# Patient Record
Sex: MALE | Race: WHITE | Employment: OTHER | URBAN - METROPOLITAN AREA
[De-identification: names, ages, dates, MRNs, and addresses within clinical notes are randomized per-mention and may not be internally consistent; named-entity substitution may affect disease eponyms.]

---

## 2017-08-02 ENCOUNTER — HOSPITAL ENCOUNTER (OUTPATIENT)
Dept: SURGERY | Age: 62
Discharge: HOME | End: 2017-08-02
Payer: COMMERCIAL

## 2017-08-02 VITALS
DIASTOLIC BLOOD PRESSURE: 86 MMHG | BODY MASS INDEX: 30.48 KG/M2 | RESPIRATION RATE: 16 BRPM | HEART RATE: 75 BPM | SYSTOLIC BLOOD PRESSURE: 143 MMHG | WEIGHT: 230 LBS | OXYGEN SATURATION: 95 % | HEIGHT: 73 IN | TEMPERATURE: 98.3 F

## 2017-08-02 LAB
ANION GAP BLD CALC-SCNC: 6 MMOL/L (ref 7–16)
APPEARANCE UR: CLEAR
BACTERIA SPEC CULT: NORMAL
BASOPHILS # BLD AUTO: 0 K/UL (ref 0–0.2)
BASOPHILS # BLD: 0 % (ref 0–2)
BILIRUB UR QL: NEGATIVE
BUN SERPL-MCNC: 20 MG/DL (ref 6–23)
CALCIUM SERPL-MCNC: 8.8 MG/DL (ref 8.3–10.4)
CHLORIDE SERPL-SCNC: 104 MMOL/L (ref 98–107)
CO2 SERPL-SCNC: 30 MMOL/L (ref 21–32)
COLOR UR: YELLOW
CREAT SERPL-MCNC: 0.81 MG/DL (ref 0.8–1.5)
DIFFERENTIAL METHOD BLD: ABNORMAL
EOSINOPHIL # BLD: 0 K/UL (ref 0–0.8)
EOSINOPHIL NFR BLD: 0 % (ref 0.5–7.8)
ERYTHROCYTE [DISTWIDTH] IN BLOOD BY AUTOMATED COUNT: 15.2 % (ref 11.9–14.6)
GLUCOSE SERPL-MCNC: 118 MG/DL (ref 65–100)
GLUCOSE UR STRIP.AUTO-MCNC: NEGATIVE MG/DL
HCT VFR BLD AUTO: 46.6 % (ref 41.1–50.3)
HGB BLD-MCNC: 15.9 G/DL (ref 13.6–17.2)
HGB UR QL STRIP: NEGATIVE
IMM GRANULOCYTES # BLD: 0.1 K/UL (ref 0–0.5)
IMM GRANULOCYTES NFR BLD AUTO: 0.6 % (ref 0–5)
INR PPP: 3 (ref 0.9–1.2)
KETONES UR QL STRIP.AUTO: NEGATIVE MG/DL
LEUKOCYTE ESTERASE UR QL STRIP.AUTO: NEGATIVE
LYMPHOCYTES # BLD AUTO: 9 % (ref 13–44)
LYMPHOCYTES # BLD: 0.7 K/UL (ref 0.5–4.6)
MCH RBC QN AUTO: 34.6 PG (ref 26.1–32.9)
MCHC RBC AUTO-ENTMCNC: 34.1 G/DL (ref 31.4–35)
MCV RBC AUTO: 101.3 FL (ref 79.6–97.8)
MONOCYTES # BLD: 0.3 K/UL (ref 0.1–1.3)
MONOCYTES NFR BLD AUTO: 4 % (ref 4–12)
NEUTS SEG # BLD: 7.1 K/UL (ref 1.7–8.2)
NEUTS SEG NFR BLD AUTO: 86 % (ref 43–78)
NITRITE UR QL STRIP.AUTO: NEGATIVE
PH UR STRIP: 6.5 [PH] (ref 5–9)
PLATELET # BLD AUTO: 147 K/UL (ref 150–450)
PMV BLD AUTO: 9.8 FL (ref 10.8–14.1)
POTASSIUM SERPL-SCNC: 4.9 MMOL/L (ref 3.5–5.1)
PROT UR STRIP-MCNC: NEGATIVE MG/DL
PROTHROMBIN TIME: 33.2 SEC (ref 9.6–12)
RBC # BLD AUTO: 4.6 M/UL (ref 4.23–5.67)
SERVICE CMNT-IMP: NORMAL
SODIUM SERPL-SCNC: 140 MMOL/L (ref 136–145)
SP GR UR REFRACTOMETRY: 1.02 (ref 1–1.02)
UROBILINOGEN UR QL STRIP.AUTO: 1 EU/DL (ref 0.2–1)
WBC # BLD AUTO: 8.1 K/UL (ref 4.3–11.1)

## 2017-08-02 PROCEDURE — 85025 COMPLETE CBC W/AUTO DIFF WBC: CPT | Performed by: ORTHOPAEDIC SURGERY

## 2017-08-02 PROCEDURE — 77030027138 HC INCENT SPIROMETER -A

## 2017-08-02 PROCEDURE — 87641 MR-STAPH DNA AMP PROBE: CPT | Performed by: ORTHOPAEDIC SURGERY

## 2017-08-02 PROCEDURE — 81003 URINALYSIS AUTO W/O SCOPE: CPT | Performed by: ORTHOPAEDIC SURGERY

## 2017-08-02 PROCEDURE — 80048 BASIC METABOLIC PNL TOTAL CA: CPT | Performed by: ORTHOPAEDIC SURGERY

## 2017-08-02 PROCEDURE — 85610 PROTHROMBIN TIME: CPT | Performed by: ORTHOPAEDIC SURGERY

## 2017-08-02 RX ORDER — ENOXAPARIN SODIUM 100 MG/ML
40 INJECTION SUBCUTANEOUS DAILY
COMMUNITY
End: 2017-08-30

## 2017-08-02 RX ORDER — PREDNISONE 20 MG/1
1 TABLET ORAL DAILY
COMMUNITY
Start: 2017-07-27 | End: 2017-08-30

## 2017-08-02 RX ORDER — WARFARIN 10 MG/1
1 TABLET ORAL DAILY
COMMUNITY
Start: 2016-03-24 | End: 2020-02-26 | Stop reason: CLARIF

## 2017-08-02 RX ORDER — HYDROCODONE BITARTRATE AND ACETAMINOPHEN 7.5; 325 MG/1; MG/1
1 TABLET ORAL
Refills: 0 | COMMUNITY
Start: 2017-07-12 | End: 2017-08-12

## 2017-08-02 NOTE — PERIOP NOTES
Patient verified name, , and surgery as listed in [facility]. TYPE  CASE:3              Orders:  received  Labs per Spine protocol:  CBC with dif, BMP, Pt/inr, and MRSA/MSSA nasal swab   Labs per anesthesia protocol: type and screen and pt/inr DOS- signed and held for preop  EKG/cardiac records  :  Not required  Glucose: Not required    Instructed patient to continue previous medications as prescribed prior to surgery and  to take the following medications the day of surgery according to anesthesia guidelines with a small sip of water : Bystolic, prednisone and Norco       Continue all previous medications unless otherwise directed. Instructed patient to hold  the following medications prior to surgery: Coumadin 5 days prior to surgery per Dr Tereza Gaffney and Lovenox Bridge per Dr Tereza Gaffney instructions    Pt viewed Spine Pre-hab video. All further questions were addressed. Pt was provided with antibacterial soap, Hibiclens, long-handled sponge, Spine Recovery booklet and incentive spirometer. Pt correctly demonstrated use of incentive spirometer and instruction to bring it to the hospital on day of surgery. Handouts and all Surgery instructions provided to pt and pt verbalizes understanding. Patient instructed on the following and verbalized understanding:  Arrive at MAIN entrance, time of arrival to be called the day before by 1700. Responsible adult must drive patient to and from hospital, stay during surgery and 24 hours postoperatively. Npo after midnight including gum, mints and ice chips. Shower using non-moisturizing soap and hibiclens the night before and the morning of surgery. Leave all valuables at home. Instructed on no jewelry or body piercings on the dos. Bring insurance card and ID. No perfumes, oil, powder, colognes, makeup or  lotions on the skin. Patient verbalized understanding of all instructions and provided all medical/health information to the best of their ability.

## 2017-08-02 NOTE — PERIOP NOTES
Recent Results (from the past 12 hour(s))   MSSA/MRSA SC BY PCR, NASAL SWAB    Collection Time: 08/02/17  9:34 AM   Result Value Ref Range    Special Requests: NO SPECIAL REQUESTS      Culture result:        SA target not detected. A MRSA NEGATIVE, SA NEGATIVE test result does not preclude MRSA or SA nasal colonization. CBC WITH AUTOMATED DIFF    Collection Time: 08/02/17  9:44 AM   Result Value Ref Range    WBC 8.1 4.3 - 11.1 K/uL    RBC 4.60 4.23 - 5.67 M/uL    HGB 15.9 13.6 - 17.2 g/dL    HCT 46.6 41.1 - 50.3 %    .3 (H) 79.6 - 97.8 FL    MCH 34.6 (H) 26.1 - 32.9 PG    MCHC 34.1 31.4 - 35.0 g/dL    RDW 15.2 (H) 11.9 - 14.6 %    PLATELET 743 (L) 993 - 450 K/uL    MPV 9.8 (L) 10.8 - 14.1 FL    DF AUTOMATED      NEUTROPHILS 86 (H) 43 - 78 %    LYMPHOCYTES 9 (L) 13 - 44 %    MONOCYTES 4 4.0 - 12.0 %    EOSINOPHILS 0 (L) 0.5 - 7.8 %    BASOPHILS 0 0.0 - 2.0 %    IMMATURE GRANULOCYTES 0.6 0.0 - 5.0 %    ABS. NEUTROPHILS 7.1 1.7 - 8.2 K/UL    ABS. LYMPHOCYTES 0.7 0.5 - 4.6 K/UL    ABS. MONOCYTES 0.3 0.1 - 1.3 K/UL    ABS. EOSINOPHILS 0.0 0.0 - 0.8 K/UL    ABS. BASOPHILS 0.0 0.0 - 0.2 K/UL    ABS. IMM.  GRANS. 0.1 0.0 - 0.5 K/UL   METABOLIC PANEL, BASIC    Collection Time: 08/02/17  9:44 AM   Result Value Ref Range    Sodium 140 136 - 145 mmol/L    Potassium 4.9 3.5 - 5.1 mmol/L    Chloride 104 98 - 107 mmol/L    CO2 30 21 - 32 mmol/L    Anion gap 6 (L) 7 - 16 mmol/L    Glucose 118 (H) 65 - 100 mg/dL    BUN 20 6 - 23 MG/DL    Creatinine 0.81 0.8 - 1.5 MG/DL    GFR est AA >60 >60 ml/min/1.73m2    GFR est non-AA >60 >60 ml/min/1.73m2    Calcium 8.8 8.3 - 10.4 MG/DL   PROTHROMBIN TIME + INR    Collection Time: 08/02/17  9:44 AM   Result Value Ref Range    Prothrombin time 33.2 (H) 9.6 - 12.0 sec    INR 3.0 (H) 0.9 - 1.2     URINALYSIS W/ RFLX MICROSCOPIC    Collection Time: 08/02/17 10:20 AM   Result Value Ref Range    Color YELLOW      Appearance CLEAR      Specific gravity 1.023 1.001 - 1.023      pH (UA) 6.5 5.0 - 9.0      Protein NEGATIVE  NEG mg/dL    Glucose NEGATIVE  mg/dL    Ketone NEGATIVE  NEG mg/dL    Bilirubin NEGATIVE  NEG      Blood NEGATIVE  NEG      Urobilinogen 1.0 0.2 - 1.0 EU/dL    Nitrites NEGATIVE  NEG      Leukocyte Esterase NEGATIVE  NEG

## 2017-08-07 ENCOUNTER — ANESTHESIA EVENT (OUTPATIENT)
Dept: SURGERY | Age: 62
DRG: 460 | End: 2017-08-07
Payer: COMMERCIAL

## 2017-08-07 NOTE — H&P
Chief complaint: Back and bilateral leg pain, Bilateral acute foot drop. History of present illness: This is a very pleasant 63year old patient who presents with a 2 week history of acute onset of bilateral leg pain and foot drop after a fall last week. He was on vacation and suffered to back-to-back falls. He then noticed that he had difficulty walking after these falls. He had tingling that started in his leg and now he cannot lift either foot because of acute foot drop. He was referred for further evaluation of his lumbar spine by Dr. Karo Shafer. MRI of the lumbar spine reveals severe spinal stenosis at L3-4 with lateral recess stenosis as well. At L4-5 there is a grade 1 anterolisthesis severe facet arthropathy and mild central and some lateral recess stenosis primarily on the left. There is degenerative changes and moderate left L5 lateral recess and foraminal stenosis at L5-S1. Patient is in a wheelchair today. He has difficulty walking any distance. He has pain in the back and radiates down each leg. He rates his pain a 10/10. He denies any change in bowel or bladder function. Diclofenac, Norco.  Patient takes daily Coumadin due to a history of 3 DVTs and a pulmonary embolism. He underwent a lobectomy due to aspirate was infection and his previous pulmonary embolism. He has come off of his Coumadin the past for surgery but has to be on a Lovenox bridge. His Coumadin as prescribed by Dr. Zacarias Meredith, his primary care physician. He is currently completing a prednisone pack. PMHx/PSHx/Social History/Medications/Allergies/ROS are listed and have been reviewed. Pertinent positives: History of DVTs and pulmonary emboli with daily anticoagulation therapy, history of a right lower lobectomy, hypertension, right total hip arthroplasty, right rotator cuff repair. He is a single nonsmoker who endorses minimal alcohol use and works full-time in building maintenance. Review of systems was noted. Pertinent positives and negatives were discussed with the patient particularly those that related to musculoskeletal complaints. Nonorthopedic complaints were directed to the primary care physician. Medications:  Diastolic, Coumadin  Norco (7.5-325 MG, Take 1 tablet po q 4-6 prn pain)    Allergies:  No known drug allergies  ?????    Physical Exam:   GENERAL: Well developed, well-nourished adult in no acute distress. Patient is appropriately conversant  EYES:  Pink conjunctivae, Sclera clear. No ptosis. ENT:  Nose and ears appear normal.  Mucous membranes moist   NECK:  Non-tender. No masses. Full range of motion. RESPIRATORY:  Normal respiratory effort. Lungs are clear to auscultation except for decreased breath sounds in the right lower lobe  CARDIOVASCULAR:  Pulse regular. No peripheral edema. The feet are warm with good capillary refill and palpable pedal pulses. No murmur. S1-S2 audible. GI:  Abdomen soft, non-tender, non-distended. Normal bowel sounds   SKIN:  No visible rashes, ulcers or lesions. Normal turgor and temperature   EXTREMITIES:  Warm and well perfused. No cyanosis or clubbing. MSK:   Examination of the lumbar spine reveals a relative hypolordosis, and no evidence of significant saggital plane deformity. There is exacerbation of symptoms with lumbar extension. There is not significant tenderness to palpation along the spinous processes or paraspinal musculature. The patient ambulates with a slightly crouched posture. There is minimal hip irritability with internal or external rotation bilaterally. NEURO:  Straight leg testing is negative. Sensory testing reveals intact sensation to light touch and pin prick in the distribution of the L3-S1 dermatomes bilaterally, though there is subjective tingling over the bilateral L4. Taty Brooks   Reflexes   Right Left   Quadriceps (L4) 1 1   Achilles (S1) 2 2     Ankle jerk is negative for clonus    Strength testing in the lower extremity reveals the following based on the 5 point grading scale:     HF (L2) H Ab (L5) KE (L3/4) ADF (L4) EHL (L5) A Ev (S1) APF (S1)   Right 5 5 5 0 0 5 5   Left 5 5 5 0 0 5 5       PSYCH:  Alert and oriented X 3. Appropriate affect. Intact judgment and insight. Data Reviewed:    Radiographic Studies:    X-rays including AP and lateral views of the lumbar spine were reviewed: ? ???? There is noted to be a Grade 1 degenerative spondylolisthesis at L4 and L5. Bone quality appears normal.    Radiographic impression: Lumbar spondylolisthesis    MRI Lumbar spine, report and images independently reviewed from [facility]:  L3-4 there is moderate facet arthropathy severe ligamentum flavum hypertrophy and degenerative disc disease causing severe spinal stenosis. There is also severe lateral recess stenosis. Right greater than left L3 foraminal stenosis. L4-5 there is a grade 1 anterolisthesis. There is disc bulging and severe facet arthropathy ligamentum flavum hypertrophy causing mild-to-moderate stenosis. There is mild right lateral recess narrowing. At L5-S1 there is severe degenerative disc disease with left-sided disc bulge and lateral left osteophytes. There is moderate to severe left foraminal L5 stenosis and mild right. There is severe facet arthropathy. There is also mention of a cyst on the left kidney. We will have his this reviewed by his primary care provider. Assessment/Plan: This patients clinical history and physical exam is consistent with neurogenic claudication which is likely due to lumbar stenosis. Patient has acute onset of bilateral foot drop secondary to his severe spinal stenosis after a fall. He also has a spondylolisthesis of L4 and L5 with multilevel degenerative disease and is spondylosis. There is some stenosis at L4-5 and lateral recess stenosis.     I discussed the natural history of lumbar stenosis in that it is a degenerative condition that is usually slowly progressive resulting in gradual loss of mobility. I reassured the patient that this is not a condition that typically predisposes him to an acute paraplegia; however, the more neurologic deficits he acquires and the longer they go untreated, the less likely he is to have neurological improvements after an operation. He understands that conservative treatments typically include physical therapy, oral and/or epidural steroids, pain management, and simple observation. And, that these treatments do not address the anatomical pinching of the spinal nerves, but rather help patients cope with the resulting symptoms. I also discussed potential surgical if the symptoms fail to improve or there is a progressive neurologic deficit or conservative management has been exhausted. ????    I reviewed patients case with Dr. Bri Ortiz. Because of patients acute foot drop secondary to severe spinal stenosis, surgery is indicated. We discussed the details of surgery including a midline incision in over the low back followed by dissection to the area of stenosis. The nerves would be freed up by trimming any impinging structures including ligaments and bone. Then any segments that are deemed to be unstable will be fused together with either bone graft or bone aspirate/synthetic, and typically screws and rods will supplement the fusion. A drain would be inserted and the wound would be closed with suture and covered with sterile dressings. The patient would expect to stay in the hospital 2-4 days or until he can get about safely with minimal assistance. A short stay in a rehabilitation facility could also be considered depending on how quickly he recovers. Follow-up would be scheduled for 2-3 weeks and he would have restrictions including no driving, and no lifting greater than 15 lbs until follow up with me.  He was encouraged to walk as much as possible before and after the operation to facilitate an expeditious recovery. We also discussed the potential risks of the surgery including, but not limited to infection, spinal fluid leak and potential headaches requiring him to remain supine or have a lumbar drain inserted post-operatively; injury to the cauda equina or peripheral nerve root resulting in paralysis, bowel or bladder dysfunction, or loss of use of an extremity; persistent back or leg symptoms or pain at the bone aspirate/graft site; recurrence of stenosis or the development of instability, or failure of the hardware or fusion to heal possibly needing additional surgery;  blood loss requiring transfusion; and the risks of anesthesia including, but not limited heart attack, stroke, and blood clot. The patient voiced an understanding of these issues and would like to discuss them over with her family and will get back with me with his desired treatment course. The procedure that may prove to be beneficial here is a L3-L5 laminectomy and fusion with bone marrow aspirate/synthetic and instrumentation, possible TLIF. The patient was prescribed an oral pain medication. The patient understands that this is a temporary measure to bring acute or post-surgical pain under control and that it out of the scope of this practice to continue opiates on a long-term basis. We will go ahead and contact patients primary care provider Devante Jiang to obtain permission for patient to stop his Coumadin 7 days prior to surgery and we will bridge him on Lovenox. We will obtain the correct Lovenox dosage from his primary care physician. ????? ?????         Electronically Signed By Alfredo LIM on 7/28/2017

## 2017-08-08 ENCOUNTER — APPOINTMENT (OUTPATIENT)
Dept: GENERAL RADIOLOGY | Age: 62
DRG: 460 | End: 2017-08-08
Attending: ORTHOPAEDIC SURGERY
Payer: COMMERCIAL

## 2017-08-08 ENCOUNTER — ANESTHESIA (OUTPATIENT)
Dept: SURGERY | Age: 62
DRG: 460 | End: 2017-08-08
Payer: COMMERCIAL

## 2017-08-08 ENCOUNTER — HOSPITAL ENCOUNTER (INPATIENT)
Age: 62
LOS: 4 days | Discharge: NURSING, REHAB OR LONG-TERM CARE | DRG: 460 | End: 2017-08-12
Attending: ORTHOPAEDIC SURGERY | Admitting: ORTHOPAEDIC SURGERY
Payer: COMMERCIAL

## 2017-08-08 DIAGNOSIS — M48.061 SPINAL STENOSIS OF LUMBAR REGION: ICD-10-CM

## 2017-08-08 PROBLEM — M48.00 SPINAL STENOSIS: Status: ACTIVE | Noted: 2017-08-08

## 2017-08-08 PROBLEM — M43.16 SPONDYLOLISTHESIS OF LUMBAR REGION: Status: ACTIVE | Noted: 2017-08-08

## 2017-08-08 LAB
ABO + RH BLD: NORMAL
BLOOD GROUP ANTIBODIES SERPL: NORMAL
INR BLD: NORMAL
INR PPP: 1 (ref 0.9–1.2)
PROTHROMBIN TIME: 10.6 SEC (ref 9.6–12)
SPECIMEN EXP DATE BLD: NORMAL

## 2017-08-08 PROCEDURE — 77030025623 HC BUR RND PRECIS STRY -D: Performed by: ORTHOPAEDIC SURGERY

## 2017-08-08 PROCEDURE — 76010000174 HC OR TIME 3.5 TO 4 HR INTENSV-TIER 1: Performed by: ORTHOPAEDIC SURGERY

## 2017-08-08 PROCEDURE — 85610 PROTHROMBIN TIME: CPT | Performed by: ORTHOPAEDIC SURGERY

## 2017-08-08 PROCEDURE — 0SG10AJ FUSION OF 2 OR MORE LUMBAR VERTEBRAL JOINTS WITH INTERBODY FUSION DEVICE, POSTERIOR APPROACH, ANTERIOR COLUMN, OPEN APPROACH: ICD-10-PCS | Performed by: ORTHOPAEDIC SURGERY

## 2017-08-08 PROCEDURE — 77030008703 HC TU ET UNCUF COVD -A: Performed by: ANESTHESIOLOGY

## 2017-08-08 PROCEDURE — 77030014647 HC SEAL FBRN TISSL BAXT -D: Performed by: ORTHOPAEDIC SURGERY

## 2017-08-08 PROCEDURE — 77030014368: Performed by: ORTHOPAEDIC SURGERY

## 2017-08-08 PROCEDURE — 77030014007 HC SPNG HEMSTAT J&J -B: Performed by: ORTHOPAEDIC SURGERY

## 2017-08-08 PROCEDURE — 74011250636 HC RX REV CODE- 250/636

## 2017-08-08 PROCEDURE — 77030037155 HC BLCKR SPN CAP LOK AXI STRY -B: Performed by: ORTHOPAEDIC SURGERY

## 2017-08-08 PROCEDURE — 77030011640 HC PAD GRND REM COVD -A: Performed by: ORTHOPAEDIC SURGERY

## 2017-08-08 PROCEDURE — 74011250637 HC RX REV CODE- 250/637: Performed by: ANESTHESIOLOGY

## 2017-08-08 PROCEDURE — 74011250636 HC RX REV CODE- 250/636: Performed by: ANESTHESIOLOGY

## 2017-08-08 PROCEDURE — 77030037710: Performed by: ORTHOPAEDIC SURGERY

## 2017-08-08 PROCEDURE — 77030019908 HC STETH ESOPH SIMS -A: Performed by: ANESTHESIOLOGY

## 2017-08-08 PROCEDURE — 77030032490 HC SLV COMPR SCD KNE COVD -B: Performed by: ORTHOPAEDIC SURGERY

## 2017-08-08 PROCEDURE — 0SG1071 FUSION OF 2 OR MORE LUMBAR VERTEBRAL JOINTS WITH AUTOLOGOUS TISSUE SUBSTITUTE, POSTERIOR APPROACH, POSTERIOR COLUMN, OPEN APPROACH: ICD-10-PCS | Performed by: ORTHOPAEDIC SURGERY

## 2017-08-08 PROCEDURE — 77030008477 HC STYL SATN SLP COVD -A: Performed by: ANESTHESIOLOGY

## 2017-08-08 PROCEDURE — 77030030163 HC BN WAX J&J -A: Performed by: ORTHOPAEDIC SURGERY

## 2017-08-08 PROCEDURE — 74011250636 HC RX REV CODE- 250/636: Performed by: ORTHOPAEDIC SURGERY

## 2017-08-08 PROCEDURE — 77030018836 HC SOL IRR NACL ICUM -A: Performed by: ORTHOPAEDIC SURGERY

## 2017-08-08 PROCEDURE — 74011000250 HC RX REV CODE- 250

## 2017-08-08 PROCEDURE — 77030031139 HC SUT VCRL2 J&J -A: Performed by: ORTHOPAEDIC SURGERY

## 2017-08-08 PROCEDURE — 77030000001 HC SPCR SPN PEEK STRY -I2: Performed by: ORTHOPAEDIC SURGERY

## 2017-08-08 PROCEDURE — 77030034760 HC NDL BN MAR ASPIR JAMSH STRY -B: Performed by: ORTHOPAEDIC SURGERY

## 2017-08-08 PROCEDURE — 77030020782 HC GWN BAIR PAWS FLX 3M -B: Performed by: ANESTHESIOLOGY

## 2017-08-08 PROCEDURE — 76210000003 HC OR PH I REC 3.5 TO 4 HR: Performed by: ORTHOPAEDIC SURGERY

## 2017-08-08 PROCEDURE — 72100 X-RAY EXAM L-S SPINE 2/3 VWS: CPT

## 2017-08-08 PROCEDURE — 07DR3ZZ EXTRACTION OF ILIAC BONE MARROW, PERCUTANEOUS APPROACH: ICD-10-PCS | Performed by: ORTHOPAEDIC SURGERY

## 2017-08-08 PROCEDURE — C1713 ANCHOR/SCREW BN/BN,TIS/BN: HCPCS | Performed by: ORTHOPAEDIC SURGERY

## 2017-08-08 PROCEDURE — 76060000039 HC ANESTHESIA 4 TO 4.5 HR: Performed by: ORTHOPAEDIC SURGERY

## 2017-08-08 PROCEDURE — 77030037158 HC BIT DRL SPN XIA DISP STRY -C: Performed by: ORTHOPAEDIC SURGERY

## 2017-08-08 PROCEDURE — 36415 COLL VENOUS BLD VENIPUNCTURE: CPT | Performed by: ORTHOPAEDIC SURGERY

## 2017-08-08 PROCEDURE — 65270000029 HC RM PRIVATE

## 2017-08-08 PROCEDURE — 77030037143 HC ROD SPN HEX XIA TI STRY -D: Performed by: ORTHOPAEDIC SURGERY

## 2017-08-08 PROCEDURE — 74011250637 HC RX REV CODE- 250/637: Performed by: ORTHOPAEDIC SURGERY

## 2017-08-08 PROCEDURE — 99218 HC RM OBSERVATION: CPT

## 2017-08-08 PROCEDURE — 77030037145 HC XCONN SPN POLYX XIA STRY -G: Performed by: ORTHOPAEDIC SURGERY

## 2017-08-08 PROCEDURE — 0SB20ZZ EXCISION OF LUMBAR VERTEBRAL DISC, OPEN APPROACH: ICD-10-PCS | Performed by: ORTHOPAEDIC SURGERY

## 2017-08-08 PROCEDURE — 74011000272 HC RX REV CODE- 272: Performed by: ORTHOPAEDIC SURGERY

## 2017-08-08 PROCEDURE — 77030012894: Performed by: ORTHOPAEDIC SURGERY

## 2017-08-08 PROCEDURE — 86900 BLOOD TYPING SEROLOGIC ABO: CPT | Performed by: ANESTHESIOLOGY

## 2017-08-08 DEVICE — POLYAXIAL CORTICAL SCREW
Type: IMPLANTABLE DEVICE | Site: SPINE LUMBAR | Status: FUNCTIONAL
Brand: XIA 4.5 SYSTEM -  XIA CT

## 2017-08-08 DEVICE — BIO DBM PLUS PUTTY (WITH CANCELLOUS)
Type: IMPLANTABLE DEVICE | Site: SPINE LUMBAR | Status: FUNCTIONAL
Brand: BIO DBM

## 2017-08-08 DEVICE — BLOCKER
Type: IMPLANTABLE DEVICE | Site: SPINE LUMBAR | Status: FUNCTIONAL
Brand: XIA 4.5 SYSTEM -  XIA CT

## 2017-08-08 DEVICE — 30-36 MM POLYAXIAL CROSS CONNECTOR
Type: IMPLANTABLE DEVICE | Site: SPINE LUMBAR | Status: FUNCTIONAL
Brand: XIA 4 5

## 2017-08-08 DEVICE — GRAFT BNE SUB 20CC 2 4MM GRAN GROWTH FACT ALLGRFT OSTEOAMP: Type: IMPLANTABLE DEVICE | Site: SPINE LUMBAR | Status: FUNCTIONAL

## 2017-08-08 DEVICE — VITALLIUM PREBENT AND PRECUT ROD WITH HEX
Type: IMPLANTABLE DEVICE | Site: SPINE LUMBAR | Status: FUNCTIONAL
Brand: XIA 4 5

## 2017-08-08 DEVICE — VERTEBRAL SPACER
Type: IMPLANTABLE DEVICE | Site: SPINE LUMBAR | Status: FUNCTIONAL
Brand: AVS TL

## 2017-08-08 RX ORDER — HYDROMORPHONE HYDROCHLORIDE 1 MG/ML
1 INJECTION, SOLUTION INTRAMUSCULAR; INTRAVENOUS; SUBCUTANEOUS
Status: DISCONTINUED | OUTPATIENT
Start: 2017-08-08 | End: 2017-08-12 | Stop reason: HOSPADM

## 2017-08-08 RX ORDER — PREDNISONE 20 MG/1
20 TABLET ORAL
Status: DISCONTINUED | OUTPATIENT
Start: 2017-08-09 | End: 2017-08-12 | Stop reason: HOSPADM

## 2017-08-08 RX ORDER — SODIUM CHLORIDE, SODIUM LACTATE, POTASSIUM CHLORIDE, CALCIUM CHLORIDE 600; 310; 30; 20 MG/100ML; MG/100ML; MG/100ML; MG/100ML
75 INJECTION, SOLUTION INTRAVENOUS CONTINUOUS
Status: DISCONTINUED | OUTPATIENT
Start: 2017-08-08 | End: 2017-08-08 | Stop reason: HOSPADM

## 2017-08-08 RX ORDER — ONDANSETRON 2 MG/ML
4 INJECTION INTRAMUSCULAR; INTRAVENOUS
Status: DISCONTINUED | OUTPATIENT
Start: 2017-08-08 | End: 2017-08-12 | Stop reason: HOSPADM

## 2017-08-08 RX ORDER — DEXTROSE, SODIUM CHLORIDE, AND POTASSIUM CHLORIDE 5; .45; .15 G/100ML; G/100ML; G/100ML
100 INJECTION INTRAVENOUS CONTINUOUS
Status: DISCONTINUED | OUTPATIENT
Start: 2017-08-08 | End: 2017-08-10 | Stop reason: ALTCHOICE

## 2017-08-08 RX ORDER — SODIUM CHLORIDE 0.9 % (FLUSH) 0.9 %
5-10 SYRINGE (ML) INJECTION EVERY 8 HOURS
Status: DISCONTINUED | OUTPATIENT
Start: 2017-08-08 | End: 2017-08-12 | Stop reason: HOSPADM

## 2017-08-08 RX ORDER — ROCURONIUM BROMIDE 10 MG/ML
INJECTION, SOLUTION INTRAVENOUS AS NEEDED
Status: DISCONTINUED | OUTPATIENT
Start: 2017-08-08 | End: 2017-08-08 | Stop reason: HOSPADM

## 2017-08-08 RX ORDER — ENOXAPARIN SODIUM 100 MG/ML
40 INJECTION SUBCUTANEOUS EVERY 24 HOURS
Status: DISCONTINUED | OUTPATIENT
Start: 2017-08-09 | End: 2017-08-12 | Stop reason: HOSPADM

## 2017-08-08 RX ORDER — CEFAZOLIN SODIUM IN 0.9 % NACL 2 G/50 ML
2 INTRAVENOUS SOLUTION, PIGGYBACK (ML) INTRAVENOUS EVERY 8 HOURS
Status: COMPLETED | OUTPATIENT
Start: 2017-08-08 | End: 2017-08-09

## 2017-08-08 RX ORDER — ACETAMINOPHEN 10 MG/ML
1000 INJECTION, SOLUTION INTRAVENOUS ONCE
Status: COMPLETED | OUTPATIENT
Start: 2017-08-08 | End: 2017-08-08

## 2017-08-08 RX ORDER — WARFARIN SODIUM 5 MG/1
10 TABLET ORAL EVERY EVENING
Status: DISCONTINUED | OUTPATIENT
Start: 2017-08-09 | End: 2017-08-09

## 2017-08-08 RX ORDER — TESTOSTERONE CYPIONATE 200 MG/ML
200 INJECTION INTRAMUSCULAR
Status: DISCONTINUED | OUTPATIENT
Start: 2017-08-11 | End: 2017-08-12 | Stop reason: HOSPADM

## 2017-08-08 RX ORDER — LIDOCAINE HYDROCHLORIDE 20 MG/ML
INJECTION, SOLUTION EPIDURAL; INFILTRATION; INTRACAUDAL; PERINEURAL AS NEEDED
Status: DISCONTINUED | OUTPATIENT
Start: 2017-08-08 | End: 2017-08-08 | Stop reason: HOSPADM

## 2017-08-08 RX ORDER — PROPOFOL 10 MG/ML
INJECTION, EMULSION INTRAVENOUS AS NEEDED
Status: DISCONTINUED | OUTPATIENT
Start: 2017-08-08 | End: 2017-08-08 | Stop reason: HOSPADM

## 2017-08-08 RX ORDER — ONDANSETRON 2 MG/ML
INJECTION INTRAMUSCULAR; INTRAVENOUS AS NEEDED
Status: DISCONTINUED | OUTPATIENT
Start: 2017-08-08 | End: 2017-08-08 | Stop reason: HOSPADM

## 2017-08-08 RX ORDER — DIPHENHYDRAMINE HCL 25 MG
25 CAPSULE ORAL
Status: DISCONTINUED | OUTPATIENT
Start: 2017-08-08 | End: 2017-08-12 | Stop reason: HOSPADM

## 2017-08-08 RX ORDER — HYDROMORPHONE HYDROCHLORIDE 2 MG/ML
0.5 INJECTION, SOLUTION INTRAMUSCULAR; INTRAVENOUS; SUBCUTANEOUS
Status: DISCONTINUED | OUTPATIENT
Start: 2017-08-08 | End: 2017-08-08 | Stop reason: HOSPADM

## 2017-08-08 RX ORDER — FENTANYL CITRATE 50 UG/ML
INJECTION, SOLUTION INTRAMUSCULAR; INTRAVENOUS AS NEEDED
Status: DISCONTINUED | OUTPATIENT
Start: 2017-08-08 | End: 2017-08-08 | Stop reason: HOSPADM

## 2017-08-08 RX ORDER — SODIUM CHLORIDE 0.9 % (FLUSH) 0.9 %
5-10 SYRINGE (ML) INJECTION AS NEEDED
Status: DISCONTINUED | OUTPATIENT
Start: 2017-08-08 | End: 2017-08-08 | Stop reason: HOSPADM

## 2017-08-08 RX ORDER — CYCLOBENZAPRINE HCL 10 MG
10 TABLET ORAL
Status: DISCONTINUED | OUTPATIENT
Start: 2017-08-08 | End: 2017-08-12 | Stop reason: HOSPADM

## 2017-08-08 RX ORDER — NEOSTIGMINE METHYLSULFATE 1 MG/ML
INJECTION INTRAVENOUS AS NEEDED
Status: DISCONTINUED | OUTPATIENT
Start: 2017-08-08 | End: 2017-08-08 | Stop reason: HOSPADM

## 2017-08-08 RX ORDER — GLYCOPYRROLATE 0.2 MG/ML
INJECTION INTRAMUSCULAR; INTRAVENOUS AS NEEDED
Status: DISCONTINUED | OUTPATIENT
Start: 2017-08-08 | End: 2017-08-08 | Stop reason: HOSPADM

## 2017-08-08 RX ORDER — LIDOCAINE HYDROCHLORIDE 10 MG/ML
0.1 INJECTION INFILTRATION; PERINEURAL AS NEEDED
Status: DISCONTINUED | OUTPATIENT
Start: 2017-08-08 | End: 2017-08-08 | Stop reason: HOSPADM

## 2017-08-08 RX ORDER — FAMOTIDINE 20 MG/1
20 TABLET, FILM COATED ORAL ONCE
Status: COMPLETED | OUTPATIENT
Start: 2017-08-08 | End: 2017-08-08

## 2017-08-08 RX ORDER — SODIUM CHLORIDE, SODIUM LACTATE, POTASSIUM CHLORIDE, CALCIUM CHLORIDE 600; 310; 30; 20 MG/100ML; MG/100ML; MG/100ML; MG/100ML
INJECTION, SOLUTION INTRAVENOUS
Status: DISCONTINUED | OUTPATIENT
Start: 2017-08-08 | End: 2017-08-08 | Stop reason: HOSPADM

## 2017-08-08 RX ORDER — OXYCODONE HYDROCHLORIDE 5 MG/1
10 TABLET ORAL
Status: COMPLETED | OUTPATIENT
Start: 2017-08-08 | End: 2017-08-08

## 2017-08-08 RX ORDER — CEFAZOLIN SODIUM IN 0.9 % NACL 2 G/50 ML
2 INTRAVENOUS SOLUTION, PIGGYBACK (ML) INTRAVENOUS ONCE
Status: COMPLETED | OUTPATIENT
Start: 2017-08-08 | End: 2017-08-08

## 2017-08-08 RX ORDER — NALOXONE HYDROCHLORIDE 0.4 MG/ML
0.2 INJECTION, SOLUTION INTRAMUSCULAR; INTRAVENOUS; SUBCUTANEOUS AS NEEDED
Status: DISCONTINUED | OUTPATIENT
Start: 2017-08-08 | End: 2017-08-08 | Stop reason: HOSPADM

## 2017-08-08 RX ORDER — TRAZODONE HYDROCHLORIDE 50 MG/1
25-50 TABLET ORAL
Status: DISCONTINUED | OUTPATIENT
Start: 2017-08-08 | End: 2017-08-12 | Stop reason: HOSPADM

## 2017-08-08 RX ORDER — SODIUM CHLORIDE 0.9 % (FLUSH) 0.9 %
5-10 SYRINGE (ML) INJECTION AS NEEDED
Status: DISCONTINUED | OUTPATIENT
Start: 2017-08-08 | End: 2017-08-12 | Stop reason: HOSPADM

## 2017-08-08 RX ORDER — DEXAMETHASONE SODIUM PHOSPHATE 4 MG/ML
INJECTION, SOLUTION INTRA-ARTICULAR; INTRALESIONAL; INTRAMUSCULAR; INTRAVENOUS; SOFT TISSUE AS NEEDED
Status: DISCONTINUED | OUTPATIENT
Start: 2017-08-08 | End: 2017-08-08 | Stop reason: HOSPADM

## 2017-08-08 RX ORDER — HYDROMORPHONE HYDROCHLORIDE 1 MG/ML
0.5 INJECTION, SOLUTION INTRAMUSCULAR; INTRAVENOUS; SUBCUTANEOUS
Status: DISCONTINUED | OUTPATIENT
Start: 2017-08-08 | End: 2017-08-08 | Stop reason: HOSPADM

## 2017-08-08 RX ORDER — HYDROCODONE BITARTRATE AND ACETAMINOPHEN 10; 325 MG/1; MG/1
1 TABLET ORAL
Status: DISCONTINUED | OUTPATIENT
Start: 2017-08-08 | End: 2017-08-12 | Stop reason: HOSPADM

## 2017-08-08 RX ORDER — HYDROMORPHONE HYDROCHLORIDE 2 MG/ML
INJECTION, SOLUTION INTRAMUSCULAR; INTRAVENOUS; SUBCUTANEOUS AS NEEDED
Status: DISCONTINUED | OUTPATIENT
Start: 2017-08-08 | End: 2017-08-08 | Stop reason: HOSPADM

## 2017-08-08 RX ORDER — NEBIVOLOL 5 MG/1
5 TABLET ORAL DAILY
Status: DISCONTINUED | OUTPATIENT
Start: 2017-08-09 | End: 2017-08-12 | Stop reason: HOSPADM

## 2017-08-08 RX ORDER — VANCOMYCIN HYDROCHLORIDE 1 G/20ML
INJECTION, POWDER, LYOPHILIZED, FOR SOLUTION INTRAVENOUS AS NEEDED
Status: DISCONTINUED | OUTPATIENT
Start: 2017-08-08 | End: 2017-08-08 | Stop reason: HOSPADM

## 2017-08-08 RX ORDER — HYDROCORTISONE SODIUM SUCCINATE 100 MG/2ML
50 INJECTION, POWDER, FOR SOLUTION INTRAMUSCULAR; INTRAVENOUS ONCE
Status: COMPLETED | OUTPATIENT
Start: 2017-08-08 | End: 2017-08-08

## 2017-08-08 RX ORDER — SUCCINYLCHOLINE CHLORIDE 20 MG/ML
INJECTION INTRAMUSCULAR; INTRAVENOUS AS NEEDED
Status: DISCONTINUED | OUTPATIENT
Start: 2017-08-08 | End: 2017-08-08 | Stop reason: HOSPADM

## 2017-08-08 RX ORDER — MIDAZOLAM HYDROCHLORIDE 1 MG/ML
2 INJECTION, SOLUTION INTRAMUSCULAR; INTRAVENOUS
Status: DISCONTINUED | OUTPATIENT
Start: 2017-08-08 | End: 2017-08-08 | Stop reason: HOSPADM

## 2017-08-08 RX ORDER — FAMOTIDINE 20 MG/1
20 TABLET, FILM COATED ORAL EVERY 12 HOURS
Status: DISCONTINUED | OUTPATIENT
Start: 2017-08-08 | End: 2017-08-12 | Stop reason: HOSPADM

## 2017-08-08 RX ORDER — OXYCODONE AND ACETAMINOPHEN 5; 325 MG/1; MG/1
1 TABLET ORAL AS NEEDED
Status: DISCONTINUED | OUTPATIENT
Start: 2017-08-08 | End: 2017-08-08 | Stop reason: HOSPADM

## 2017-08-08 RX ORDER — ADHESIVE BANDAGE
30 BANDAGE TOPICAL DAILY
Status: DISCONTINUED | OUTPATIENT
Start: 2017-08-09 | End: 2017-08-09

## 2017-08-08 RX ADMIN — HYDROMORPHONE HYDROCHLORIDE 0.2 MG: 2 INJECTION, SOLUTION INTRAMUSCULAR; INTRAVENOUS; SUBCUTANEOUS at 18:25

## 2017-08-08 RX ADMIN — HYDROMORPHONE HYDROCHLORIDE 1 MG: 1 INJECTION, SOLUTION INTRAMUSCULAR; INTRAVENOUS; SUBCUTANEOUS at 23:47

## 2017-08-08 RX ADMIN — TRAZODONE HYDROCHLORIDE 50 MG: 50 TABLET ORAL at 22:40

## 2017-08-08 RX ADMIN — LIDOCAINE HYDROCHLORIDE 60 MG: 20 INJECTION, SOLUTION EPIDURAL; INFILTRATION; INTRACAUDAL; PERINEURAL at 14:49

## 2017-08-08 RX ADMIN — DIPHENHYDRAMINE HYDROCHLORIDE 25 MG: 25 CAPSULE ORAL at 22:40

## 2017-08-08 RX ADMIN — ROCURONIUM BROMIDE 10 MG: 10 INJECTION, SOLUTION INTRAVENOUS at 17:38

## 2017-08-08 RX ADMIN — HYDROMORPHONE HYDROCHLORIDE 0.2 MG: 2 INJECTION, SOLUTION INTRAMUSCULAR; INTRAVENOUS; SUBCUTANEOUS at 18:10

## 2017-08-08 RX ADMIN — FAMOTIDINE 20 MG: 20 TABLET ORAL at 22:40

## 2017-08-08 RX ADMIN — Medication 10 ML: at 22:45

## 2017-08-08 RX ADMIN — HYDROMORPHONE HYDROCHLORIDE 0.5 MG: 2 INJECTION, SOLUTION INTRAMUSCULAR; INTRAVENOUS; SUBCUTANEOUS at 19:47

## 2017-08-08 RX ADMIN — HYDROMORPHONE HYDROCHLORIDE 0.4 MG: 2 INJECTION, SOLUTION INTRAMUSCULAR; INTRAVENOUS; SUBCUTANEOUS at 18:00

## 2017-08-08 RX ADMIN — SUCCINYLCHOLINE CHLORIDE 180 MG: 20 INJECTION INTRAMUSCULAR; INTRAVENOUS at 14:49

## 2017-08-08 RX ADMIN — ONDANSETRON 4 MG: 2 INJECTION INTRAMUSCULAR; INTRAVENOUS at 22:46

## 2017-08-08 RX ADMIN — MIDAZOLAM HYDROCHLORIDE 2 MG: 1 INJECTION, SOLUTION INTRAMUSCULAR; INTRAVENOUS at 14:36

## 2017-08-08 RX ADMIN — HYDROMORPHONE HYDROCHLORIDE 0.5 MG: 2 INJECTION, SOLUTION INTRAMUSCULAR; INTRAVENOUS; SUBCUTANEOUS at 19:03

## 2017-08-08 RX ADMIN — FENTANYL CITRATE 100 MCG: 50 INJECTION, SOLUTION INTRAMUSCULAR; INTRAVENOUS at 14:46

## 2017-08-08 RX ADMIN — SODIUM CHLORIDE, SODIUM LACTATE, POTASSIUM CHLORIDE, CALCIUM CHLORIDE: 600; 310; 30; 20 INJECTION, SOLUTION INTRAVENOUS at 15:05

## 2017-08-08 RX ADMIN — FAMOTIDINE 20 MG: 20 TABLET ORAL at 13:23

## 2017-08-08 RX ADMIN — HYDROMORPHONE HYDROCHLORIDE 0.5 MG: 2 INJECTION, SOLUTION INTRAMUSCULAR; INTRAVENOUS; SUBCUTANEOUS at 18:53

## 2017-08-08 RX ADMIN — SODIUM CHLORIDE, SODIUM LACTATE, POTASSIUM CHLORIDE, AND CALCIUM CHLORIDE 75 ML/HR: 600; 310; 30; 20 INJECTION, SOLUTION INTRAVENOUS at 13:23

## 2017-08-08 RX ADMIN — HYDROMORPHONE HYDROCHLORIDE 0.2 MG: 2 INJECTION, SOLUTION INTRAMUSCULAR; INTRAVENOUS; SUBCUTANEOUS at 18:19

## 2017-08-08 RX ADMIN — DEXAMETHASONE SODIUM PHOSPHATE 4 MG: 4 INJECTION, SOLUTION INTRA-ARTICULAR; INTRALESIONAL; INTRAMUSCULAR; INTRAVENOUS; SOFT TISSUE at 18:10

## 2017-08-08 RX ADMIN — FENTANYL CITRATE 50 MCG: 50 INJECTION, SOLUTION INTRAMUSCULAR; INTRAVENOUS at 15:46

## 2017-08-08 RX ADMIN — ROCURONIUM BROMIDE 10 MG: 10 INJECTION, SOLUTION INTRAVENOUS at 16:22

## 2017-08-08 RX ADMIN — CEFAZOLIN 2 G: 1 INJECTION, POWDER, FOR SOLUTION INTRAMUSCULAR; INTRAVENOUS; PARENTERAL at 23:04

## 2017-08-08 RX ADMIN — HYDROMORPHONE HYDROCHLORIDE 0.4 MG: 2 INJECTION, SOLUTION INTRAMUSCULAR; INTRAVENOUS; SUBCUTANEOUS at 17:56

## 2017-08-08 RX ADMIN — ROCURONIUM BROMIDE 15 MG: 10 INJECTION, SOLUTION INTRAVENOUS at 15:46

## 2017-08-08 RX ADMIN — ROCURONIUM BROMIDE 50 MG: 10 INJECTION, SOLUTION INTRAVENOUS at 16:55

## 2017-08-08 RX ADMIN — ONDANSETRON 4 MG: 2 INJECTION INTRAMUSCULAR; INTRAVENOUS at 18:10

## 2017-08-08 RX ADMIN — SODIUM CHLORIDE, SODIUM LACTATE, POTASSIUM CHLORIDE, CALCIUM CHLORIDE: 600; 310; 30; 20 INJECTION, SOLUTION INTRAVENOUS at 17:37

## 2017-08-08 RX ADMIN — NEOSTIGMINE METHYLSULFATE 5 MG: 1 INJECTION INTRAVENOUS at 18:30

## 2017-08-08 RX ADMIN — ROCURONIUM BROMIDE 10 MG: 10 INJECTION, SOLUTION INTRAVENOUS at 16:09

## 2017-08-08 RX ADMIN — ROCURONIUM BROMIDE 10 MG: 10 INJECTION, SOLUTION INTRAVENOUS at 16:35

## 2017-08-08 RX ADMIN — FENTANYL CITRATE 50 MCG: 50 INJECTION, SOLUTION INTRAMUSCULAR; INTRAVENOUS at 16:35

## 2017-08-08 RX ADMIN — HYDROMORPHONE HYDROCHLORIDE 0.5 MG: 2 INJECTION, SOLUTION INTRAMUSCULAR; INTRAVENOUS; SUBCUTANEOUS at 19:15

## 2017-08-08 RX ADMIN — ROCURONIUM BROMIDE 15 MG: 10 INJECTION, SOLUTION INTRAVENOUS at 17:07

## 2017-08-08 RX ADMIN — CYCLOBENZAPRINE HYDROCHLORIDE 10 MG: 10 TABLET, FILM COATED ORAL at 22:38

## 2017-08-08 RX ADMIN — HYDROCODONE BITARTRATE AND ACETAMINOPHEN 1 TABLET: 10; 325 TABLET ORAL at 22:38

## 2017-08-08 RX ADMIN — HYDROMORPHONE HYDROCHLORIDE 0.5 MG: 2 INJECTION, SOLUTION INTRAMUSCULAR; INTRAVENOUS; SUBCUTANEOUS at 19:57

## 2017-08-08 RX ADMIN — PROPOFOL 200 MG: 10 INJECTION, EMULSION INTRAVENOUS at 14:49

## 2017-08-08 RX ADMIN — ACETAMINOPHEN 1000 MG: 10 INJECTION, SOLUTION INTRAVENOUS at 19:43

## 2017-08-08 RX ADMIN — OXYCODONE HYDROCHLORIDE 10 MG: 5 TABLET ORAL at 20:47

## 2017-08-08 RX ADMIN — HYDROMORPHONE HYDROCHLORIDE 0.5 MG: 2 INJECTION, SOLUTION INTRAMUSCULAR; INTRAVENOUS; SUBCUTANEOUS at 18:58

## 2017-08-08 RX ADMIN — ROCURONIUM BROMIDE 10 MG: 10 INJECTION, SOLUTION INTRAVENOUS at 15:26

## 2017-08-08 RX ADMIN — SODIUM CHLORIDE, SODIUM LACTATE, POTASSIUM CHLORIDE, AND CALCIUM CHLORIDE: 600; 310; 30; 20 INJECTION, SOLUTION INTRAVENOUS at 17:34

## 2017-08-08 RX ADMIN — FENTANYL CITRATE 50 MCG: 50 INJECTION, SOLUTION INTRAMUSCULAR; INTRAVENOUS at 17:07

## 2017-08-08 RX ADMIN — CEFAZOLIN 2 G: 1 INJECTION, POWDER, FOR SOLUTION INTRAMUSCULAR; INTRAVENOUS; PARENTERAL at 15:01

## 2017-08-08 RX ADMIN — ROCURONIUM BROMIDE 30 MG: 10 INJECTION, SOLUTION INTRAVENOUS at 15:16

## 2017-08-08 RX ADMIN — GLYCOPYRROLATE 0.8 MG: 0.2 INJECTION INTRAMUSCULAR; INTRAVENOUS at 18:30

## 2017-08-08 RX ADMIN — HYDROMORPHONE HYDROCHLORIDE 0.2 MG: 2 INJECTION, SOLUTION INTRAMUSCULAR; INTRAVENOUS; SUBCUTANEOUS at 18:21

## 2017-08-08 RX ADMIN — HYDROCORTISONE SODIUM SUCCINATE 50 MG: 100 INJECTION, POWDER, FOR SOLUTION INTRAMUSCULAR; INTRAVENOUS at 13:24

## 2017-08-08 RX ADMIN — HYDROMORPHONE HYDROCHLORIDE 0.4 MG: 2 INJECTION, SOLUTION INTRAMUSCULAR; INTRAVENOUS; SUBCUTANEOUS at 18:28

## 2017-08-08 RX ADMIN — DEXTROSE MONOHYDRATE, SODIUM CHLORIDE, AND POTASSIUM CHLORIDE 100 ML/HR: 50; 4.5; 1.49 INJECTION, SOLUTION INTRAVENOUS at 23:04

## 2017-08-08 RX ADMIN — ROCURONIUM BROMIDE 10 MG: 10 INJECTION, SOLUTION INTRAVENOUS at 14:49

## 2017-08-08 RX ADMIN — FENTANYL CITRATE 50 MCG: 50 INJECTION, SOLUTION INTRAMUSCULAR; INTRAVENOUS at 16:48

## 2017-08-08 NOTE — ANESTHESIA PREPROCEDURE EVALUATION
Anesthetic History   No history of anesthetic complications            Review of Systems / Medical History  Patient summary reviewed and nursing notes reviewed    Pulmonary                Comments: H/o RLL    Neuro/Psych   Within defined limits           Cardiovascular    Hypertension: well controlled              Exercise tolerance: >4 METS     GI/Hepatic/Renal  Within defined limits             Comments: S/p Nissen Endo/Other        Obesity and arthritis     Other Findings   Comments: Patient has h/o DVT/PE. Physical Exam    Airway  Mallampati: III  TM Distance: > 6 cm  Neck ROM: normal range of motion   Mouth opening: Normal     Cardiovascular    Rhythm: regular  Rate: normal         Dental      Comments: Chipped front teeth   Pulmonary  Breath sounds clear to auscultation               Abdominal  GI exam deferred       Other Findings            Anesthetic Plan    ASA: 2  Anesthesia type: general          Induction: Intravenous  Anesthetic plan and risks discussed with: Patient      Positioning and POVL discussed. Glidescope in room.

## 2017-08-08 NOTE — OP NOTES
[facility]   OPERATIVE REPORT       Name:  Britany York   MR#:  [id number]   :  1955   Account #:  [de-identified]   Date of Adm:  2017       DATE OF SURGERY: 2017    PREOPERATIVE DIAGNOSES: L3-4 and L4-5 spinal stenosis with   spondylolisthesis and acute bilateral foot drop. POSTOPERATIVE DIAGNOSES: L3-4 and L4-5 spinal stenosis with   spondylolisthesis and acute bilateral foot drop. PROCEDURES:   1. Bilateral L3, L4 and L5 laminectomy, partial facetectomy and   foraminotomy. 2. L3-4 and L4-5 posterior lumbar interbody fusion. 3. Placement of biomechanical interbody device at L3-4 and L4-5   using the VasSol biomechanical implant. 4. L3 to L5 posterolateral fusion. 5. Placement of segmental spinal instrumentation L3 to L5 using   the Owen cortical screw and erica system. 6. Left iliac crest bone marrow aspiration. 7. Use of local bone graft, OsteoAMP and demineralized bone   matrix for fusion. SURGEON: Vishal Laird. Radha Olvera MD    ASSISTANT: Marcelino Simms MD, orthopedic surgeon. ANESTHESIA: GETA. ESTIMATED BLOOD LOSS: 750 mL. FLUIDS: 2500 mL of crystalloid. DRAINS: One Hemovac. SPECIMENS: None. INDICATIONS: The patient is a 59-year-old white male with   preexisting asymptomatic lumbar spinal stenosis and   spondylolisthesis who was at the beach last [place] and fell,   hyperextended his spine and has developed acute numbness and   then bilateral foot drop. The patient did not improve, he was   very limited on his ability to mobilize and it was felt that his   best option to try to regain function in his ankle dorsiflexion   and EHL was to proceed on with surgery as opposed to have a lot   extended conservative course. Therefore, the patient is brought   for surgical treatment. PROCEDURE: The patient was brought to the operating room.  After   administration of anesthesia, IV antibiotics and placement of   monitoring lines, he was placed prone on the Daphnie Pillow frame. His   back area was prepped with Betadine and sterilely draped. At   this point, surgeon called a timeout, confirmed the procedure to   be performed, his allergy history and the fact that he had   received his preoperative IV antibiotics. In addition, the   patient is on Coumadin for history of multiple DVTs, PEs and he   had been placed on Lovenox bridge, his INR was 1 prior to   surgery. It should also be noted that an assistant was medically   necessary for this case because of the extensive soft tissue and   neural retraction involved. Scalpel was used to make an incision   over the lower lumbar spine. We dissected down through the   subcutaneous tissue with electrocautery to the deep fascia. Deep   fascia was incised on either sides of the spinous processes and   we dissected down along the lamina out the facet joint   bilaterally. We encountered a lot of bleeding during this. We   placed an angled curette in what was felt to be the L4-5   interlaminar space, took a lateral C-arm x-ray to confirm we   were at L4-5. We then stripped out the lateral gutter, exposing   the L3, 4 and 5 transverse processes and stripped down to the   membrane in between. Once again a lot of venous bleeding during   this part of the procedure. Once we had done this bilaterally,   we removed all the soft tissue off the dorsal spine. We removed   the spinous processes of L3, 4 and 5 with a rongeur and saved   this bone for local bone graft. We then burred down the lamina   of L3, 4 and 5 in the medial aspect of the facet joint   bilaterally with a high-speed bur. We then scraped the   ligamentum off the undersurface of the bone with an angled   curette and used Kerrison punches to perform a bilateral L3, 4,   and 5 total laminectomies, significant partial facetectomies and   foraminotomies. We identified all the nerve roots and made sure   they were free.  The stenosis was rather severe throughout, but most notably at L3-4. After we had completed the decompression,   the thecal sac expanded nicely. We also removed all the   ligamentum flavum. We then elected to place interbody devices to   recreate our lordosis to stretch open the foramen even further. We placed the interbody devices from the right side. We burred   out the medial aspect of the facet joint bilaterally. We   retracted neural structures medially, cauterized the epidural   veins, incised the disk annulus and removed disk with   pituitaries. We then used the distractors and the johny to   remove disk and prepare the endplates, angled curettes were used   as well. Once we had done this, we placed trials into the disk   space to determine the appropriate height of the interbody   device and we determined that at L4-5, a 12 mm device was   appropriate, whereas at L3-4, an 11 mm device was appropriate. So we obtained the interbody devices, packed them with local   bone graft, and inserted the 12 mm device at L4-5 and the 11 mm   interbody device at L3-L4, tamped them anteriorly and across the   midline. We then thoroughly irrigated the wound multiple times,   removed some more soft tissue and then we were ready to place   our instrumentation. We determined the appropriate starting   point for the L3, 4 and 5 cortical screws. We made a bur hole as   a start and then drilled the screw hole with the 4.5 mm drill,   then made sure we obtained a hole with no breaches and then we   probed this to confirm with a ball-tip probe and then we tapped   it with a 5.5 mm tap, reprobed and then we inserted 5.5 diameter   cortical screws of varying lengths at L3, 4 and 5 bilaterally   and took an AP and lateral C-arm x-rays that showed good   alignment of the spine, good lordosis, good placement of the   instrumentation. Interbody cages were well placed with good   distraction through the disk spaces.  We then placed rods of   appropriate length into the screws, placed the locking nuts and   torqued them tight and then we placed a cross connector securing   the right and left-sided erica between the L4 and L5 screws   bilaterally and tightened this into place. We then thoroughly   irrigated the wound again, suctioned it dry. We placed FloSeal   at the decompression site for hemostasis. We decorticated the   transverse processes of L3, 4 and 5 in the lateral bony margins   with a high-speed bur. We had previously harvested bone marrow   aspirate from the left iliac crest by inserting the needle in   the crest, suctioning off 10 mL of bone marrow aspirate which   was mixed with 20 mL of OsteoAMP and then the OsteoAMP and local   bone graft were placed in each lateral gutter and demineralized   bone matrix putty was pressed on top of this to keep bone graft   in place. We then placed a Hemovac drain deep in the wound and   brought it out through a separate proximal stab incision. At   this point, we were ready to close the wound. We approximated   the deep fascia with interrupted figure-of-eight stitches of #1   Vicryl. Subcutaneous tissue was closed over this with   interrupted stitches of 2-0 Vicryl and the skin was closed with   a running subcuticular stitch of 3-0 Vicryl. Steri-Strips and   dry sterile dressings were applied. The patient was then rolled   supine onto the recovery room bed, having tolerated the   procedure well.         MD JUAN C Maddox / DORITA   D:  08/08/2017   18:52   T:  08/08/2017   19:30   Job #:  [id number]

## 2017-08-08 NOTE — BRIEF OP NOTE
BRIEF OPERATIVE NOTE    Date of Procedure: 8/8/2017   Preoperative Diagnosis: Lumbar spinal stenosis [M48.06]  Spondylolisthesis, unspecified spinal region [M43.10]  Foot drop, bilateral [M21.371, M21.372]  Postoperative Diagnosis: Lumbar spinal stenosis [M48.06]  Spondylolisthesis, unspecified spinal region [M43.10]  Foot drop, bilateral [M21.371, M21.372]    Procedure(s):  L3-L5 LAMI FUSION TLIF  Surgeon(s) and Role:     * Ashleigh Daniels MD - Primary     * Humaira English MD - Assisting         Assistant Staff:       Surgical Staff:  Circ-1: Lety Marr RN  Radiology Technician: RT Hitesh  Scrub Tech-1: Triumfant  Event Time In   Incision Start 1515   Incision Close 1827     Anesthesia: General   Estimated Blood Loss: 750cc  Specimens: * No specimens in log *   Findings: stenosis   Complications: none  Implants:   Implant Name Type Inv.  Item Serial No.  Lot No. LRB No. Used Action   GRAFT BNE GRAN DBM 2-4MM 20ML -- OSTEOAMP - [id number]  GRAFT BNE GRAN DBM 2-4MM 20ML -- OSTEOAMP UDR--0005 [employer]  N/A 1 Implanted   GRAFT DBM PTTY+ W/CHIP 10ML -- BIO DBM - [id number]  GRAFT DBM PTTY+ W/CHIP 10ML -- BIO DBM  ZACH SPINE HOWM [id number] N/A 1 Implanted   GRAFT DBM PTTY+ W/CHIP 10ML -- BIO DBM - [id number]  GRAFT DBM PTTY+ W/CHIP 10ML -- BIO DBM  ZACH SPINE HOWM [id number] N/A 1 Implanted   BLOCKER SPNE CAP LCK -- NOEMY 4.5 CT - [id number]  BLOCKER SPNE CAP LCK -- NOEMY 4.5 CT  WAUKESHA [employer]Y [employer]ENT[employer] CTR SPINE HOWM [id number] N/A 6 Implanted   SCR BNE YUAN POLYAXL 5.5X40MM -- NOEMY 4.5 CT - [id number]  SCR BNE YUAN POLYAXL 5.5X40MM -- NOEMY 4.5 CT  WAUKESHA CTY MENTAL HLTH CTR SPINE HOWM [id number] N/A 5 Implanted   ZAHRA SPNE W/HEX 4.5X60MM VIT -- NOEMY 4.5 - [id number]  ZAHRA SPNE W/HEX 4.5X60MM VIT -- NOEMY 4.5  ZACH SPINE HOWM [id number] N/A 2 Implanted   SPACER SPNE VERT AVS 4D 12X30 --  - [id number]  SPACER SPNE VERT AVS 4D 12X30 --   ZACH SPINE HOWM [id number] N/A 2 Implanted   SCR BNE YUAN POLYAXL 5.5X35MM -- NOEMY 4.5 CT - [id number]  SCR BNE YUAN POLYAXL 5.5X35MM -- NOEMY 4.5 CT  WAUKESHA CTY MENTAL HLTH CTR SPINE HOWM [id number] N/A 1 Implanted   CONN CROSS SPNE POLYAXL MED -- NOEMY 4.5 - [id number]   CONN CROSS SPNE POLYAXL MED -- NOEMY 4.5   ZACH SPINE HOWM [id number] N/A 1 Implanted

## 2017-08-09 LAB
ANION GAP BLD CALC-SCNC: 12 MMOL/L (ref 7–16)
BUN SERPL-MCNC: 26 MG/DL (ref 6–23)
CALCIUM SERPL-MCNC: 8.3 MG/DL (ref 8.3–10.4)
CHLORIDE SERPL-SCNC: 99 MMOL/L (ref 98–107)
CO2 SERPL-SCNC: 28 MMOL/L (ref 21–32)
CREAT SERPL-MCNC: 1.45 MG/DL (ref 0.8–1.5)
ERYTHROCYTE [DISTWIDTH] IN BLOOD BY AUTOMATED COUNT: 15.4 % (ref 11.9–14.6)
GLUCOSE SERPL-MCNC: 126 MG/DL (ref 65–100)
HCT VFR BLD AUTO: 43 % (ref 41.1–50.3)
HGB BLD-MCNC: 14.3 G/DL (ref 13.6–17.2)
MCH RBC QN AUTO: 35 PG (ref 26.1–32.9)
MCHC RBC AUTO-ENTMCNC: 33.3 G/DL (ref 31.4–35)
MCV RBC AUTO: 105.1 FL (ref 79.6–97.8)
PLATELET # BLD AUTO: 125 K/UL (ref 150–450)
PMV BLD AUTO: 9.5 FL (ref 10.8–14.1)
POTASSIUM SERPL-SCNC: 5.3 MMOL/L (ref 3.5–5.1)
RBC # BLD AUTO: 4.09 M/UL (ref 4.23–5.67)
SODIUM SERPL-SCNC: 139 MMOL/L (ref 136–145)
WBC # BLD AUTO: 13.3 K/UL (ref 4.3–11.1)

## 2017-08-09 PROCEDURE — 74011250637 HC RX REV CODE- 250/637: Performed by: ORTHOPAEDIC SURGERY

## 2017-08-09 PROCEDURE — 36415 COLL VENOUS BLD VENIPUNCTURE: CPT | Performed by: ORTHOPAEDIC SURGERY

## 2017-08-09 PROCEDURE — 85027 COMPLETE CBC AUTOMATED: CPT | Performed by: ORTHOPAEDIC SURGERY

## 2017-08-09 PROCEDURE — 97530 THERAPEUTIC ACTIVITIES: CPT

## 2017-08-09 PROCEDURE — 74011250636 HC RX REV CODE- 250/636: Performed by: ORTHOPAEDIC SURGERY

## 2017-08-09 PROCEDURE — 74011636637 HC RX REV CODE- 636/637: Performed by: ORTHOPAEDIC SURGERY

## 2017-08-09 PROCEDURE — 80048 BASIC METABOLIC PNL TOTAL CA: CPT | Performed by: ORTHOPAEDIC SURGERY

## 2017-08-09 PROCEDURE — 97162 PT EVAL MOD COMPLEX 30 MIN: CPT

## 2017-08-09 PROCEDURE — 65270000029 HC RM PRIVATE

## 2017-08-09 PROCEDURE — 99253 IP/OBS CNSLTJ NEW/EST LOW 45: CPT | Performed by: PHYSICAL MEDICINE & REHABILITATION

## 2017-08-09 RX ORDER — ADHESIVE BANDAGE
30 BANDAGE TOPICAL DAILY PRN
Status: DISCONTINUED | OUTPATIENT
Start: 2017-08-09 | End: 2017-08-12 | Stop reason: HOSPADM

## 2017-08-09 RX ORDER — WARFARIN SODIUM 5 MG/1
10 TABLET ORAL
Status: DISCONTINUED | OUTPATIENT
Start: 2017-08-09 | End: 2017-08-12 | Stop reason: HOSPADM

## 2017-08-09 RX ADMIN — CEFAZOLIN 2 G: 1 INJECTION, POWDER, FOR SOLUTION INTRAMUSCULAR; INTRAVENOUS; PARENTERAL at 07:36

## 2017-08-09 RX ADMIN — Medication 10 ML: at 13:49

## 2017-08-09 RX ADMIN — FAMOTIDINE 20 MG: 20 TABLET ORAL at 20:28

## 2017-08-09 RX ADMIN — CEFAZOLIN 2 G: 1 INJECTION, POWDER, FOR SOLUTION INTRAMUSCULAR; INTRAVENOUS; PARENTERAL at 14:22

## 2017-08-09 RX ADMIN — CYCLOBENZAPRINE HYDROCHLORIDE 10 MG: 10 TABLET, FILM COATED ORAL at 22:20

## 2017-08-09 RX ADMIN — DIPHENHYDRAMINE HYDROCHLORIDE 25 MG: 25 CAPSULE ORAL at 02:28

## 2017-08-09 RX ADMIN — ENOXAPARIN SODIUM 40 MG: 40 INJECTION SUBCUTANEOUS at 08:44

## 2017-08-09 RX ADMIN — FAMOTIDINE 20 MG: 20 TABLET ORAL at 08:44

## 2017-08-09 RX ADMIN — CYCLOBENZAPRINE HYDROCHLORIDE 10 MG: 10 TABLET, FILM COATED ORAL at 02:28

## 2017-08-09 RX ADMIN — HYDROCODONE BITARTRATE AND ACETAMINOPHEN 1 TABLET: 10; 325 TABLET ORAL at 13:48

## 2017-08-09 RX ADMIN — TRAZODONE HYDROCHLORIDE 50 MG: 50 TABLET ORAL at 23:23

## 2017-08-09 RX ADMIN — HYDROMORPHONE HYDROCHLORIDE 1 MG: 1 INJECTION, SOLUTION INTRAMUSCULAR; INTRAVENOUS; SUBCUTANEOUS at 03:35

## 2017-08-09 RX ADMIN — HYDROMORPHONE HYDROCHLORIDE 1 MG: 1 INJECTION, SOLUTION INTRAMUSCULAR; INTRAVENOUS; SUBCUTANEOUS at 20:28

## 2017-08-09 RX ADMIN — HYDROMORPHONE HYDROCHLORIDE 1 MG: 1 INJECTION, SOLUTION INTRAMUSCULAR; INTRAVENOUS; SUBCUTANEOUS at 15:38

## 2017-08-09 RX ADMIN — CYCLOBENZAPRINE HYDROCHLORIDE 10 MG: 10 TABLET, FILM COATED ORAL at 08:44

## 2017-08-09 RX ADMIN — WARFARIN SODIUM 10 MG: 5 TABLET ORAL at 20:33

## 2017-08-09 RX ADMIN — Medication 10 ML: at 05:49

## 2017-08-09 RX ADMIN — Medication 10 ML: at 20:30

## 2017-08-09 RX ADMIN — NEBIVOLOL HYDROCHLORIDE 5 MG: 5 TABLET ORAL at 08:44

## 2017-08-09 RX ADMIN — Medication 10 ML: at 08:52

## 2017-08-09 RX ADMIN — PREDNISONE 20 MG: 20 TABLET ORAL at 08:44

## 2017-08-09 RX ADMIN — HYDROCODONE BITARTRATE AND ACETAMINOPHEN 1 TABLET: 10; 325 TABLET ORAL at 23:21

## 2017-08-09 RX ADMIN — DIPHENHYDRAMINE HYDROCHLORIDE 25 MG: 25 CAPSULE ORAL at 23:23

## 2017-08-09 RX ADMIN — HYDROCODONE BITARTRATE AND ACETAMINOPHEN 1 TABLET: 10; 325 TABLET ORAL at 05:50

## 2017-08-09 RX ADMIN — CYCLOBENZAPRINE HYDROCHLORIDE 10 MG: 10 TABLET, FILM COATED ORAL at 15:39

## 2017-08-09 RX ADMIN — HYDROCODONE BITARTRATE AND ACETAMINOPHEN 1 TABLET: 10; 325 TABLET ORAL at 02:28

## 2017-08-09 RX ADMIN — HYDROMORPHONE HYDROCHLORIDE 1 MG: 1 INJECTION, SOLUTION INTRAMUSCULAR; INTRAVENOUS; SUBCUTANEOUS at 08:48

## 2017-08-09 NOTE — ANESTHESIA POSTPROCEDURE EVALUATION
Post-Anesthesia Evaluation and Assessment    Patient: Carina Browne MRN: [id number]  SSN: [id number]    YOB: 1955  Age: 63 y.o. Sex: male       Cardiovascular Function/Vital Signs  Visit Vitals    /72    Pulse 82    Temp 36.6 °C (97.9 °F)    Resp 14    Wt 105.2 kg (232 lb)    SpO2 92%    BMI 30.61 kg/m2     Bedside sat 94%. Patient is status post general anesthesia for Procedure(s):  L3-L5 LAMI FUSION TLIF. Nausea/Vomiting: None    Postoperative hydration reviewed and adequate. Pain:  Pain Scale 1: Numeric (0 - 10) (08/1955)  Pain Intensity 1: 10 (08/1955)   Managed    Neurological Status:   Neuro (WDL): Exceptions to WDL (numbness right leg) (08/08/17 1308)   At baseline    Mental Status and Level of Consciousness: Arousable    Pulmonary Status:   O2 Device: Nasal cannula (08/08/17 1848)   Adequate oxygenation and airway patent    Complications related to anesthesia: None    Post-anesthesia assessment completed.  No concerns    Signed By: Rajesh Alexis MD     August 8, 2017

## 2017-08-09 NOTE — PROGRESS NOTES
Problem: Falls - Risk of  Goal: *Absence of Falls  Document Erick Fall Risk and appropriate interventions in the flowsheet. Outcome: Progressing Towards Goal  Fall Risk Interventions: Bed alarm on, call light & all items in reach.

## 2017-08-09 NOTE — PROGRESS NOTES
Verbal & Bedside shift change report given to Virgilio Hein (oncoming nurse) by Melany Barragan (offgoing nurse). Report given with SBAR, Kardex, Intake/Output, MAR and Recent Results.

## 2017-08-09 NOTE — PERIOP NOTES
TRANSFER - OUT REPORT:    Verbal report given to Araceli Mobley RN (name) on Elly Greco  being transferred to 2225(unit) for routine post - op       Report consisted of patients Situation, Background, Assessment and   Recommendations(SBAR). Information from the following report(s) SBAR, Kardex, OR Summary, Intake/Output and MAR was reviewed with the receiving nurse. Lines:   PICC Double Lumen 99/68/44 Left;Basilic (Active)       Peripheral IV 12/14/09 Left;Right; Lower Arm (Active)       Peripheral IV 08/08/17 Left Hand (Active)   Site Assessment Clean, dry, & intact 8/8/2017  6:48 PM   Phlebitis Assessment 0 8/8/2017  6:48 PM   Infiltration Assessment 0 8/8/2017  6:48 PM   Dressing Status Clean, dry, & intact 8/8/2017  6:48 PM   Dressing Type Transparent;Tape 8/8/2017  6:48 PM   Hub Color/Line Status Pink; Infusing 8/8/2017  6:48 PM       Peripheral IV 08/08/17 Right Hand (Active)   Site Assessment Clean, dry, & intact 8/8/2017  6:48 PM   Phlebitis Assessment 0 8/8/2017  6:48 PM   Infiltration Assessment 0 8/8/2017  6:48 PM   Dressing Status Clean, dry, & intact 8/8/2017  6:48 PM   Dressing Type Transparent;Tape 8/8/2017  6:48 PM   Hub Color/Line Status Emogene Richer; Infusing 8/8/2017  6:48 PM        Opportunity for questions and clarification was provided. Patient transported with:   O2 @ 2 liters    VTE prophylaxis orders have been written for Elly Greco.    Patient and family given floor number and nurses name. Family updated re: pt status after security code verified.

## 2017-08-09 NOTE — PROGRESS NOTES
Dual skin shift assessment completed with Arsen Johnson RN. No redness or breakdown noted to sacrum. Back incision with small dime size amount of serosanguinous drainage noted to dressing.

## 2017-08-09 NOTE — PROGRESS NOTES
TRANSFER - IN REPORT:    Verbal report received from Asaf(name) on [facility]  being received from PACU(unit) for routine post - op      Report consisted of patients Situation, Background, Assessment and   Recommendations(SBAR). Information from the following report(s) SBAR, Kardex, Intake/Output, MAR and Recent Results was reviewed with the receiving nurse. Opportunity for questions and clarification was provided. Assessment completed upon patients arrival to unit and care assumed.

## 2017-08-09 NOTE — PROGRESS NOTES
ORTHO PROGRESS NOTE    2017    Admit Date: 2017  Admit Diagnosis: Lumbar spinal stenosis [M48.06]  Spondylolisthesis, unspecified spinal region [M43.10]  Foot drop, bilateral [M21.371, M21.372]  Post Op day: 1 Day Post-Op      Subjective:     Refugio Perry is a patient who is now 1 Day Post-Op  and has complaints  of pain, sitting in chair. .       Objective:     PT/OT: progressing        Vital Signs:    Patient Vitals for the past 8 hrs:   BP Temp Pulse Resp SpO2   17 1105 109/80 95.9 °F (35.5 °C) 77 18 95 %   17 0709 152/70 97.8 °F (36.6 °C) 77 18 93 %     Temp (24hrs), Av.4 °F (36.3 °C), Min:95.9 °F (35.5 °C), Max:98 °F (36.7 °C)      LAB:    Recent Labs      17   0529   HGB  14.3   WBC  13.3*   PLT  125*       I/O:   07 -  1900  In: 1046.7 [I.V.:1046.7]  Out: 750 [Urine:750]   1901 -  0700  In: 3958.3 [P.O.:600; I.V.:3358.3]  Out: 0 [Urine:800; Drains:75]    Physical Exam:    Awake and in no acute distress. Mood and affect appropriate. Respirations unlabored and no evidence cyanosis. Calves nontender. Abdomen soft and nontender. Dressing clean/dry  No new neurologic deficit. Left ADF improved from preop.      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

## 2017-08-09 NOTE — PROGRESS NOTES
Pt sitting up in bedside chair s/p spinal stenoisis of lumbar region surgery with Dr. Faoroq Woodard. Lives alone, was independent prior to admission. Using walker to ambulate with. Pt has Milderd Ginger that assist him as needed and 2 children, daughter, Emerson Dimas in [place] and son in [facility]. Pt has LW/HCPOA and daughter is decision maker for pt. On file here at [facility] and verified. Has PCP and able to get rx with drug plan. Discussed d/c POC for IRC/9th floor. Awaiting insurance approval. Pt agrees with POC. CM will continue follow for d/c needs. Care Management Interventions  PCP Verified by CM: Yes  Mode of Transport at Discharge: Other (see comment)  Transition of Care Consult (CM Consult):  (IRC/9th floor)  Physical Therapy Consult: Yes  Current Support Network: Own Home, Lives Alone  Confirm Follow Up Transport: Family  Plan discussed with Pt/Family/Caregiver: Yes  Freedom of Choice Offered:  Yes

## 2017-08-09 NOTE — PROGRESS NOTES
Problem: Mobility Impaired (Adult and Pediatric)  Goal: *Acute Goals and Plan of Care (Insert Text)  LongTerm Goals:  1. Mr. Tim Alcantara will move from supine to sit and sit to supine in flat bed via log roll with CONTACT GUARD ASSIST within 7 day(s). 2. Mr. Tim Alcantara will transfer from sit to stand and stand to sit with CONTACT GUARD ASSIST within 7 days. 3. Mr. Tim Alcantara will ambulate with CONTACT GUARD ASSIST for 100+ feet with the least restrictive device within 7 day(s). 4. Mr. Tim Alcantara will verbalize 3/3 spinal precautions to ensure back safety during functional activities within 7 days. ________________________________________________________________________      PHYSICAL THERAPY: INITIAL ASSESSMENT, TREATMENT DAY: DAY OF ASSESSMENT, AM 8/9/2017  INPATIENT: Hospital Day: 2  Pay[place]UE [place] / [place] [address] / Product Type: PPO /    Brace on when OOB  NAME/AGE/GENDER: Nabil Jefferson is a 63 y.o. male       PRIMARY DIAGNOSIS: Lumbar spinal stenosis [M48.06]  Spondylolisthesis, unspecified spinal region [M43.10]  Foot drop, bilateral [M21.371, M21.372] Spinal stenosis of lumbar region Spinal stenosis of lumbar region  Procedure(s) (LRB):  L3-L5 LAMI FUSION TLIF (N/A)  1 Day Post-Op  ICD-10: Treatment Diagnosis:       · Difficulty in walking, Not elsewhere classified (R26.2)  · Other abnormalities of gait and mobility (R26.89)  · Low Back Pain (M54.5)   Precaution/Allergies:  Adhesive       ASSESSMENT:      Mr. Tim Alcantara presents supine in bed and agreeable for PT assessment. He endorses 9/10 pain, but was recently medicated per RN. Patient with weakness of B dorsiflexors R>L. Instructed patient in spine precautions. Patient rolled to sidelying with mod assist then to sit with min to mod assist.  Patient required assistance to don his brace. He stood with min assist and cueing for hand placement and technique. He ambulated 5' to chair very slowly with shuffling, short steps.   He would benefit from AFO KISHOR ALLEN and patient reports that MD ordered AFOs and they are currently at prosthetist's. Patient has declined in functional mobility and is at high risk for falling due to weakness and paresthesia of TRAMAINE ALLEN's. Mr. Vane Hernandez would benefit from skilled physical therapy (medically necessary) to address his deficits and maximize his function. He would be a great candidate for [facility] as he was very independent and active at baseline. This section established at most recent assessment   PROBLEM LIST (Impairments causing functional limitations):  1. Decreased Strength  2. Decreased ADL/Functional Activities  3. Decreased Transfer Abilities  4. Decreased Ambulation Ability/Technique  5. Decreased Balance  6. Increased Pain  7. Decreased Activity Tolerance  8. Decreased Knowledge of Precautions  9. Decreased [place] with Home Exercise Program    INTERVENTIONS PLANNED: (Benefits and precautions of physical therapy have been discussed with the patient.)  1. Balance Exercise  2. Bed Mobility  3. Gait Training  4. Therapeutic Activites  5. Therapeutic Exercise/Strengthening  6. Transfer Training  7. education  8. Group Therapy      TREATMENT PLAN: Frequency/Duration: twice daily for duration of hospital stay  Rehabilitation Potential For Stated Goals: EXCELLENT      RECOMMENDED REHABILITATION/EQUIPMENT: (at time of discharge pending progress): Continue Skilled Therapy and Rehab. HISTORY:   History of Present Injury/Illness (Reason for Referral):  Per MD note, \"This is a very pleasant 63year old patient who presents with a 2 week history of acute onset of bilateral leg pain and foot drop after a fall last week. He was on vacation and suffered to back-to-back falls. He then noticed that he had difficulty walking after these falls. He had tingling that started in his leg and now he cannot lift either foot because of acute foot drop. He was referred for further evaluation of his lumbar spine by Dr. Tash Sands.   MRI of the lumbar spine reveals severe spinal stenosis at L3-4 with lateral recess stenosis as well. At L4-5 there is a grade 1 anterolisthesis severe facet arthropathy and mild central and some lateral recess stenosis primarily on the left. There is degenerative changes and moderate left L5 lateral recess and foraminal stenosis at L5-S1. Patient is in a wheelchair today. He has difficulty walking any distance. He has pain in the back and radiates down each leg. He rates his pain a 10/10. He denies any change in bowel or bladder function. Diclofenac, Norco.  Patient takes daily Coumadin due to a history of 3 DVTs and a pulmonary embolism. He underwent a lobectomy due to aspirate was infection and his previous pulmonary embolism. He has come off of his Coumadin the past for surgery but has to be on a Lovenox bridge. His Coumadin as prescribed by Dr. Jazmine Hickey, his primary care physician. He is currently completing a prednisone pack. \"  Past Medical History/Comorbidities:   Mr. Dean Whitaker  has a past medical history of Arthritis; Cellulitis and abscess of trunk (December, 2009); Chronic pain; Depression; Ex-smoker for more than 1 year; Hypertension; Insomnia; PE (pulmonary thromboembolism) ([address] (2007); Renal failure (12/10/2009); S/P lobectomy of lung; and Thromboembolus ([address] (2009, 2011). He also has no past medical history of Coagulation defects; COPD; Other ill-defined conditions; or Unspecified adverse effect of anesthesia. Mr. Dean Whitaker  has a past surgical history that includes hip replacement (2007); thoracotomy (December, 14, 2009); lobectomy (12/01/2009); chest surgery procedure unlisted; vascular surgery procedure unlist (2009); and gi (April, 2008).   Social History/Living Environment:   Home Environment: Trailer/mobile home  # Steps to Enter: 2  One/Two Story Residence: One story  Living Alone: Yes  Support Systems: Friends \ neighbors, Family member(s), Spouse/Significant Other/Partner  Patient Expects to be Discharged to[de-identified] Trailer/mobile home  Current DME Used/Available at Home: Walker  Prior Level of Function/Work/Activity:  Lives alone, works, drives. Patient has been ambulating with RW recently due to hip pain and needing a EJ. Number of Personal Factors/Comorbidities that affect the Plan of Care: 3+: HIGH COMPLEXITY   EXAMINATION:   Most Recent Physical Functioning:   Gross Assessment:  AROM: Generally decreased, functional  Strength: Generally decreased, functional (except ankle dorsiflexors R 1/5, L 2+/5)  Sensation: Impaired (B LE's)               Posture:     Balance:  Sitting: Impaired  Sitting - Static: Good (unsupported)  Sitting - Dynamic: Prop sitting  Standing: Impaired  Standing - Static: Constant support  Standing - Dynamic : Poor Bed Mobility:  Rolling: Moderate assistance  Supine to Sit: Minimum assistance; Moderate assistance  Scooting: Contact guard assistance  Wheelchair Mobility:     Transfers:  Sit to Stand: Minimum assistance  Stand to Sit: Minimum assistance  Bed to Chair: Minimum assistance  Gait:     Speed/Lillie: Pace decreased (<100 feet/min); Shuffled; Slow  Step Length: Left shortened;Right shortened  Swing Pattern: Right asymmetrical  Gait Abnormalities: Decreased step clearance;Shuffling gait; Step to gait  Distance (ft): 5 Feet (ft)  Assistive Device: Gait belt;Orthotic device; Walker, rolling  Ambulation - Level of Assistance: Minimal assistance       Body Structures Involved:  1. Nerves  2. Bones  3. Joints  4. Muscles  5. Ligaments Body Functions Affected:  1. Sensory/Pain  2. Neuromusculoskeletal  3. Movement Related  4. Skin Related Activities and Participation Affected:  1. Mobility  2. Self Care  3.  Domestic Life   Number of elements that affect the Plan of Care: 4+: HIGH COMPLEXITY   CLINICAL PRESENTATION:   Presentation: Evolving clinical presentation with changing clinical characteristics: MODERATE COMPLEXITY   CLINICAL DECISION MAKING:   [employer] AM-PAC 2 Clicks   Basic Mobility Inpatient Short Form  How much difficulty does the patient currently have. .. Unable A Lot A Little None   1. Turning over in bed (including adjusting bedclothes, sheets and blankets)? [ ] 1   [X] 2   [ ] 3   [ ] 4   2. Sitting down on and standing up from a chair with arms ( e.g., wheelchair, bedside commode, etc.)   [ ] 1   [ ] 2   [X] 3   [ ] 4   3. Moving from lying on back to sitting on the side of the bed? [ ] 1   [X] 2   [ ] 3   [ ] 4   How much help from another person does the patient currently need. .. Total A Lot A Little None   4. Moving to and from a bed to a chair (including a wheelchair)? [ ] 1   [ ] 2   [X] 3   [ ] 4   5. Need to walk in hospital room? [ ] 1   [ ] 2   [X] 3   [ ] 4   6. Climbing 3-5 steps with a railing? [ ] 1   [X] 2   [ ] 3   [ ] 4   © 2007, Trustees of [address] [place] [address], under license to [employer]. All rights reserved    Score:  Initial: 15 Most Recent: X (Date: -- )     Interpretation of Tool:  Represents activities that are increasingly more difficult (i.e. Bed mobility, Transfers, Gait). Score 24 23 22-20 19-15 14-10 9-7 6       Modifier CH CI CJ CK CL CM CN         · Mobility - Walking and Moving Around:               - CURRENT STATUS:    CK - 40%-59% impaired, limited or restricted               - GOAL STATUS:           CJ - 20%-39% impaired, limited or restricted               - D/C STATUS:                       ---------------To be determined---------------  Payor[place] BLUE CR[place] [facility] / Product Type: PPO /       Medical Necessity:     · Patient is expected to demonstrate progress in strength, range of motion, balance, coordination and functional technique to increase independence with   and improve safety during all functional mobility.   Reason for Services/Other Comments:  · Patient continues to require skilled intervention due to medical complications and decline in functional mobiltiy. Use of outcome tool(s) and clinical judgement create a POC that gives a: Questionable prediction of patient's progress: MODERATE COMPLEXITY                 TREATMENT:   (In addition to Assessment/Re-Assessment sessions the following treatments were rendered)   Pre-treatment Symptoms/Complaints:    Pain: Initial:   Pain Intensity 1: 9  Pain Location 1: Back, Incisional  Pain Intervention(s) 1: Ambulation/Increased Activity, Repositioned  Post Session:  8/10      Assessment/Reassessment only, no treatment provided today     Braces/Orthotics/Lines/Etc:   · none  Treatment/Session Assessment:    · Response to Treatment:  See above. · Interdisciplinary Collaboration:  · Physical Therapist  · Registered Nurse  ·   · After treatment position/precautions:  · Up in chair  · Bed/Chair-wheels locked  · Call light within reach  · RN notified  · Compliance with Program/Exercises: compliant all of the time. · Recommendations/Intent for next treatment session: \"Next visit will focus on advancements to more challenging activities and reduction in assistance provided\".   Total Treatment Duration:  PT Patient Time In/Time Out  Time In: 0925  Time Out: [place], PT

## 2017-08-09 NOTE — PROGRESS NOTES
Problem: Mobility Impaired (Adult and Pediatric)  Goal: *Acute Goals and Plan of Care (Insert Text)  LongTerm Goals:  1. Mr. Aida Coleman will move from supine to sit and sit to supine in flat bed via log roll with CONTACT GUARD ASSIST within 7 day(s). 2. Mr. Aida Coleman will transfer from sit to stand and stand to sit with CONTACT GUARD ASSIST within 7 days. 3. Mr. Aida Coleman will ambulate with CONTACT GUARD ASSIST for 100+ feet with the least restrictive device within 7 day(s). 4. Mr. Aida Coleman will verbalize 3/3 spinal precautions to ensure back safety during functional activities within 7 days. ________________________________________________________________________      PHYSICAL THERAPY: Daily Note, Treatment Day: Day of Assessment and PM 8/9/2017  INPATIENT: Hospital Day: 2  Payor: BLUE CROSS / Plan: [place] BLUE CROSS [facility] / Product Type: PPO /    Brace on when OOB  NAME/AGE/GENDER: Sri Jones is a 63 y.o. male       PRIMARY DIAGNOSIS: Lumbar spinal stenosis [M48.06]  Spondylolisthesis, unspecified spinal region [M43.10]  Foot drop, bilateral [M21.371, M21.372] Spinal stenosis of lumbar region Spinal stenosis of lumbar region  Procedure(s) (LRB):  L3-L5 LAMI FUSION TLIF (N/A)  1 Day Post-Op  ICD-10: Treatment Diagnosis:       · Difficulty in walking, Not elsewhere classified (R26.2)  · Other abnormalities of gait and mobility (R26.89)  · Low Back Pain (M54.5)   Precaution/Allergies:  Adhesive       ASSESSMENT:      Mr. Aida Coleman presents sitting in chair and ready to go back to bed. He stood with mod assist and cueing for hand placement and technique. He ambulated 15' to  Bed very slowly with shuffling, short steps, leaning heavily on UE's. He would benefit from AFO R LE and patient reports that MD ordered AFOs and they are currently at prosthetist's. Sat with min assist and doffed brace with total assist. To sidelying with mod assist for LE's then log roll to supine.   Required total assist to scoot in bed and reposition. Patient very fatigued. Demonstrated progress with gait despite severe pain. Required more assist with sit to stand due to chair lower than bed. Mr. Mary Jackson would benefit from skilled physical therapy (medically necessary) to address his deficits and maximize his function. He would be a great candidate for [facility] as he was very independent and active at baseline. This section established at most recent assessment   PROBLEM LIST (Impairments causing functional limitations):  1. Decreased Strength  2. Decreased ADL/Functional Activities  3. Decreased Transfer Abilities  4. Decreased Ambulation Ability/Technique  5. Decreased Balance  6. Increased Pain  7. Decreased Activity Tolerance  8. Decreased Knowledge of Precautions  9. Decreased [place] with Home Exercise Program    INTERVENTIONS PLANNED: (Benefits and precautions of physical therapy have been discussed with the patient.)  1. Balance Exercise  2. Bed Mobility  3. Gait Training  4. Therapeutic Activites  5. Therapeutic Exercise/Strengthening  6. Transfer Training  7. education  8. Group Therapy      TREATMENT PLAN: Frequency/Duration: twice daily for duration of hospital stay  Rehabilitation Potential For Stated Goals: EXCELLENT      RECOMMENDED REHABILITATION/EQUIPMENT: (at time of discharge pending progress): Continue Skilled Therapy and Rehab. HISTORY:   History of Present Injury/Illness (Reason for Referral):  Per MD note, \"This is a very pleasant 63year old patient who presents with a 2 week history of acute onset of bilateral leg pain and foot drop after a fall last week. He was on vacation and suffered to back-to-back falls. He then noticed that he had difficulty walking after these falls. He had tingling that started in his leg and now he cannot lift either foot because of acute foot drop. He was referred for further evaluation of his lumbar spine by Dr. Eunice Pacheco.   MRI of the lumbar spine reveals severe spinal stenosis at L3-4 with lateral recess stenosis as well. At L4-5 there is a grade 1 anterolisthesis severe facet arthropathy and mild central and some lateral recess stenosis primarily on the left. There is degenerative changes and moderate left L5 lateral recess and foraminal stenosis at L5-S1. Patient is in a wheelchair today. He has difficulty walking any distance. He has pain in the back and radiates down each leg. He rates his pain a 10/10. He denies any change in bowel or bladder function. Diclofenac, Norco.  Patient takes daily Coumadin due to a history of 3 DVTs and a pulmonary embolism. He underwent a lobectomy due to aspirate was infection and his previous pulmonary embolism. He has come off of his Coumadin the past for surgery but has to be on a Lovenox bridge. His Coumadin as prescribed by Dr. Abdirizak Colin, his primary care physician. He is currently completing a prednisone pack. \"  Past Medical History/Comorbidities:   Mr. Itzel Long  has a past medical history of Arthritis; Cellulitis and abscess of trunk (December, 2009); Chronic pain; Depression; Ex-smoker for more than 1 year; Hypertension; Insomnia; PE (pulmonary thromboembolism) ([facility] [address] (2007); Renal failure (12/10/2009); S/P lobectomy of lung; and Thromboembolus ([facility] [address] (2009, 2011). He also has no past medical history of Coagulation defects; COPD; Other ill-defined conditions; or Unspecified adverse effect of anesthesia. Mr. Itzel Long  has a past surgical history that includes hip replacement (2007); thoracotomy (December, 14, 2009); lobectomy (12/01/2009); chest surgery procedure unlisted; vascular surgery procedure unlist (2009); and gi (April, 2008).   Social History/Living Environment:   Home Environment: Trailer/mobile home  # Steps to Enter: 2  One/Two Story Residence: One story  Living Alone: Yes  Support Systems: Friends \ neighbors, Family member(s), Spouse/Significant Other/Partner  Patient Expects to be Discharged to[de-identified] Trailer/mobile home  Current DME Used/Available at Home: Walker  Prior Level of Function/Work/Activity:  Lives alone, works, drives. Patient has been ambulating with RW recently due to hip pain and needing a EJ. Number of Personal Factors/Comorbidities that affect the Plan of Care: 3+: HIGH COMPLEXITY   EXAMINATION:   Most Recent Physical Functioning:   Gross Assessment:  AROM: Generally decreased, functional  Strength: Generally decreased, functional (except ankle dorsiflexors R 1/5, L 2+/5)  Sensation: Impaired (B LE's)               Posture:     Balance:  Sitting: Impaired  Sitting - Static: Good (unsupported)  Sitting - Dynamic: Prop sitting  Standing: Impaired  Standing - Static: Constant support  Standing - Dynamic : Poor Bed Mobility:  Rolling: Moderate assistance  Supine to Sit: Minimum assistance; Moderate assistance  Sit to Supine: Moderate assistance  Scooting: Total assistance  Wheelchair Mobility:     Transfers:  Sit to Stand: Moderate assistance (from low chair)  Stand to Sit: Minimum assistance  Bed to Chair: Minimum assistance  Gait:     Speed/Lillie: Slow  Step Length: Right shortened;Left shortened  Swing Pattern: Right symmetrical  Gait Abnormalities: Shuffling gait  Distance (ft): 15 Feet (ft)  Assistive Device: Gait belt;Orthotic device; Walker, rolling  Ambulation - Level of Assistance: Minimal assistance       Body Structures Involved:  1. Nerves  2. Bones  3. Joints  4. Muscles  5. Ligaments Body Functions Affected:  1. Sensory/Pain  2. Neuromusculoskeletal  3. Movement Related  4. Skin Related Activities and Participation Affected:  1. Mobility  2. Self Care  3.  Domestic Life   Number of elements that affect the Plan of Care: 4+: HIGH COMPLEXITY   CLINICAL PRESENTATION:   Presentation: Evolving clinical presentation with changing clinical characteristics: MODERATE COMPLEXITY   CLINICAL DECISION MAKIN [facility] Inpatient Short Form  How much difficulty does the patient currently have. .. Unable A Lot A Little None   1. Turning over in bed (including adjusting bedclothes, sheets and blankets)? [ ] 1   [X] 2   [ ] 3   [ ] 4   2. Sitting down on and standing up from a chair with arms ( e.g., wheelchair, bedside commode, etc.)   [ ] 1   [ ] 2   [X] 3   [ ] 4   3. Moving from lying on back to sitting on the side of the bed? [ ] 1   [X] 2   [ ] 3   [ ] 4   How much help from another person does the patient currently need. .. Total A Lot A Little None   4. Moving to and from a bed to a chair (including a wheelchair)? [ ] 1   [ ] 2   [X] 3   [ ] 4   5. Need to walk in hospital room? [ ] 1   [ ] 2   [X] 3   [ ] 4   6. Climbing 3-5 steps with a railing? [ ] 1   [X] 2   [ ] 3   [ ] 4   © 2007, Trustees of [address] [place] [address] [address] [id number], under license to [employer]. All rights reserved    Score:  Initial: 15 Most Recent: X (Date: -- )     Interpretation of Tool:  Represents activities that are increasingly more difficult (i.e. Bed mobility, Transfers, Gait). Score 24 23 22-20 19-15 14-10 9-7 6       Modifier CH CI CJ CK CL CM CN         · Mobility - Walking and Moving Around:               - CURRENT STATUS:    CK - 40%-59% impaired, limited or restricted               - GOAL STATUS:           CJ - 20%-39% impaired, limited or restricted               - D/C STATUS:                       ---------------To be determined---------------  Payor: BLUE CROSS / Plan: [facility] / Product Type: PPO /       Medical Necessity:     · Patient is expected to demonstrate progress in strength, range of motion, balance, coordination and functional technique to increase independence with   and improve safety during all functional mobility. Reason for Services/Other Comments:  · Patient continues to require skilled intervention due to medical complications and decline in functional mobiltiy.    Use of outcome tool(s) and clinical judgement create a POC that gives a: Questionable prediction of patient's progress: MODERATE COMPLEXITY                 TREATMENT:   (In addition to Assessment/Re-Assessment sessions the following treatments were rendered)   Pre-treatment Symptoms/Complaints:    Pain: Initial:   Pain Intensity 1: 8  Pain Location 1: Back  Pain Intervention(s) 1: Repositioned  Post Session:  8/10      Therapeutic Activity: (    23 minutes): Therapeutic activities including Bed transfers, Chair transfers, Ambulation on level ground and instruction in spine precautions  to improve mobility, strength and balance. Required minimal cueing   to promote correct body mechanics with all mobility. Braces/Orthotics/Lines/Etc:   · back brace on while up  Treatment/Session Assessment:    · Response to Treatment:  See above. · Interdisciplinary Collaboration:  · Physical Therapist and Registered Nurse  · After treatment position/precautions:  · Supine in bed, Bed/Chair-wheels locked, Call light within reach, RN notified and Nurse at bedside  · Compliance with Program/Exercises: compliant all of the time. · Recommendations/Intent for next treatment session: \"Next visit will focus on advancements to more challenging activities and reduction in assistance provided\".   Total Treatment Duration:PT Patient Time In/Time Out  Time In: 9645  Time Out: 0720 Nikolas Jones PT

## 2017-08-10 LAB
ERYTHROCYTE [DISTWIDTH] IN BLOOD BY AUTOMATED COUNT: 15.1 % (ref 11.9–14.6)
HCT VFR BLD AUTO: 38 % (ref 41.1–50.3)
HGB BLD-MCNC: 12.7 G/DL (ref 13.6–17.2)
INR PPP: 1 (ref 0.9–1.2)
MCH RBC QN AUTO: 34.7 PG (ref 26.1–32.9)
MCHC RBC AUTO-ENTMCNC: 33.4 G/DL (ref 31.4–35)
MCV RBC AUTO: 103.8 FL (ref 79.6–97.8)
PLATELET # BLD AUTO: 98 K/UL (ref 150–450)
PMV BLD AUTO: 9 FL (ref 10.8–14.1)
PROTHROMBIN TIME: 10.7 SEC (ref 9.6–12)
RBC # BLD AUTO: 3.66 M/UL (ref 4.23–5.67)
WBC # BLD AUTO: 8.8 K/UL (ref 4.3–11.1)

## 2017-08-10 PROCEDURE — 85027 COMPLETE CBC AUTOMATED: CPT | Performed by: ORTHOPAEDIC SURGERY

## 2017-08-10 PROCEDURE — 85610 PROTHROMBIN TIME: CPT | Performed by: PHYSICAL MEDICINE & REHABILITATION

## 2017-08-10 PROCEDURE — 65270000029 HC RM PRIVATE

## 2017-08-10 PROCEDURE — 74011250636 HC RX REV CODE- 250/636: Performed by: ORTHOPAEDIC SURGERY

## 2017-08-10 PROCEDURE — 74011250637 HC RX REV CODE- 250/637: Performed by: ORTHOPAEDIC SURGERY

## 2017-08-10 PROCEDURE — 36415 COLL VENOUS BLD VENIPUNCTURE: CPT | Performed by: ORTHOPAEDIC SURGERY

## 2017-08-10 PROCEDURE — 74011636637 HC RX REV CODE- 636/637: Performed by: ORTHOPAEDIC SURGERY

## 2017-08-10 PROCEDURE — 97530 THERAPEUTIC ACTIVITIES: CPT

## 2017-08-10 RX ORDER — BISACODYL 5 MG
10 TABLET, DELAYED RELEASE (ENTERIC COATED) ORAL DAILY PRN
Status: DISCONTINUED | OUTPATIENT
Start: 2017-08-10 | End: 2017-08-12 | Stop reason: HOSPADM

## 2017-08-10 RX ORDER — BISACODYL 5 MG
10 TABLET, DELAYED RELEASE (ENTERIC COATED) ORAL ONCE
Status: COMPLETED | OUTPATIENT
Start: 2017-08-10 | End: 2017-08-10

## 2017-08-10 RX ORDER — HYDROCODONE BITARTRATE AND ACETAMINOPHEN 10; 325 MG/1; MG/1
1 TABLET ORAL
Qty: 50 TAB | Refills: 0 | Status: SHIPPED | OUTPATIENT
Start: 2017-08-10 | End: 2017-08-30

## 2017-08-10 RX ORDER — AMOXICILLIN 250 MG
2 CAPSULE ORAL EVERY 12 HOURS
Status: DISCONTINUED | OUTPATIENT
Start: 2017-08-10 | End: 2017-08-12 | Stop reason: HOSPADM

## 2017-08-10 RX ADMIN — FAMOTIDINE 20 MG: 20 TABLET ORAL at 20:38

## 2017-08-10 RX ADMIN — Medication 10 ML: at 07:36

## 2017-08-10 RX ADMIN — HYDROCODONE BITARTRATE AND ACETAMINOPHEN 1 TABLET: 10; 325 TABLET ORAL at 03:44

## 2017-08-10 RX ADMIN — Medication 10 ML: at 13:07

## 2017-08-10 RX ADMIN — HYDROMORPHONE HYDROCHLORIDE 1 MG: 1 INJECTION, SOLUTION INTRAMUSCULAR; INTRAVENOUS; SUBCUTANEOUS at 01:35

## 2017-08-10 RX ADMIN — STANDARDIZED SENNA CONCENTRATE AND DOCUSATE SODIUM 2 TABLET: 8.6; 5 TABLET, FILM COATED ORAL at 20:38

## 2017-08-10 RX ADMIN — Medication 10 ML: at 03:44

## 2017-08-10 RX ADMIN — HYDROMORPHONE HYDROCHLORIDE 1 MG: 1 INJECTION, SOLUTION INTRAMUSCULAR; INTRAVENOUS; SUBCUTANEOUS at 07:35

## 2017-08-10 RX ADMIN — CYCLOBENZAPRINE HYDROCHLORIDE 10 MG: 10 TABLET, FILM COATED ORAL at 03:44

## 2017-08-10 RX ADMIN — HYDROCODONE BITARTRATE AND ACETAMINOPHEN 1 TABLET: 10; 325 TABLET ORAL at 13:07

## 2017-08-10 RX ADMIN — Medication 10 ML: at 20:41

## 2017-08-10 RX ADMIN — PREDNISONE 20 MG: 20 TABLET ORAL at 08:54

## 2017-08-10 RX ADMIN — ENOXAPARIN SODIUM 40 MG: 40 INJECTION SUBCUTANEOUS at 08:54

## 2017-08-10 RX ADMIN — WARFARIN SODIUM 10 MG: 5 TABLET ORAL at 20:38

## 2017-08-10 RX ADMIN — HYDROCODONE BITARTRATE AND ACETAMINOPHEN 1 TABLET: 10; 325 TABLET ORAL at 20:38

## 2017-08-10 RX ADMIN — FAMOTIDINE 20 MG: 20 TABLET ORAL at 08:54

## 2017-08-10 RX ADMIN — HYDROMORPHONE HYDROCHLORIDE 1 MG: 1 INJECTION, SOLUTION INTRAMUSCULAR; INTRAVENOUS; SUBCUTANEOUS at 14:46

## 2017-08-10 RX ADMIN — NEBIVOLOL HYDROCHLORIDE 5 MG: 5 TABLET ORAL at 08:54

## 2017-08-10 RX ADMIN — DIPHENHYDRAMINE HYDROCHLORIDE 25 MG: 25 CAPSULE ORAL at 03:44

## 2017-08-10 RX ADMIN — HYDROMORPHONE HYDROCHLORIDE 1 MG: 1 INJECTION, SOLUTION INTRAMUSCULAR; INTRAVENOUS; SUBCUTANEOUS at 21:26

## 2017-08-10 RX ADMIN — BISACODYL 10 MG: 5 TABLET, COATED ORAL at 20:38

## 2017-08-10 NOTE — PROGRESS NOTES
POD 2    Drain - 60cc  Hgb- 12.7    AF,VSS  Pt feels better, leg numbness resolved, still postop back pain, voiding  Pt now able to PF and DF both feet today  Dressing okay  Improving, await transfer to 9 floor

## 2017-08-10 NOTE — PROGRESS NOTES
PM&R Consult Progress Note      Patient: Luz Woodbine  Admit Date: 8/8/2017  Admit Diagnosis: Lumbar spinal stenosis [M48.06]; Spondylolisthesis, unspecif*  Recommendations: Continue Acute Rehab Program, Coordination of rehab/medical care, Counseling of PM & R care issues management, Hospital Inpatient Rehab  - awaiting [facility] precert. All info sent in yesterday. Still had 250ml from drain. Ok to transfer with drain in place.  -on 10mg of coumadin since POD#1; no PT and INR ordered; will check INR now and daily and dc Lovenox when INR therapeutic  History/Subjective/Complaint:     Patient seen and examined. Records reviewed. Reports having more pain today. Thinks its from just laying in bed. Feels a bit stronger today. No BM    Pain 1  Pain Scale 1: Visual (08/10/17 0830)  Pain Intensity 1: 0 (08/10/17 0830)  Patient Stated Pain Goal: 4 (08/10/17 0830)  Pain Reassessment 1: Patient sleeping (08/10/17 0830)  Pain Onset 1: post op (08/10/17 0744)  Pain Location 1: Back; Incisional (08/10/17 0744)  Pain Orientation 1: Lower; Posterior (08/10/17 0744)  Pain Description 1: Aching; Throbbing (08/10/17 0744)  Pain Intervention(s) 1: Medication (see MAR); Repositioned (08/10/17 0744)     Objective:     Vitals:  Patient Vitals for the past 8 hrs:   BP Temp Pulse Resp SpO2   08/10/17 1043 133/77 98.5 °F (36.9 °C) 79 18 95 %   08/10/17 0655 124/68 98.2 °F (36.8 °C) 75 18 96 %      Intake and Output:  08/08 1901 - 08/10 0700  In: 4881.7 [P.O.:2320;  I.V.:2561.7]  Out: 2525 [Urine:2250; Drains:325]    Allergies   Allergen Reactions    Adhesive Contact Dermatitis     Current Facility-Administered Medications   Medication Dose Route Frequency    magnesium hydroxide (MILK OF MAGNESIA) 400 mg/5 mL oral suspension 30 mL  30 mL Oral DAILY PRN    warfarin (COUMADIN) tablet 10 mg  10 mg Oral QHS    enoxaparin (LOVENOX) injection 40 mg  40 mg SubCUTAneous Q24H    nebivolol (BYSTOLIC) tablet 5 mg  5 mg Oral DAILY    predniSONE (DELTASONE) tablet 20 mg  20 mg Oral DAILY WITH BREAKFAST    [START ON 8/11/2017] testosterone cypionate (DEPOTESTOTERONE CYPIONATE) injection 200 mg (patient supplied)  200 mg IntraMUSCular Q 14 DAYS    traZODone (DESYREL) tablet 25-50 mg  25-50 mg Oral QHS PRN    sodium chloride (NS) flush 5-10 mL  5-10 mL IntraVENous Q8H    sodium chloride (NS) flush 5-10 mL  5-10 mL IntraVENous PRN    famotidine (PEPCID) tablet 20 mg  20 mg Oral Q12H    cyclobenzaprine (FLEXERIL) tablet 10 mg  10 mg Oral TID PRN    HYDROcodone-acetaminophen (NORCO)  mg tablet 1 Tab  1 Tab Oral Q4H PRN    HYDROmorphone (PF) (DILAUDID) injection 1 mg  1 mg IntraVENous Q4H PRN    ondansetron (ZOFRAN) injection 4 mg  4 mg IntraVENous Q4H PRN    diphenhydrAMINE (BENADRYL) capsule 25 mg  25 mg Oral Q4H PRN       Physical Exam:  Definitely has more DF on the left with strong PF. RLE with DF 2-, EHL trace  prox 3- ?due more to pain  Sensation intact    Incision(s)/Wound(s):    Wound Spine Mid;Lower (Active)   DRESSING STATUS Clean, dry, and intact 8/9/2017 11:51 PM   DRESSING TYPE 4 x 4 8/9/2017  8:09 PM   Drainage Amount  None 8/9/2017 11:51 PM   Drainage Color Serosanguinous 8/9/2017  8:09 PM   Wound Odor None 8/9/2017 11:51 PM   Periwound Skin Condition Intact 8/9/2017 11:51 PM   Number of days:2       [REMOVED] Wound Shoulder Right (Removed)   Removed 08/09/17 0043   Number of [id number]          Functional Assessment:  Gross Assessment  AROM: Generally decreased, functional (08/09/17 0925)  Strength: Generally decreased, functional (except ankle dorsiflexors R 1/5, L 2+/5) (08/09/17 0925)  Sensation: Impaired (B LE's) (08/09/17 0925)     Gait  Speed/Lillie: Slow (08/09/17 1455)  Step Length: Right shortened;Left shortened (08/09/17 1455)  Swing Pattern: Right symmetrical (08/09/17 1455)  Gait Abnormalities: Shuffling gait (08/09/17 1455)  Ambulation - Level of Assistance: Minimal assistance (08/09/17 1455)  Distance (ft): 15 Feet (ft) (08/09/17 1455)  Assistive Device: Gait belt;Orthotic device; Walker, rolling (08/09/17 1455)     Bed Mobility  Rolling: Moderate assistance (08/09/17 0925)  Supine to Sit: Minimum assistance; Moderate assistance (08/09/17 0925)  Sit to Supine: Moderate assistance (08/09/17 1455)  Scooting: Total assistance (08/09/17 1455)     Balance  Sitting: Impaired (08/09/17 0925)  Sitting - Static: Good (unsupported) (08/09/17 0925)  Sitting - Dynamic: Prop sitting (08/09/17 0925)  Standing: Impaired (08/09/17 0925)  Standing - Static: Constant support (08/09/17 0925)  Standing - Dynamic : Poor (08/09/17 0925)                       Bed/Mat Mobility  Rolling: Moderate assistance (08/09/17 0925)  Supine to Sit: Minimum assistance; Moderate assistance (08/09/17 0925)  Sit to Supine: Moderate assistance (08/09/17 1455)  Sit to Stand: Moderate assistance (from low chair) (08/09/17 1455)  Bed to Chair: Minimum assistance (08/09/17 0925)  Scooting:  Total assistance (08/09/17 1455)     Labs/Studies:  Recent Results (from the past 72 hour(s))   TYPE & SCREEN    Collection Time: 08/08/17 12:35 PM   Result Value Ref Range    Crossmatch Expiration 08/11/2017     ABO/Rh(D) A POSITIVE     Antibody screen NEG    POC INR    Collection Time: 08/08/17 12:35 PM   Result Value Ref Range    PT/INR (POC) comment RESULTS SCANNED IN [facility] CARE     PROTHROMBIN TIME + INR    Collection Time: 08/08/17 10:50 PM   Result Value Ref Range    Prothrombin time 10.6 9.6 - 12.0 sec    INR 1.0 0.9 - 1.2     METABOLIC PANEL, BASIC    Collection Time: 08/09/17  5:29 AM   Result Value Ref Range    Sodium 139 136 - 145 mmol/L    Potassium 5.3 (H) 3.5 - 5.1 mmol/L    Chloride 99 98 - 107 mmol/L    CO2 28 21 - 32 mmol/L    Anion gap 12 7 - 16 mmol/L    Glucose 126 (H) 65 - 100 mg/dL    BUN 26 (H) 6 - 23 MG/DL    Creatinine 1.45 0.8 - 1.5 MG/DL    GFR est AA >60 >60 ml/min/1.73m2    GFR est non-AA 53 (L) >60 ml/min/1.73m2    Calcium 8.3 8.3 - 10.4 MG/DL   CBC W/O DIFF Collection Time: 08/09/17  5:29 AM   Result Value Ref Range    WBC 13.3 (H) 4.3 - 11.1 K/uL    RBC 4.09 (L) 4.23 - 5.67 M/uL    HGB 14.3 13.6 - 17.2 g/dL    HCT 43.0 41.1 - 50.3 %    .1 (H) 79.6 - 97.8 FL    MCH 35.0 (H) 26.1 - 32.9 PG    MCHC 33.3 31.4 - 35.0 g/dL    RDW 15.4 (H) 11.9 - 14.6 %    PLATELET 296 (L) 802 - 450 K/uL    MPV 9.5 (L) 10.8 - 14.1 FL   CBC W/O DIFF    Collection Time: 08/10/17  3:31 AM   Result Value Ref Range    WBC 8.8 4.3 - 11.1 K/uL    RBC 3.66 (L) 4.23 - 5.67 M/uL    HGB 12.7 (L) 13.6 - 17.2 g/dL    HCT 38.0 (L) 41.1 - 50.3 %    .8 (H) 79.6 - 97.8 FL    MCH 34.7 (H) 26.1 - 32.9 PG    MCHC 33.4 31.4 - 35.0 g/dL    RDW 15.1 (H) 11.9 - 14.6 %    PLATELET 98 (L) 176 - 450 K/uL    MPV 9.0 (L) 10.8 - 14.1 FL        Assessment:     Principal Problem:    Spinal stenosis of lumbar region (8/8/2017)    Active Problems:    Spondylolisthesis of lumbar region (8/8/2017)      Spinal stenosis (8/8/2017)        Plan:     Recommendations: Continue Acute Rehab Program  Coordination of rehab/medical care  Counseling of PM & R care issues management  Monitoring and management of medical conditions per plan of care/orders  Discussion with Family/Caregiver/Staff  Reviewed Therapies/Labs/Medications/Records    Signed By:  Idania De Jesus MD     August 10, 2017

## 2017-08-10 NOTE — PROGRESS NOTES
Problem: Mobility Impaired (Adult and Pediatric)  Goal: *Acute Goals and Plan of Care (Insert Text)  LongTerm Goals:  1. Mr. Umberto Wasserman will move from supine to sit and sit to supine in flat bed via log roll with CONTACT GUARD ASSIST within 7 day(s). 2. Mr. Umberto Wasserman will transfer from sit to stand and stand to sit with CONTACT GUARD ASSIST within 7 days. 3. Mr. Umberto Wasserman will ambulate with CONTACT GUARD ASSIST for 100+ feet with the least restrictive device within 7 day(s). 4. Mr. Umberto Wasserman will verbalize 3/3 spinal precautions to ensure back safety during functional activities within 7 days. ________________________________________________________________________      PHYSICAL THERAPY: Daily Note, Treatment Day: 1st and AM 8/10/2017  INPATIENT: Hospital Day: 3  Payor: Rebecca Bennett / Plan: [facility] [employer] [facility] / Product Type: PPO /    Brace on when OOB  NAME/AGE/GENDER: Maritza Cabello is a 63 y.o. male       PRIMARY DIAGNOSIS: Lumbar spinal stenosis [M48.06]  Spondylolisthesis, unspecified spinal region [M43.10]  Foot drop, bilateral [M21.371, M21.372] Spinal stenosis of lumbar region Spinal stenosis of lumbar region  Procedure(s) (LRB):  L3-L5 LAMI FUSION TLIF (N/A)  2 Days Post-Op  ICD-10: Treatment Diagnosis:       · Difficulty in walking, Not elsewhere classified (R26.2)  · Other abnormalities of gait and mobility (R26.89)  · Low Back Pain (M54.5)   Precaution/Allergies:  Adhesive       ASSESSMENT:      Mr. Umberto Wasserman presents lying supine in bed agreeable to treatment. He needed min assist to get to edge of bed. He needs additional time with all mobility. He stood from bed with min assist to walker. He increased his ambulation distance with slow, shuffled steps. He became fatigued and pain increased with ambulation. He would benefit from an AFO. He stated he does have some that has been made for him. He just needs to pick them up from the orthotist. He did well with mobility this treatment. Will continue PT efforts. This section established at most recent assessment   PROBLEM LIST (Impairments causing functional limitations):  1. Decreased Strength  2. Decreased ADL/Functional Activities  3. Decreased Transfer Abilities  4. Decreased Ambulation Ability/Technique  5. Decreased Balance  6. Increased Pain  7. Decreased Activity Tolerance  8. Decreased Knowledge of Precautions  9. Decreased [place] with Home Exercise Program    INTERVENTIONS PLANNED: (Benefits and precautions of physical therapy have been discussed with the patient.)  1. Balance Exercise  2. Bed Mobility  3. Gait Training  4. Therapeutic Activites  5. Therapeutic Exercise/Strengthening  6. Transfer Training  7. education  8. Group Therapy      TREATMENT PLAN: Frequency/Duration: twice daily for duration of hospital stay  Rehabilitation Potential For Stated Goals: EXCELLENT      RECOMMENDED REHABILITATION/EQUIPMENT: (at time of discharge pending progress): Continue Skilled Therapy and Rehab. HISTORY:   History of Present Injury/Illness (Reason for Referral):  Per MD note, \"This is a very pleasant 63year old patient who presents with a 2 week history of acute onset of bilateral leg pain and foot drop after a fall last week. He was on vacation and suffered to back-to-back falls. He then noticed that he had difficulty walking after these falls. He had tingling that started in his leg and now he cannot lift either foot because of acute foot drop. He was referred for further evaluation of his lumbar spine by Dr. Joy Lala. MRI of the lumbar spine reveals severe spinal stenosis at L3-4 with lateral recess stenosis as well. At L4-5 there is a grade 1 anterolisthesis severe facet arthropathy and mild central and some lateral recess stenosis primarily on the left. There is degenerative changes and moderate left L5 lateral recess and foraminal stenosis at L5-S1. Patient is in a wheelchair today. He has difficulty walking any distance.   He has pain in the back and radiates down each leg. He rates his pain a 10/10. He denies any change in bowel or bladder function. Diclofenac, Norco.  Patient takes daily Coumadin due to a history of 3 DVTs and a pulmonary embolism. He underwent a lobectomy due to aspirate was infection and his previous pulmonary embolism. He has come off of his Coumadin the past for surgery but has to be on a Lovenox bridge. His Coumadin as prescribed by Dr. Jaylon Alejandre, his primary care physician. He is currently completing a prednisone pack. \"  Past Medical History/Comorbidities:   Mr. Stephon Tripp  has a past medical history of Arthritis; Cellulitis and abscess of trunk (December, 2009); Chronic pain; Depression; Ex-smoker for more than 1 year; Hypertension; Insomnia; PE (pulmonary thromboembolism) ([address] (2007); Renal failure (12/10/2009); S/P lobectomy of lung; and Thromboembolus ([facility] [address] (2009, 2011). He also has no past medical history of Coagulation defects; COPD; Other ill-defined conditions; or Unspecified adverse effect of anesthesia. Mr. Stephon Tripp  has a past surgical history that includes hip replacement (2007); thoracotomy (December, 14, 2009); lobectomy (12/01/2009); chest surgery procedure unlisted; vascular surgery procedure unlist (2009); and gi (April, 2008). Social History/Living Environment:   Home Environment: Trailer/mobile home  # Steps to Enter: 2  One/Two Story Residence: One story  Living Alone: Yes  Support Systems: Friends \ neighbors, Family member(s), Spouse/Significant Other/Partner  Patient Expects to be Discharged to[de-identified] Trailer/mobile home  Current DME Used/Available at Home: Walker  Prior Level of Function/Work/Activity:  Lives alone, works, drives. Patient has been ambulating with RW recently due to hip pain and needing a EJ.       Number of Personal Factors/Comorbidities that affect the Plan of Care: 3+: HIGH COMPLEXITY   EXAMINATION:   Most Recent Physical Functioning:   Gross Assessment:                  Posture:  Posture (WDL): Exceptions to WDL  Posture Assessment: Forward head  Balance:  Sitting: Impaired  Sitting - Static: Good (unsupported)  Sitting - Dynamic: Fair (occasional)  Standing: Impaired  Standing - Static: Fair  Standing - Dynamic : Fair Bed Mobility:  Rolling: Moderate assistance  Supine to Sit: Moderate assistance;Minimum assistance  Scooting: Moderate assistance  Wheelchair Mobility:     Transfers:  Sit to Stand: Minimum assistance  Stand to Sit: Minimum assistance  Bed to Chair: Minimum assistance  Gait:     Speed/Lillie: Slow;Shuffled  Step Length: Left shortened;Right shortened  Gait Abnormalities: Shuffling gait  Distance (ft): 20 Feet (ft)  Assistive Device: Walker, rolling (back brace)  Ambulation - Level of Assistance: Minimal assistance       Body Structures Involved:  1. Nerves  2. Bones  3. Joints  4. Muscles  5. Ligaments Body Functions Affected:  1. Sensory/Pain  2. Neuromusculoskeletal  3. Movement Related  4. Skin Related Activities and Participation Affected:  1. Mobility  2. Self Care  3. Domestic Life   Number of elements that affect the Plan of Care: 4+: HIGH COMPLEXITY   CLINICAL PRESENTATION:   Presentation: Evolving clinical presentation with changing clinical characteristics: MODERATE COMPLEXITY   CLINICAL DECISION MAKIN [facility] Short Form  How much difficulty does the patient currently have. .. Unable A Lot A Little None   1. Turning over in bed (including adjusting bedclothes, sheets and blankets)? [ ] 1   [X] 2   [ ] 3   [ ] 4   2. Sitting down on and standing up from a chair with arms ( e.g., wheelchair, bedside commode, etc.)   [ ] 1   [ ] 2   [X] 3   [ ] 4   3. Moving from lying on back to sitting on the side of the bed? [ ] 1   [X] 2   [ ] 3   [ ] 4   How much help from another person does the patient currently need. .. Total A Lot A Little None   4. Moving to and from a bed to a chair (including a wheelchair)?    [ ] 1 [ ] 2   [X] 3   [ ] 4   5. Need to walk in hospital room? [ ] 1   [ ] 2   [X] 3   [ ] 4   6. Climbing 3-5 steps with a railing? [ ] 1   [X] 2   [ ] 3   [ ] 4   © 2007, Trustees of [address] [place] [address] [address] [address], under license to [employer]. All rights reserved    Score:  Initial: 15 Most Recent: X (Date: -- )     Interpretation of Tool:  Represents activities that are increasingly more difficult (i.e. Bed mobility, Transfers, Gait). Score 24 23 22-20 19-15 14-10 9-7 6       Modifier CH CI CJ CK CL CM CN         · Mobility - Walking and Moving Around:               - CURRENT STATUS:    CK - 40%-59% impaired, limited or restricted               - GOAL STATUS:           CJ - 20%-39% impaired, limited or restricted               - D/C STATUS:                       ---------------To be determined---------------  Payor: BLUE CROSS / Plan: [place] BLUE CROSS [facility] / Product Type: PPO /       Medical Necessity:     · Patient is expected to demonstrate progress in strength, range of motion, balance, coordination and functional technique to increase independence with   and improve safety during all functional mobility. Reason for Services/Other Comments:  · Patient continues to require skilled intervention due to medical complications and decline in functional mobiltiy. Use of outcome tool(s) and clinical judgement create a POC that gives a: Questionable prediction of patient's progress: MODERATE COMPLEXITY                 TREATMENT:   (In addition to Assessment/Re-Assessment sessions the following treatments were rendered)   Pre-treatment Symptoms/Complaints:    Pain: Initial:   Pain Intensity 1:  (pain with all movements not rated)  Post Session:  Pain noted at end of treatment - not rated though. Therapeutic Activity: (    23 minutes):   Therapeutic activities including Bed transfers, Chair transfers, Ambulation on level ground and instruction in spine precautions  to improve mobility, strength and balance. Required minimal cueing   to promote correct body mechanics with all mobility. Braces/Orthotics/Lines/Etc:   · back brace on while up  Treatment/Session Assessment:    · Response to Treatment:  See above. · Interdisciplinary Collaboration:  · Physical Therapy Assistant and Registered Nurse  · After treatment position/precautions:  · Up in chair, Bed/Chair-wheels locked, Call light within reach and RN notified  · Compliance with Program/Exercises: compliant all of the time. · Recommendations/Intent for next treatment session: \"Next visit will focus on advancements to more challenging activities and reduction in assistance provided\".   Total Treatment Duration:PT Patient Time In/Time Out  Time In: 1030  Time Out: [address], [place]

## 2017-08-10 NOTE — PROGRESS NOTES
Verbal & Bedside shift change report given to Augustine Tsai (oncoming nurse) by Jerri Castro (offgoing nurse). Report given with SBAR, Kardex, Intake/Output, MAR and Recent Results.

## 2017-08-10 NOTE — PROGRESS NOTES
Problem: Mobility Impaired (Adult and Pediatric)  Goal: *Acute Goals and Plan of Care (Insert Text)  LongTerm Goals:  1. Mr. Lena Cruz will move from supine to sit and sit to supine in flat bed via log roll with CONTACT GUARD ASSIST within 7 day(s). 2. Mr. Lena Cruz will transfer from sit to stand and stand to sit with CONTACT GUARD ASSIST within 7 days. 3. Mr. Lena Cruz will ambulate with CONTACT GUARD ASSIST for 100+ feet with the least restrictive device within 7 day(s). 4. Mr. Lena Cruz will verbalize 3/3 spinal precautions to ensure back safety during functional activities within 7 days. ________________________________________________________________________      PHYSICAL THERAPY: Daily Note, Treatment Day: 1st and PM 8/10/2017  INPATIENT: Hospital Day: 3  Payor: BLUE CROSS / Plan: [place]OSS [address] / Product Type: PPO /    Brace on when OOB  NAME/AGE/GENDER: Refugio Perry is a 63 y.o. male       PRIMARY DIAGNOSIS: Lumbar spinal stenosis [M48.06]  Spondylolisthesis, unspecified spinal region [M43.10]  Foot drop, bilateral [M21.371, M21.372] Spinal stenosis of lumbar region Spinal stenosis of lumbar region  Procedure(s) (LRB):  L3-L5 LAMI FUSION TLIF (N/A)  2 Days Post-Op  ICD-10: Treatment Diagnosis:       · Difficulty in walking, Not elsewhere classified (R26.2)  · Other abnormalities of gait and mobility (R26.89)  · Low Back Pain (M54.5)   Precaution/Allergies:  Adhesive       ASSESSMENT:      Mr. Lena Cruz presents lying supine in bed agreeable to treatment. He needed min assist to get to edge of bed. He needs additional time with all mobility. He stood from bed with min assist to walker. He increased his ambulation distance with slow, shuffled steps. He became fatigued and pain increased with ambulation. He would benefit from an AFO. He stated he does have some that has been made for him. He just needs to pick them up from the orthotist. He did well with mobility this treatment. Will continue PT efforts. PM NOTE: Patient presents sitting up in chair agreeable to get back to bed. He stood from low chair with min assist x 2 and then was able to take a few steps over to bed with min assist and verbal cues for safety. He then stood with CGA for PCT to help clean patient up. He became fatigued with minimal activity and needed to sit down. He then returned to supine with minimal assistance. Fair progress noted with activity tolerance. Will continue PT efforts. This section established at most recent assessment   PROBLEM LIST (Impairments causing functional limitations):  1. Decreased Strength  2. Decreased ADL/Functional Activities  3. Decreased Transfer Abilities  4. Decreased Ambulation Ability/Technique  5. Decreased Balance  6. Increased Pain  7. Decreased Activity Tolerance  8. Decreased Knowledge of Precautions  9. Decreased [place] with Home Exercise Program    INTERVENTIONS PLANNED: (Benefits and precautions of physical therapy have been discussed with the patient.)  1. Balance Exercise  2. Bed Mobility  3. Gait Training  4. Therapeutic Activites  5. Therapeutic Exercise/Strengthening  6. Transfer Training  7. education  8. Group Therapy      TREATMENT PLAN: Frequency/Duration: twice daily for duration of hospital stay  Rehabilitation Potential For Stated Goals: EXCELLENT      RECOMMENDED REHABILITATION/EQUIPMENT: (at time of discharge pending progress): Continue Skilled Therapy and Rehab. HISTORY:   History of Present Injury/Illness (Reason for Referral):  Per MD note, \"This is a very pleasant 63year old patient who presents with a 2 week history of acute onset of bilateral leg pain and foot drop after a fall last week. He was on vacation and suffered to back-to-back falls. He then noticed that he had difficulty walking after these falls. He had tingling that started in his leg and now he cannot lift either foot because of acute foot drop.   He was referred for further evaluation of his lumbar spine by Dr. Jessy Wills. MRI of the lumbar spine reveals severe spinal stenosis at L3-4 with lateral recess stenosis as well. At L4-5 there is a grade 1 anterolisthesis severe facet arthropathy and mild central and some lateral recess stenosis primarily on the left. There is degenerative changes and moderate left L5 lateral recess and foraminal stenosis at L5-S1. Patient is in a wheelchair today. He has difficulty walking any distance. He has pain in the back and radiates down each leg. He rates his pain a 10/10. He denies any change in bowel or bladder function. Diclofenac, Norco.  Patient takes daily Coumadin due to a history of 3 DVTs and a pulmonary embolism. He underwent a lobectomy due to aspirate was infection and his previous pulmonary embolism. He has come off of his Coumadin the past for surgery but has to be on a Lovenox bridge. His Coumadin as prescribed by Dr. Faisal Colón, his primary care physician. He is currently completing a prednisone pack. \"  Past Medical History/Comorbidities:   Mr. Tello eLmus  has a past medical history of Arthritis; Cellulitis and abscess of trunk (December, 2009); Chronic pain; Depression; Ex-smoker for more than 1 year; Hypertension; Insomnia; PE (pulmonary thromboembolism) ([facility] [address] (2007); Renal failure (12/10/2009); S/P lobectomy of lung; and Thromboembolus ([facility] [address] (2009, 2011). He also has no past medical history of Coagulation defects; COPD; Other ill-defined conditions; or Unspecified adverse effect of anesthesia. Mr. Tello Lemus  has a past surgical history that includes hip replacement (2007); thoracotomy (December, 14, 2009); lobectomy (12/01/2009); chest surgery procedure unlisted; vascular surgery procedure unlist (2009); and gi (April, 2008).   Social History/Living Environment:   Home Environment: Trailer/mobile home  # Steps to Enter: 2  One/Two Story Residence: One story  Living Alone: Yes  Support Systems: Friends \ neighbors, Family member(s), Spouse/Significant Other/Partner  Patient Expects to be Discharged to[de-identified] Trailer/mobile home  Current DME Used/Available at Home: Walker  Prior Level of Function/Work/Activity:  Lives alone, works, drives. Patient has been ambulating with RW recently due to hip pain and needing a EJ. Number of Personal Factors/Comorbidities that affect the Plan of Care: 3+: HIGH COMPLEXITY   EXAMINATION:   Most Recent Physical Functioning:   Gross Assessment:                  Posture:  Posture (WDL): Exceptions to WDL  Posture Assessment: Forward head  Balance:  Sitting: Impaired  Sitting - Static: Good (unsupported)  Sitting - Dynamic: Fair (occasional)  Standing: Impaired  Standing - Static: Fair  Standing - Dynamic : Fair Bed Mobility:  Rolling: Minimum assistance  Supine to Sit: Moderate assistance;Minimum assistance  Sit to Supine: Minimum assistance  Scooting: Minimum assistance  Wheelchair Mobility:     Transfers:  Sit to Stand: Minimum assistance;Assist x2 (from low chair)  Stand to Sit: Minimum assistance  Bed to Chair: Minimum assistance  Gait:     Speed/Lillie: Slow;Shuffled  Step Length: Left shortened;Right shortened  Gait Abnormalities: Shuffling gait  Distance (ft): 10 Feet (ft)  Assistive Device: Walker, rolling (back brace)  Ambulation - Level of Assistance: Minimal assistance       Body Structures Involved:  1. Nerves  2. Bones  3. Joints  4. Muscles  5. Ligaments Body Functions Affected:  1. Sensory/Pain  2. Neuromusculoskeletal  3. Movement Related  4. Skin Related Activities and Participation Affected:  1. Mobility  2. Self Care  3. Domestic Life   Number of elements that affect the Plan of Care: 4+: HIGH COMPLEXITY   CLINICAL PRESENTATION:   Presentation: Evolving clinical presentation with changing clinical characteristics: MODERATE COMPLEXITY   CLINICAL DECISION MAKIN [facility] Inpatient Short Form  How much difficulty does the patient currently have. .. Unable A Lot A Little None   1. Turning over in bed (including adjusting bedclothes, sheets and blankets)? [ ] 1   [X] 2   [ ] 3   [ ] 4   2. Sitting down on and standing up from a chair with arms ( e.g., wheelchair, bedside commode, etc.)   [ ] 1   [ ] 2   [X] 3   [ ] 4   3. Moving from lying on back to sitting on the side of the bed? [ ] 1   [X] 2   [ ] 3   [ ] 4   How much help from another person does the patient currently need. .. Total A Lot A Little None   4. Moving to and from a bed to a chair (including a wheelchair)? [ ] 1   [ ] 2   [X] 3   [ ] 4   5. Need to walk in hospital room? [ ] 1   [ ] 2   [X] 3   [ ] 4   6. Climbing 3-5 steps with a railing? [ ] 1   [X] 2   [ ] 3   [ ] 4   © 2007, Trustees of [address] [place], [place] [id number], under license to [employer]. All rights reserved    Score:  Initial: 15 Most Recent: X (Date: -- )     Interpretation of Tool:  Represents activities that are increasingly more difficult (i.e. Bed mobility, Transfers, Gait). Score 24 23 22-20 19-15 14-10 9-7 6       Modifier CH CI CJ CK CL CM CN         · Mobility - Walking and Moving Around:               - CURRENT STATUS:    CK - 40%-59% impaired, limited or restricted               - GOAL STATUS:           CJ - 20%-39% impaired, limited or restricted               - D/C STATUS:                       ---------------To be determined---------------  Payor: BLUE CROSS / Plan: [facility] / Product Type: PPO /       Medical Necessity:     · Patient is expected to demonstrate progress in strength, range of motion, balance, coordination and functional technique to increase independence with   and improve safety during all functional mobility. Reason for Services/Other Comments:  · Patient continues to require skilled intervention due to medical complications and decline in functional mobiltiy.    Use of outcome tool(s) and clinical judgement create a POC that gives a: Questionable prediction of patient's progress: MODERATE COMPLEXITY                 TREATMENT:   (In addition to Assessment/Re-Assessment sessions the following treatments were rendered)   Pre-treatment Symptoms/Complaints:  \"I am tired.'  Pain: Initial:   Pain Intensity 1:  (pain with movement but not rated)  Post Session:  Pain noted at end of treatment - not rated though. Therapeutic Activity: (    15 minutes): Therapeutic activities including Bed transfers, Chair transfers, Ambulation on level ground and instruction in spine precautions  to improve mobility, strength and balance. Required minimal cueing   to promote correct body mechanics with all mobility. Braces/Orthotics/Lines/Etc:   · back brace on while up  Treatment/Session Assessment:    · Response to Treatment:  See above. · Interdisciplinary Collaboration:  · Physical Therapy Assistant and Registered Nurse  · After treatment position/precautions:  · Up in chair, Bed/Chair-wheels locked, Call light within reach and RN notified  · Compliance with Program/Exercises: compliant all of the time. · Recommendations/Intent for next treatment session: \"Next visit will focus on advancements to more challenging activities and reduction in assistance provided\".   Total Treatment Duration:PT Patient Time In/Time Out  Time In: 5154  Time Out: [address] [facility], [address], [facility]

## 2017-08-10 NOTE — PROGRESS NOTES
Verbal & Bedside shift change report given to Lani Buerger (oncoming nurse) by Carlos Loja (offgoing nurse). Report given with SBAR, Kardex, Intake/Output, MAR and Recent Results.

## 2017-08-11 LAB
ERYTHROCYTE [DISTWIDTH] IN BLOOD BY AUTOMATED COUNT: 15 % (ref 11.9–14.6)
HCT VFR BLD AUTO: 35 % (ref 41.1–50.3)
HGB BLD-MCNC: 11.8 G/DL (ref 13.6–17.2)
INR PPP: 1.4 (ref 0.9–1.2)
MCH RBC QN AUTO: 34.7 PG (ref 26.1–32.9)
MCHC RBC AUTO-ENTMCNC: 33.7 G/DL (ref 31.4–35)
MCV RBC AUTO: 102.9 FL (ref 79.6–97.8)
PLATELET # BLD AUTO: 89 K/UL (ref 150–450)
PMV BLD AUTO: 9.3 FL (ref 10.8–14.1)
PROTHROMBIN TIME: 14.9 SEC (ref 9.6–12)
RBC # BLD AUTO: 3.4 M/UL (ref 4.23–5.67)
WBC # BLD AUTO: 7.6 K/UL (ref 4.3–11.1)

## 2017-08-11 PROCEDURE — 74011250636 HC RX REV CODE- 250/636: Performed by: ORTHOPAEDIC SURGERY

## 2017-08-11 PROCEDURE — 85027 COMPLETE CBC AUTOMATED: CPT | Performed by: ORTHOPAEDIC SURGERY

## 2017-08-11 PROCEDURE — 74011636637 HC RX REV CODE- 636/637: Performed by: ORTHOPAEDIC SURGERY

## 2017-08-11 PROCEDURE — 97166 OT EVAL MOD COMPLEX 45 MIN: CPT

## 2017-08-11 PROCEDURE — 97110 THERAPEUTIC EXERCISES: CPT

## 2017-08-11 PROCEDURE — 65270000029 HC RM PRIVATE

## 2017-08-11 PROCEDURE — 74011250637 HC RX REV CODE- 250/637: Performed by: ORTHOPAEDIC SURGERY

## 2017-08-11 PROCEDURE — 36415 COLL VENOUS BLD VENIPUNCTURE: CPT | Performed by: ORTHOPAEDIC SURGERY

## 2017-08-11 PROCEDURE — 85610 PROTHROMBIN TIME: CPT | Performed by: NURSE PRACTITIONER

## 2017-08-11 PROCEDURE — 97530 THERAPEUTIC ACTIVITIES: CPT

## 2017-08-11 RX ADMIN — TRAZODONE HYDROCHLORIDE 50 MG: 50 TABLET ORAL at 22:04

## 2017-08-11 RX ADMIN — HYDROMORPHONE HYDROCHLORIDE 1 MG: 1 INJECTION, SOLUTION INTRAMUSCULAR; INTRAVENOUS; SUBCUTANEOUS at 16:20

## 2017-08-11 RX ADMIN — BISACODYL 10 MG: 5 TABLET, COATED ORAL at 22:04

## 2017-08-11 RX ADMIN — Medication 10 ML: at 05:30

## 2017-08-11 RX ADMIN — FAMOTIDINE 20 MG: 20 TABLET ORAL at 08:45

## 2017-08-11 RX ADMIN — NEBIVOLOL HYDROCHLORIDE 5 MG: 5 TABLET ORAL at 08:47

## 2017-08-11 RX ADMIN — Medication 10 ML: at 16:22

## 2017-08-11 RX ADMIN — Medication 10 ML: at 22:06

## 2017-08-11 RX ADMIN — HYDROCODONE BITARTRATE AND ACETAMINOPHEN 1 TABLET: 10; 325 TABLET ORAL at 08:47

## 2017-08-11 RX ADMIN — PREDNISONE 20 MG: 20 TABLET ORAL at 08:47

## 2017-08-11 RX ADMIN — HYDROMORPHONE HYDROCHLORIDE 1 MG: 1 INJECTION, SOLUTION INTRAMUSCULAR; INTRAVENOUS; SUBCUTANEOUS at 10:27

## 2017-08-11 RX ADMIN — STANDARDIZED SENNA CONCENTRATE AND DOCUSATE SODIUM 2 TABLET: 8.6; 5 TABLET, FILM COATED ORAL at 22:04

## 2017-08-11 RX ADMIN — HYDROMORPHONE HYDROCHLORIDE 1 MG: 1 INJECTION, SOLUTION INTRAMUSCULAR; INTRAVENOUS; SUBCUTANEOUS at 02:31

## 2017-08-11 RX ADMIN — HYDROCODONE BITARTRATE AND ACETAMINOPHEN 1 TABLET: 10; 325 TABLET ORAL at 18:29

## 2017-08-11 RX ADMIN — FAMOTIDINE 20 MG: 20 TABLET ORAL at 22:04

## 2017-08-11 RX ADMIN — ENOXAPARIN SODIUM 40 MG: 40 INJECTION SUBCUTANEOUS at 08:43

## 2017-08-11 RX ADMIN — WARFARIN SODIUM 10 MG: 5 TABLET ORAL at 22:05

## 2017-08-11 RX ADMIN — STANDARDIZED SENNA CONCENTRATE AND DOCUSATE SODIUM 2 TABLET: 8.6; 5 TABLET, FILM COATED ORAL at 08:47

## 2017-08-11 RX ADMIN — HYDROCODONE BITARTRATE AND ACETAMINOPHEN 1 TABLET: 10; 325 TABLET ORAL at 22:05

## 2017-08-11 RX ADMIN — HYDROCODONE BITARTRATE AND ACETAMINOPHEN 1 TABLET: 10; 325 TABLET ORAL at 14:21

## 2017-08-11 RX ADMIN — Medication 10 ML: at 10:43

## 2017-08-11 NOTE — PROGRESS NOTES
Bedside and Verbal shift change report given to Trevin Roach (oncoming nurse) by Pat High RN (offgoing nurse). Report included the following information Kardex, Procedure Summary, Intake/Output, MAR and Recent Results.

## 2017-08-11 NOTE — PROGRESS NOTES
Bedside and Verbal shift change report given to April RN (oncoming nurse) by Tamir Rothman (offgoing nurse). Report included the following information Kardex, Intake/Output, MAR and Recent Results.

## 2017-08-11 NOTE — PROGRESS NOTES
Offered CHG bath to pt. The pt refused mul;tiple times stating his family would help him this evening. Primary RN aware.

## 2017-08-11 NOTE — PROGRESS NOTES
Bedside report received from Flora Whalen, [facility]. PM assessment complete. Denies any needs at this time.

## 2017-08-11 NOTE — PROGRESS NOTES
Problem: Mobility Impaired (Adult and Pediatric)  Goal: *Acute Goals and Plan of Care (Insert Text)  LongTerm Goals:  1. Mr. Deedee Hernandez will move from supine to sit and sit to supine in flat bed via log roll with CONTACT GUARD ASSIST within 7 day(s). 2. Mr. Deedee Hernandez will transfer from sit to stand and stand to sit with CONTACT GUARD ASSIST within 7 days. 3. Mr. Deedee Hernandez will ambulate with CONTACT GUARD ASSIST for 100+ feet with the least restrictive device within 7 day(s). 4. Mr. Deedee Hernandez will verbalize 3/3 spinal precautions to ensure back safety during functional activities within 7 days. ________________________________________________________________________      PHYSICAL THERAPY: Daily Note, Treatment Day: 2nd and PM 8/11/2017  INPATIENT: Hospital Day: 4  Payor: Kenneth Vee / Plan: [place] [employer] / Product Type: PPO /    Brace on when OOB  NAME/AGE/GENDER: Buzz Mcallister is a 63 y.o. male       PRIMARY DIAGNOSIS: Lumbar spinal stenosis [M48.06]  Spondylolisthesis, unspecified spinal region [M43.10]  Foot drop, bilateral [M21.371, M21.372] Spinal stenosis of lumbar region Spinal stenosis of lumbar region  Procedure(s) (LRB):  L3-L5 LAMI FUSION TLIF (N/A)  3 Days Post-Op  ICD-10: Treatment Diagnosis:       · Difficulty in walking, Not elsewhere classified (R26.2)  · Other abnormalities of gait and mobility (R26.89)  · Low Back Pain (M54.5)   Precaution/Allergies:  Adhesive       ASSESSMENT:      Mr. Deedee Hernandez presents sitting up in chair agreeable to treatment. He stood with min assist and ambulated for and increased distance today. He was taking better steps this treatment. He returned to chair and was instructed in seated exercises. He demonstrated good technique with exercises. Good progress noted with mobility this treatment. Will continue PT efforts. PM NOTE: Patient presents sitting up in chair. He is agreeable to treatment.  He stood with min assist and ambulated out into hallway with verbal cues for safety and holding head up. He performed seated exercises with verbal cues and good technique. Good progress noted with activity tolerance. Will continue PT efforts. This section established at most recent assessment   PROBLEM LIST (Impairments causing functional limitations):  1. Decreased Strength  2. Decreased ADL/Functional Activities  3. Decreased Transfer Abilities  4. Decreased Ambulation Ability/Technique  5. Decreased Balance  6. Increased Pain  7. Decreased Activity Tolerance  8. Decreased Knowledge of Precautions  9. Decreased [place] with Home Exercise Program    INTERVENTIONS PLANNED: (Benefits and precautions of physical therapy have been discussed with the patient.)  1. Balance Exercise  2. Bed Mobility  3. Gait Training  4. Therapeutic Activites  5. Therapeutic Exercise/Strengthening  6. Transfer Training  7. education  8. Group Therapy      TREATMENT PLAN: Frequency/Duration: twice daily for duration of hospital stay  Rehabilitation Potential For Stated Goals: EXCELLENT      RECOMMENDED REHABILITATION/EQUIPMENT: (at time of discharge pending progress): Continue Skilled Therapy and Rehab. HISTORY:   History of Present Injury/Illness (Reason for Referral):  Per MD note, \"This is a very pleasant 63year old patient who presents with a 2 week history of acute onset of bilateral leg pain and foot drop after a fall last week. He was on vacation and suffered to back-to-back falls. He then noticed that he had difficulty walking after these falls. He had tingling that started in his leg and now he cannot lift either foot because of acute foot drop. He was referred for further evaluation of his lumbar spine by Dr. Oz Damian. MRI of the lumbar spine reveals severe spinal stenosis at L3-4 with lateral recess stenosis as well. At L4-5 there is a grade 1 anterolisthesis severe facet arthropathy and mild central and some lateral recess stenosis primarily on the left.   There is degenerative changes and moderate left L5 lateral recess and foraminal stenosis at L5-S1. Patient is in a wheelchair today. He has difficulty walking any distance. He has pain in the back and radiates down each leg. He rates his pain a 10/10. He denies any change in bowel or bladder function. Diclofenac, Norco.  Patient takes daily Coumadin due to a history of 3 DVTs and a pulmonary embolism. He underwent a lobectomy due to aspirate was infection and his previous pulmonary embolism. He has come off of his Coumadin the past for surgery but has to be on a Lovenox bridge. His Coumadin as prescribed by Dr. Angela Pradhan, his primary care physician. He is currently completing a prednisone pack. \"  Past Medical History/Comorbidities:   Mr. Rama Winter  has a past medical history of Arthritis; Cellulitis and abscess of trunk (December, 2009); Chronic pain; Depression; Ex-smoker for more than 1 year; Hypertension; Insomnia; PE (pulmonary thromboembolism) ([address] (2007); Renal failure (12/10/2009); S/P lobectomy of lung; and Thromboembolus ([address] (2009, 2011). He also has no past medical history of Coagulation defects; COPD; Other ill-defined conditions; or Unspecified adverse effect of anesthesia. Mr. Rama Winter  has a past surgical history that includes hip replacement (2007); thoracotomy (December, 14, 2009); lobectomy (12/01/2009); chest surgery procedure unlisted; vascular surgery procedure unlist (2009); and gi (April, 2008). Social History/Living Environment:   Home Environment: Trailer/mobile home  # Steps to Enter: 2  Rails to [email or web address]: No  One/Two Story Residence: One story  Living Alone: Yes  Support Systems: Spouse/Significant Other/Partner, Friends \ neighbors, Family member(s), Child(bee)  Patient Expects to be Discharged to[de-identified] Rehabilitation facility  Current DME Used/Available at Home: Walker  Tub or Shower Type: Shower  Prior Level of Function/Work/Activity:  Lives alone, works, drives.   Patient has been ambulating with RW recently due to hip pain and needing a EJ. Number of Personal Factors/Comorbidities that affect the Plan of Care: 3+: HIGH COMPLEXITY   EXAMINATION:   Most Recent Physical Functioning:   Gross Assessment:                  Posture:  Posture (WDL): Exceptions to WDL  Posture Assessment: Forward head  Balance:  Sitting: Impaired  Sitting - Static: Good (unsupported)  Sitting - Dynamic: Fair (occasional)  Standing: Impaired  Standing - Static: Fair  Standing - Dynamic : Fair Bed Mobility:     Wheelchair Mobility:     Transfers:  Sit to Stand: Minimum assistance  Stand to Sit: Contact guard assistance  Gait:     Speed/Lillie: Slow;Shuffled  Step Length: Left shortened;Right shortened  Gait Abnormalities: Shuffling gait; Steppage gait  Distance (ft): 25 Feet (ft)  Assistive Device: Walker, rolling (Back pain)  Ambulation - Level of Assistance: Minimal assistance       Body Structures Involved:  1. Nerves  2. Bones  3. Joints  4. Muscles  5. Ligaments Body Functions Affected:  1. Sensory/Pain  2. Neuromusculoskeletal  3. Movement Related  4. Skin Related Activities and Participation Affected:  1. Mobility  2. Self Care  3. Domestic Life   Number of elements that affect the Plan of Care: 4+: HIGH COMPLEXITY   CLINICAL PRESENTATION:   Presentation: Evolving clinical presentation with changing clinical characteristics: MODERATE COMPLEXITY   CLINICAL DECISION MAKIN [facility] Inpatient Short Form  How much difficulty does the patient currently have. .. Unable A Lot A Little None   1. Turning over in bed (including adjusting bedclothes, sheets and blankets)? [ ] 1   [X] 2   [ ] 3   [ ] 4   2. Sitting down on and standing up from a chair with arms ( e.g., wheelchair, bedside commode, etc.)   [ ] 1   [ ] 2   [X] 3   [ ] 4   3. Moving from lying on back to sitting on the side of the bed?    [ ] 1   [X] 2   [ ] 3   [ ] 4   How much help from another person does the patient currently need. .. Total A Lot A Little None   4. Moving to and from a bed to a chair (including a wheelchair)? [ ] 1   [ ] 2   [X] 3   [ ] 4   5. Need to walk in hospital room? [ ] 1   [ ] 2   [X] 3   [ ] 4   6. Climbing 3-5 steps with a railing? [ ] 1   [X] 2   [ ] 3   [ ] 4   © 2007, Trustees of [address] [place] [address] [address] [address], under license to [employer]. All rights reserved    Score:  Initial: 15 Most Recent: X (Date: -- )     Interpretation of Tool:  Represents activities that are increasingly more difficult (i.e. Bed mobility, Transfers, Gait). Score 24 23 22-20 19-15 14-10 9-7 6       Modifier CH CI CJ CK CL CM CN         · Mobility - Walking and Moving Around:               - CURRENT STATUS:    CK - 40%-59% impaired, limited or restricted               - GOAL STATUS:           CJ - 20%-39% impaired, limited or restricted               - D/C STATUS:                       ---------------To be determined---------------  Payor: BLUE CROSS / Plan: [place] BLUE [employer] [facility] / Product Type: PPO /       Medical Necessity:     · Patient is expected to demonstrate progress in strength, range of motion, balance, coordination and functional technique to increase independence with   and improve safety during all functional mobility. Reason for Services/Other Comments:  · Patient continues to require skilled intervention due to medical complications and decline in functional mobiltiy. Use of outcome tool(s) and clinical judgement create a POC that gives a: Questionable prediction of patient's progress: MODERATE COMPLEXITY                 TREATMENT:   (In addition to Assessment/Re-Assessment sessions the following treatments were rendered)   Pre-treatment Symptoms/Complaints: \"Do I have to get up? \"  Pain: Initial:   Pain Intensity 1:  (some pain withmovement but not rated)  Post Session:  Pain noted at end of treatment - not rated though. Therapeutic Activity: (    15 minutes):   Therapeutic activities including Bed transfers, Chair transfers, Ambulation on level ground and instruction in spine precautions  to improve mobility, strength and balance. Required minimal cueing   to promote correct body mechanics with all mobility. Therapeutic Exercise: (10 Minutes):  Exercises per grid below to improve mobility, strength, balance, coordination and activity tolerance. Required minimal verbal cues to promote proper body alignment and promote proper body breathing techniques. Progressed repetitions and complexity of movement as indicated. Date:  8-11-17 AM Date:  8-11-17 PM Date:     Activity/Exercise Parameters Parameters Parameters   Seated heel raises/toe raises x15 x15    Seated LAQ x15 x15    Seated marching x15 x15    Seated hip abduction x15                         Braces/Orthotics/Lines/Etc:   · back brace on while up  Treatment/Session Assessment:    · Response to Treatment:  See above. · Interdisciplinary Collaboration:  · Physical Therapy Assistant and Registered Nurse  · After treatment position/precautions:  · Up in chair, Bed/Chair-wheels locked, Call light within reach and RN notified  · Compliance with Program/Exercises: compliant all of the time. · Recommendations/Intent for next treatment session: \"Next visit will focus on advancements to more challenging activities and reduction in assistance provided\".   Total Treatment Duration:PT Patient Time In/Time Out  Time In: 1350  Time Out: [address], PTA

## 2017-08-11 NOTE — PROGRESS NOTES
Problem: Mobility Impaired (Adult and Pediatric)  Goal: *Acute Goals and Plan of Care (Insert Text)  LongTerm Goals:  1. Mr. Ariella Ng will move from supine to sit and sit to supine in flat bed via log roll with CONTACT GUARD ASSIST within 7 day(s). 2. Mr. Ariella Ng will transfer from sit to stand and stand to sit with CONTACT GUARD ASSIST within 7 days. 3. Mr. Ariella Ng will ambulate with CONTACT GUARD ASSIST for 100+ feet with the least restrictive device within 7 day(s). 4. Mr. Ariella Ng will verbalize 3/3 spinal precautions to ensure back safety during functional activities within 7 days. ________________________________________________________________________      PHYSICAL THERAPY: Daily Note, Treatment Day: 2nd and AM 8/11/2017  INPATIENT: Hospital Day: 4  Payor: Adore Carreon / Plan: [place] [employer] [facility] / Product Type: PPO /    Brace on when OOB  NAME/AGE/GENDER: Krzysztof Zaman is a 63 y.o. male       PRIMARY DIAGNOSIS: Lumbar spinal stenosis [M48.06]  Spondylolisthesis, unspecified spinal region [M43.10]  Foot drop, bilateral [M21.371, M21.372] Spinal stenosis of lumbar region Spinal stenosis of lumbar region  Procedure(s) (LRB):  L3-L5 LAMI FUSION TLIF (N/A)  3 Days Post-Op  ICD-10: Treatment Diagnosis:       · Difficulty in walking, Not elsewhere classified (R26.2)  · Other abnormalities of gait and mobility (R26.89)  · Low Back Pain (M54.5)   Precaution/Allergies:  Adhesive       ASSESSMENT:      Mr. Ariella Ng presents sitting up in chair agreeable to treatment. He stood with min assist and ambulated for and increased distance today. He was taking better steps this treatment. He returned to chair and was instructed in seated exercises. He demonstrated good technique with exercises. Good progress noted with mobility this treatment. Will continue PT efforts. This section established at most recent assessment   PROBLEM LIST (Impairments causing functional limitations):  1. Decreased Strength  2.  Decreased ADL/Functional Activities  3. Decreased Transfer Abilities  4. Decreased Ambulation Ability/Technique  5. Decreased Balance  6. Increased Pain  7. Decreased Activity Tolerance  8. Decreased Knowledge of Precautions  9. Decreased [place] with Home Exercise Program    INTERVENTIONS PLANNED: (Benefits and precautions of physical therapy have been discussed with the patient.)  1. Balance Exercise  2. Bed Mobility  3. Gait Training  4. Therapeutic Activites  5. Therapeutic Exercise/Strengthening  6. Transfer Training  7. education  8. Group Therapy      TREATMENT PLAN: Frequency/Duration: twice daily for duration of hospital stay  Rehabilitation Potential For Stated Goals: EXCELLENT      RECOMMENDED REHABILITATION/EQUIPMENT: (at time of discharge pending progress): Continue Skilled Therapy and Rehab. HISTORY:   History of Present Injury/Illness (Reason for Referral):  Per MD note, \"This is a very pleasant 63year old patient who presents with a 2 week history of acute onset of bilateral leg pain and foot drop after a fall last week. He was on vacation and suffered to back-to-back falls. He then noticed that he had difficulty walking after these falls. He had tingling that started in his leg and now he cannot lift either foot because of acute foot drop. He was referred for further evaluation of his lumbar spine by Dr. Tash Sands. MRI of the lumbar spine reveals severe spinal stenosis at L3-4 with lateral recess stenosis as well. At L4-5 there is a grade 1 anterolisthesis severe facet arthropathy and mild central and some lateral recess stenosis primarily on the left. There is degenerative changes and moderate left L5 lateral recess and foraminal stenosis at L5-S1. Patient is in a wheelchair today. He has difficulty walking any distance. He has pain in the back and radiates down each leg. He rates his pain a 10/10. He denies any change in bowel or bladder function.   Diclofenac, Norco.  Patient takes daily Coumadin due to a history of 3 DVTs and a pulmonary embolism. He underwent a lobectomy due to aspirate was infection and his previous pulmonary embolism. He has come off of his Coumadin the past for surgery but has to be on a Lovenox bridge. His Coumadin as prescribed by Dr. Maya Johnson, his primary care physician. He is currently completing a prednisone pack. \"  Past Medical History/Comorbidities:   Mr. Amparo Bridges  has a past medical history of Arthritis; Cellulitis and abscess of trunk (December, 2009); Chronic pain; Depression; Ex-smoker for more than 1 year; Hypertension; Insomnia; PE (pulmonary thromboembolism) ([facility] (2007); Renal failure (12/10/2009); S/P lobectomy of lung; and Thromboembolus ([facility] (2009, 2011). He also has no past medical history of Coagulation defects; COPD; Other ill-defined conditions; or Unspecified adverse effect of anesthesia. Mr. Amparo Bridges  has a past surgical history that includes hip replacement (2007); thoracotomy (December, 14, 2009); lobectomy (12/01/2009); chest surgery procedure unlisted; vascular surgery procedure unlist (2009); and gi (April, 2008). Social History/Living Environment:   Home Environment: Trailer/mobile home  # Steps to Enter: 2  Rails to [employer]: No  One/Two Story Residence: One story  Living Alone: Yes  Support Systems: Spouse/Significant Other/Partner, Friends \ neighbors, Family member(s), Child(bee)  Patient Expects to be Discharged to[de-identified] Rehabilitation facility  Current DME Used/Available at Home: Walker  Tub or Shower Type: Shower  Prior Level of Function/Work/Activity:  Lives alone, works, drives. Patient has been ambulating with RW recently due to hip pain and needing a EJ. Number of Personal Factors/Comorbidities that affect the Plan of Care: 3+: HIGH COMPLEXITY   EXAMINATION:   Most Recent Physical Functioning:   Gross Assessment:                  Posture:  Posture (WDL): Exceptions to WDL  Posture Assessment:  Forward head  Balance:  Sitting: Impaired  Sitting - Static: Good (unsupported)  Sitting - Dynamic: Fair (occasional)  Standing: Impaired  Standing - Static: Fair  Standing - Dynamic : Fair Bed Mobility:     Wheelchair Mobility:     Transfers:  Sit to Stand: Minimum assistance  Stand to Sit: Minimum assistance  Gait:     Speed/Lillie: Slow;Shuffled  Step Length: Left shortened;Right shortened  Gait Abnormalities: Shuffling gait  Distance (ft): 25 Feet (ft)  Assistive Device: Walker, rolling (back brace)  Ambulation - Level of Assistance: Minimal assistance       Body Structures Involved:  1. Nerves  2. Bones  3. Joints  4. Muscles  5. Ligaments Body Functions Affected:  1. Sensory/Pain  2. Neuromusculoskeletal  3. Movement Related  4. Skin Related Activities and Participation Affected:  1. Mobility  2. Self Care  3. Domestic Life   Number of elements that affect the Plan of Care: 4+: HIGH COMPLEXITY   CLINICAL PRESENTATION:   Presentation: Evolving clinical presentation with changing clinical characteristics: MODERATE COMPLEXITY   CLINICAL DECISION MAKIN [facility] Inpatient Short Form  How much difficulty does the patient currently have. .. Unable A Lot A Little None   1. Turning over in bed (including adjusting bedclothes, sheets and blankets)? [ ] 1   [X] 2   [ ] 3   [ ] 4   2. Sitting down on and standing up from a chair with arms ( e.g., wheelchair, bedside commode, etc.)   [ ] 1   [ ] 2   [X] 3   [ ] 4   3. Moving from lying on back to sitting on the side of the bed? [ ] 1   [X] 2   [ ] 3   [ ] 4   How much help from another person does the patient currently need. .. Total A Lot A Little None   4. Moving to and from a bed to a chair (including a wheelchair)? [ ] 1   [ ] 2   [X] 3   [ ] 4   5. Need to walk in hospital room? [ ] 1   [ ] 2   [X] 3   [ ] 4   6. Climbing 3-5 steps with a railing?    [ ] 1   [X] 2   [ ] 3   [ ] 4   © , Trustees of [address] [place] [address] [address] [address], under license to [employer]. All rights reserved    Score:  Initial: 15 Most Recent: X (Date: -- )     Interpretation of Tool:  Represents activities that are increasingly more difficult (i.e. Bed mobility, Transfers, Gait). Score 24 23 22-20 19-15 14-10 9-7 6       Modifier CH CI CJ CK CL CM CN         · Mobility - Walking and Moving Around:               - CURRENT STATUS:    CK - 40%-59% impaired, limited or restricted               - GOAL STATUS:           CJ - 20%-39% impaired, limited or restricted               - D/C STATUS:                       ---------------To be determined---------------  Payor: BLUE CROSS / Plan: [place] BLUE CROSS [facility] / Product Type: PPO /       Medical Necessity:     · Patient is expected to demonstrate progress in strength, range of motion, balance, coordination and functional technique to increase independence with   and improve safety during all functional mobility. Reason for Services/Other Comments:  · Patient continues to require skilled intervention due to medical complications and decline in functional mobiltiy. Use of outcome tool(s) and clinical judgement create a POC that gives a: Questionable prediction of patient's progress: MODERATE COMPLEXITY                 TREATMENT:   (In addition to Assessment/Re-Assessment sessions the following treatments were rendered)   Pre-treatment Symptoms/Complaints:  \"I like this chair better. \"  Pain: Initial:   Pain Intensity 1:  (some pain with movement but not rated)  Post Session:  Pain noted at end of treatment - not rated though. Therapeutic Activity: (    15 minutes): Therapeutic activities including Bed transfers, Chair transfers, Ambulation on level ground and instruction in spine precautions  to improve mobility, strength and balance. Required minimal cueing   to promote correct body mechanics with all mobility.      Therapeutic Exercise: (10 Minutes):  Exercises per grid below to improve mobility, strength, balance, coordination and activity tolerance. Required minimal verbal cues to promote proper body alignment and promote proper body breathing techniques. Progressed repetitions and complexity of movement as indicated. Date:  8-11-17 Date:   Date:     Activity/Exercise Parameters Parameters Parameters   Seated heel raises/toe raises x15     Seated LAQ x15     Seated marching x15     Seated hip abduction x15                         Braces/Orthotics/Lines/Etc:   · back brace on while up  Treatment/Session Assessment:    · Response to Treatment:  See above. · Interdisciplinary Collaboration:  · Physical Therapy Assistant and Registered Nurse  · After treatment position/precautions:  · Up in chair, Bed/Chair-wheels locked, Call light within reach and RN notified  · Compliance with Program/Exercises: compliant all of the time. · Recommendations/Intent for next treatment session: \"Next visit will focus on advancements to more challenging activities and reduction in assistance provided\".   Total Treatment Duration:PT Patient Time In/Time Out  Time In: 1010  Time Out: [address], PTA

## 2017-08-11 NOTE — PROGRESS NOTES
ORTHO PROGRESS NOTE    2017    Admit Date: 2017  Admit Diagnosis: Lumbar spinal stenosis [M48.06]  Spondylolisthesis, unspecified spinal region [M43.10]  Foot drop, bilateral [M21.371, M21.372]  Post Op day: 3 Days Post-Op      Subjective:     Buzz Mcallister is a patient who is now 3 Days Post-Op  and has no complaints. Objective:     PT/OT: progressing        Vital Signs:    Patient Vitals for the past 8 hrs:   BP Temp Pulse Resp SpO2 Weight   17 0235 - - - - - 108.5 kg (239 lb 1.6 oz)   17 023 120/69 98.1 °F (36.7 °C) 74 18 95 % -     Temp (24hrs), Av.3 °F (36.8 °C), Min:98.1 °F (36.7 °C), Max:98.5 °F (36.9 °C)      LAB:    Recent Labs      17   0425   HGB  11.8*   WBC  7.6   PLT  89*       I/O:      1901 -  0700  In:  [P.O.:1800; I.V.:250]  Out: 3863 [Urine:3625; Drains:305]    Physical Exam:    Awake and in no acute distress. Mood and affect appropriate. Respirations unlabored and no evidence cyanosis. Calves nontender. Abdomen soft and nontender. Dressing clean/dry  No new neurologic deficit. 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  Medically stable to go      Signed By: Marie Hare NP

## 2017-08-11 NOTE — PROGRESS NOTES
Call placed to NP to verify drain removal. She decided to leave the drain in and remove it in the AM. Order entered.

## 2017-08-11 NOTE — PROGRESS NOTES
Spoke with Cathy regarding insurance approval with [employer] for [facility]. Continue to wait for approval at this time.

## 2017-08-11 NOTE — PROGRESS NOTES
Problem: Self Care Deficits Care Plan (Adult)  Goal: *Acute Goals and Plan of Care (Insert Text)  1. Patient will verbalize and demonstrate understanding of spinal precautions with 100% accuracy during ADLs. 2. Patient will complete lower body bathing and dressing with minimal assistance3 and adaptive equipment as needed. 3. Patient will complete functional transfers with SBA and adaptive equipment as needed. 4. Patient will complete toileting and toilet transfer with minimal assistance. 5. Patient will complete functional mobility of household distances with CGA and adaptive equipment as needed. 6. Patient will demonstrate ability to log roll in bed with modified independence and no verbal cues from therapist.      Timeframe: 7 visits     Comments:       OCCUPATIONAL THERAPY: Initial Assessment and AM 8/11/2017  INPATIENT: Hospital Day: 4  Payor: Carmen Severance / Plan: [address] / Product Type: PPO /      NAME/AGE/GENDER: Arielle Fisher is a 63 y.o. male       PRIMARY DIAGNOSIS:  Lumbar spinal stenosis [M48.06]  Spondylolisthesis, unspecified spinal region [M43.10]  Foot drop, bilateral [M21.371, M21.372] Spinal stenosis of lumbar region Spinal stenosis of lumbar region  Procedure(s) (LRB):  L3-L5 LAMI FUSION TLIF (N/A)  3 Days Post-Op  ICD-10: Treatment Diagnosis:        · Generalized Muscle Weakness (M62.81)  · Other lack of cordination (R27.8)  · Low Back Pain (M54.5)   Precautions/Allergies:        spinal precautions Adhesive       ASSESSMENT:      Mr. Jeannette Kenny presents to hospital for above procedure. Pt lives alone in a mobile home, there are 2 CINTHIA. Pt is typically independent with ADLs/IADLs, including driving and working. Today, pt presents supine in bed upon arrival. He is very pleasant and agreeable to OT evaluation. He reports 6/10 pain in his lower back at the site of incision. Pt is able to complete log rolling with SBA and moves to sitting with SBA.  He demonstrates good static and fair dynamic sitting balance at EOB. Pt requires moderate assistance to don LSO. He completes STS with min A and RW and transfers to chair with CGA/min A and RW; pt demonstrates fair balance in standing with 1 LOB noted. Pt has drop foot present in bilateral feet which pose a fall risk, L foot appears to have more dorsiflexion than does his R foot. Mr. Adam Conley was left seated in chair with all needs met and possessions within reach. He is functioning well below his baseline with concern for decreased activity tolerance, balance, strength, ADL performance, and functional mobility; he requires continued skilled OT services aimed at maximizing safety and independence with ADLs. Will follow during acute stay. This section established at most recent assessment   PROBLEM LIST (Impairments causing functional limitations):  1. Decreased Strength  2. Decreased ADL/Functional Activities  3. Decreased Transfer Abilities  4. Decreased Ambulation Ability/Technique  5. Decreased Balance  6. Increased Pain  7. Decreased Activity Tolerance  8. Decreased Work Simplification/Energy Conservation Techniques  9. Increased Fatigue  10. Decreased Flexibility/Joint Mobility  11. Decreased Knowledge of Precautions    INTERVENTIONS PLANNED: (Benefits and precautions of occupational therapy have been discussed with the patient.)  1. Activities of daily living training  2. Adaptive equipment training  3. Balance training  4. Clothing management  5. Donning&doffing training  6. Group therapy  7. Therapeutic activity  8. Therapeutic exercise      TREATMENT PLAN: Frequency/Duration: Follow patient 4x/week to address above goals. Rehabilitation Potential For Stated Goals: GOOD      RECOMMENDED REHABILITATION/EQUIPMENT: (at time of discharge pending progress): Continue Skilled Therapy.  [facility]                       OCCUPATIONAL PROFILE AND HISTORY:   History of Present Injury/Illness (Reason for Referral):  See H&P  Past Medical History/Comorbidities:   Mr. Tello Lemus  has a past medical history of Arthritis; Cellulitis and abscess of trunk (December, 2009); Chronic pain; Depression; Ex-smoker for more than 1 year; Hypertension; Insomnia; PE (pulmonary thromboembolism) ([facility] [address] (2007); Renal failure (12/10/2009); S/P lobectomy of lung; and Thromboembolus ([facility] [place] [address] (2009, 2011). He also has no past medical history of Coagulation defects; COPD; Other ill-defined conditions; or Unspecified adverse effect of anesthesia. Mr. Tello Lemus  has a past surgical history that includes hip replacement (2007); thoracotomy (December, 14, 2009); lobectomy (12/01/2009); chest surgery procedure unlisted; vascular surgery procedure unlist (2009); and gi (April, 2008). Social History/Living Environment:   Home Environment: Trailer/mobile home  # Steps to Enter: 2  Rails to [employer]: No  One/Two Story Residence: One story  Living Alone: Yes  Support Systems: Spouse/Significant Other/Partner, Friends \ neighbors, Family member(s), Child(bee)  Patient Expects to be Discharged to[de-identified] Rehabilitation facility  Current DME Used/Available at Home: Walker  Tub or Shower Type: Shower  Prior Level of Function/Work/Activity:  Independent at baseline with ADLs/IADLs. Drives. Works. Personal Factors:          Sex:  male        Age:  63 y.o. Overall Behavior:  Motivated    Number of Personal Factors/Comorbidities that affect the Plan of Care: Expanded review of therapy/medical records (1-2):  MODERATE COMPLEXITY   ASSESSMENT OF OCCUPATIONAL PERFORMANCE[de-identified]   Activities of Daily Living:           Basic ADLs (From Assessment) Complex ADLs (From Assessment)   Basic ADL  Feeding: Independent  Oral Facial Hygiene/Grooming: Modified Independent  Bathing: Moderate assistance  Upper Body Dressing:  Moderate assistance  Lower Body Dressing: Maximum assistance  Toileting: Maximum assistance Instrumental ADL  Meal Preparation: Moderate assistance  Homemaking: Maximum assistance  Medication Management: Independent  Financial Management: Independent   Grooming/Bathing/Dressing Activities of Daily Living     Cognitive Retraining  Safety/Judgement: Awareness of environment; Fall prevention;Home safety;Driving appropriateness                       Bed/Mat Mobility  Rolling: Stand-by asssistance  Supine to Sit: Stand-by asssistance  Sit to Stand: Minimum assistance          Most Recent Physical Functioning:   Gross Assessment:  AROM: Generally decreased, functional  Strength: Generally decreased, functional               Posture:  Posture (WDL): Exceptions to WDL  Posture Assessment: Forward head  Balance:  Sitting: Impaired  Sitting - Static: Good (unsupported)  Sitting - Dynamic: Fair (occasional)  Standing: Impaired  Standing - Static: Fair  Standing - Dynamic : Fair Bed Mobility:  Rolling: Stand-by asssistance  Supine to Sit: Stand-by asssistance  Wheelchair Mobility:     Transfers:  Sit to Stand: Minimum assistance  Stand to Sit: Minimum assistance                    Patient Vitals for the past 6 hrs:       BP BP Patient Position SpO2 Pulse   08/11/17 0739 118/73 At rest;Head of bed elevated (Comment degrees) 95 % 72   08/11/17 0842 114/65 - - 82        Mental Status  Neurologic State: Alert  Orientation Level: Oriented X4  Cognition: Appropriate decision making, Appropriate for age attention/concentration, Appropriate safety awareness, Follows commands  Perception: Appears intact  Perseveration: No perseveration noted  Safety/Judgement: Awareness of environment, Fall prevention, Home safety, Driving appropriateness                               Physical Skills Involved:  1. Range of Motion  2. Balance  3. Strength  4. Activity Tolerance  5. Gross Motor Control  6. Pain (acute) Cognitive Skills Affected (resulting in the inability to perform in a timely and safe manner): 1. none Psychosocial Skills Affected:  1. Habits/Routines  2. Environmental Adaptation  3.  Social Roles   Number of elements that affect the Plan of Care: 5+:  HIGH COMPLEXITY   CLINICAL DECISION MAKIN[address] [place] [address] AM-PAC 6 Clicks   Daily Activity Inpatient Short Form  How much help from another person does the patient currently need. .. Total A Lot A Little None   1. Putting on and taking off regular lower body clothing?   [ ] 1   [X] 2   [ ] 3   [ ] 4   2. Bathing (including washing, rinsing, drying)? [ ] 1   [X] 2   [ ] 3   [ ] 4   3. Toileting, which includes using toilet, bedpan or urinal?   [ ] 1   [X] 2   [ ] 3   [ ] 4   4. Putting on and taking off regular upper body clothing?   [ ] 1   [X] 2   [ ] 3   [ ] 4   5. Taking care of personal grooming such as brushing teeth? [ ] 1   [ ] 2   [ ] 3   [X] 4   6. Eating meals? [ ] 1   [ ] 2   [ ] 3   [X] 4   © , Trustees of [address] [place] [address], under license to [employer]. All rights reserved    Score:  Initial: 16 Most Recent: X (Date: -- )     Interpretation of Tool:  Represents activities that are increasingly more difficult (i.e. Bed mobility, Transfers, Gait). Score 24 23 22-20 19-15 14-10 9-7 6       Modifier CH CI CJ CK CL CM CN         · Self Care:               - CURRENT STATUS:    CK - 40%-59% impaired, limited or restricted               - GOAL STATUS:           CJ - 20%-39% impaired, limited or restricted               - D/C STATUS:                       ---------------To be determined---------------  Payor: BLUE CROSS / Plan: [place] BLUE CROSS [facility] / Product Type: PPO /       Medical Necessity:     · Patient demonstrates good rehab potential due to higher previous functional level. Reason for Services/Other Comments:  · Patient continues to demonstrate capacity to improve strength, activity tolerance, balance, and functional technique which will increase independence.    Use of outcome tool(s) and clinical judgement create a POC that gives a: MODERATE COMPLEXITY             TREATMENT:   (In addition to Assessment/Re-Assessment sessions the following treatments were rendered)      Pre-treatment Symptoms/Complaints:    Pain: Initial:   Pain Intensity 1: 6  Pain Location 1: Back  Pain Orientation 1: Lower  Pain Intervention(s) 1: Ambulation/Increased Activity, Emotional support, Repositioned  Post Session:  5/10 at rest      Assessment/Reassessment only, no treatment provided today     Braces/Orthotics/Lines/Etc:   · drain lumbar  · O2 Device: Room air  Treatment/Session Assessment:    · Response to Treatment:  eval only  · Interdisciplinary Collaboration:  · Physical Therapy Assistant  · Occupational Therapist  · Registered Nurse  · After treatment position/precautions:  · Up in chair  · Bed/Chair-wheels locked  · Call light within reach  · RN notified  · Compliance with Program/Exercises: compliant all of the time. · Recommendations/Intent for next treatment session: \"Next visit will focus on advancements to more challenging activities and reduction in assistance provided\".   Total Treatment Duration:  OT Patient Time In/Time Out  Time In: 0930  Time Out: [address], [address]

## 2017-08-12 ENCOUNTER — HOSPITAL ENCOUNTER (INPATIENT)
Age: 62
LOS: 18 days | Discharge: HOME HEALTH | DRG: 949 | End: 2017-08-30
Attending: PHYSICAL MEDICINE & REHABILITATION | Admitting: PEDIATRICS
Payer: COMMERCIAL

## 2017-08-12 VITALS
HEIGHT: 73 IN | SYSTOLIC BLOOD PRESSURE: 114 MMHG | DIASTOLIC BLOOD PRESSURE: 72 MMHG | HEART RATE: 69 BPM | OXYGEN SATURATION: 95 % | BODY MASS INDEX: 31.83 KG/M2 | WEIGHT: 240.2 LBS | RESPIRATION RATE: 18 BRPM | TEMPERATURE: 98.2 F

## 2017-08-12 DIAGNOSIS — J47.9 BRONCHIECTASIS WITHOUT COMPLICATION (HCC): ICD-10-CM

## 2017-08-12 DIAGNOSIS — Z90.2 STATUS POST PARTIAL LOBECTOMY OF LUNG: ICD-10-CM

## 2017-08-12 DIAGNOSIS — Z86.718 PERSONAL HISTORY OF DVT (DEEP VEIN THROMBOSIS): ICD-10-CM

## 2017-08-12 DIAGNOSIS — M43.16 SPONDYLOLISTHESIS OF LUMBAR REGION: ICD-10-CM

## 2017-08-12 DIAGNOSIS — D64.9 ANEMIA, UNSPECIFIED TYPE: ICD-10-CM

## 2017-08-12 DIAGNOSIS — M48.061 SPINAL STENOSIS OF LUMBAR REGION: ICD-10-CM

## 2017-08-12 LAB
INR PPP: 1.4 (ref 0.9–1.2)
PROTHROMBIN TIME: 15.2 SEC (ref 9.6–12)

## 2017-08-12 PROCEDURE — 97530 THERAPEUTIC ACTIVITIES: CPT

## 2017-08-12 PROCEDURE — 85610 PROTHROMBIN TIME: CPT | Performed by: NURSE PRACTITIONER

## 2017-08-12 PROCEDURE — 74011250636 HC RX REV CODE- 250/636: Performed by: ORTHOPAEDIC SURGERY

## 2017-08-12 PROCEDURE — 74011250637 HC RX REV CODE- 250/637: Performed by: ORTHOPAEDIC SURGERY

## 2017-08-12 PROCEDURE — 65310000000 HC RM PRIVATE REHAB

## 2017-08-12 PROCEDURE — 97110 THERAPEUTIC EXERCISES: CPT

## 2017-08-12 PROCEDURE — 74011250637 HC RX REV CODE- 250/637: Performed by: PHYSICAL MEDICINE & REHABILITATION

## 2017-08-12 PROCEDURE — 74011636637 HC RX REV CODE- 636/637: Performed by: ORTHOPAEDIC SURGERY

## 2017-08-12 PROCEDURE — 36415 COLL VENOUS BLD VENIPUNCTURE: CPT | Performed by: NURSE PRACTITIONER

## 2017-08-12 RX ORDER — AMOXICILLIN 250 MG
1 CAPSULE ORAL DAILY
Status: DISCONTINUED | OUTPATIENT
Start: 2017-08-13 | End: 2017-08-13

## 2017-08-12 RX ORDER — ENOXAPARIN SODIUM 100 MG/ML
40 INJECTION SUBCUTANEOUS EVERY 24 HOURS
Status: DISCONTINUED | OUTPATIENT
Start: 2017-08-13 | End: 2017-08-14

## 2017-08-12 RX ORDER — HYDROCODONE BITARTRATE AND ACETAMINOPHEN 5; 325 MG/1; MG/1
2 TABLET ORAL
Status: DISCONTINUED | OUTPATIENT
Start: 2017-08-12 | End: 2017-08-13

## 2017-08-12 RX ORDER — WARFARIN 10 MG/1
10 TABLET ORAL
Status: DISCONTINUED | OUTPATIENT
Start: 2017-08-12 | End: 2017-08-16

## 2017-08-12 RX ORDER — TRAZODONE HYDROCHLORIDE 50 MG/1
50 TABLET ORAL
Status: DISCONTINUED | OUTPATIENT
Start: 2017-08-12 | End: 2017-08-13

## 2017-08-12 RX ORDER — ONDANSETRON 4 MG/1
4 TABLET, ORALLY DISINTEGRATING ORAL
Status: DISCONTINUED | OUTPATIENT
Start: 2017-08-12 | End: 2017-08-30 | Stop reason: HOSPADM

## 2017-08-12 RX ORDER — NEBIVOLOL 5 MG/1
5 TABLET ORAL DAILY
Status: DISCONTINUED | OUTPATIENT
Start: 2017-08-13 | End: 2017-08-30 | Stop reason: HOSPADM

## 2017-08-12 RX ADMIN — STANDARDIZED SENNA CONCENTRATE AND DOCUSATE SODIUM 2 TABLET: 8.6; 5 TABLET, FILM COATED ORAL at 08:56

## 2017-08-12 RX ADMIN — FAMOTIDINE 20 MG: 20 TABLET ORAL at 08:56

## 2017-08-12 RX ADMIN — HYDROCODONE BITARTRATE AND ACETAMINOPHEN 1 TABLET: 10; 325 TABLET ORAL at 05:45

## 2017-08-12 RX ADMIN — Medication 10 ML: at 05:42

## 2017-08-12 RX ADMIN — NEBIVOLOL HYDROCHLORIDE 5 MG: 5 TABLET ORAL at 08:56

## 2017-08-12 RX ADMIN — PREDNISONE 20 MG: 20 TABLET ORAL at 08:56

## 2017-08-12 RX ADMIN — HYDROCODONE BITARTRATE AND ACETAMINOPHEN 1 TABLET: 10; 325 TABLET ORAL at 02:20

## 2017-08-12 RX ADMIN — HYDROCODONE BITARTRATE AND ACETAMINOPHEN 2 TABLET: 5; 325 TABLET ORAL at 18:25

## 2017-08-12 RX ADMIN — HYDROCODONE BITARTRATE AND ACETAMINOPHEN 2 TABLET: 5; 325 TABLET ORAL at 23:55

## 2017-08-12 RX ADMIN — HYDROCODONE BITARTRATE AND ACETAMINOPHEN 1 TABLET: 10; 325 TABLET ORAL at 14:14

## 2017-08-12 RX ADMIN — TRAZODONE HYDROCHLORIDE 50 MG: 50 TABLET ORAL at 22:43

## 2017-08-12 RX ADMIN — HYDROCODONE BITARTRATE AND ACETAMINOPHEN 1 TABLET: 10; 325 TABLET ORAL at 10:12

## 2017-08-12 RX ADMIN — WARFARIN SODIUM 10 MG: 10 TABLET ORAL at 22:43

## 2017-08-12 RX ADMIN — ENOXAPARIN SODIUM 40 MG: 40 INJECTION SUBCUTANEOUS at 08:56

## 2017-08-12 NOTE — IP AVS SNAPSHOT
Rashida Galindo 
 
 
 [address] [facility] 
[phone] Patient: Gopal Hunter 
MRN: [id number] :[id number] You are allergic to the following Allergen Reactions Adhesive Contact Dermatitis Recent Documentation Smoking Status Former Smoker Emergency Contacts Name Discharge Info Relation Home Work Mobile Becky Macdonald  Child [2] [phone] Edwin Tovar  Girlfriend [18] [phone] About your hospitalization You were admitted on:  2017 You last received care in the:  [facility] INPATIENT REHAB UNIT You were discharged on:  2017 Unit phone number:  [phone] Why you were hospitalized Your primary diagnosis was:  Spinal Stenosis Of Lumbar Region Your diagnoses also included:  Bronchiectasis (Hcc), Status Post Partial Lobectomy Of Lung, Personal History Of Dvt (Deep Vein Thrombosis) Providers Seen During Your Hospitalizations Provider Role Specialty Primary office phone Anais Winter MD Attending Provider Physical Medicine and Rehab [phone] Your Primary Care Physician (PCP) Primary Care Physician Office Phone Office Fax Willethan RamirezMcGrann [phone] Follow-up Information Follow up With Details Comments Contact Info Bobby Jordan MD   [address] 
[phone] Bernarda Vo will be contacted prior to first visit. [place] [address] [address] [facility] [address] 
[phone] Bobby Jordan MD Call for follow up appointment after being in hospital and blood draw for INR. [address] 
[phone] Bobby Jordan MD   [address] 
[phone]  Anais Winter MD On 10/4/2017 follow up appointment at 09:00. [address] [place] [address] 
 [address] [facility] [address] 
[phone] Baudilio Boothe MD Call call to make a follow up appointment Delroy Bui [facility] [address] 
[phone] Your Appointments Wednesday October 04, 2017  9:00 AM EDT Follow Up with Jaclyn Brandt MD  
[facility] ([address]) [address] [facility] [address] [facility] [address]  
[phone] Current Discharge Medication List  
  
START taking these medications Dose & Instructions Dispensing Information Comments Morning Noon Evening Bedtime  
 cyclobenzaprine 10 mg tablet Commonly known as:  FLEXERIL Your last dose was: Your next dose is:    
   
   
 Dose:  5 mg Take 0.5 Tabs by mouth three (3) times daily as needed for Muscle Spasm(s). Quantity:  60 Tab Refills:  0  
     
   
   
   
  
 oxyCODONE IR 5 mg immediate release tablet Commonly known as:  Kristen Mageladior Your last dose was: Your next dose is:    
   
   
 Dose:  5-15 mg Take 1-3 Tabs by mouth every four (4) hours as needed. Max Daily Amount: 90 mg. Quantity:  80 Tab Refills:  0 CONTINUE these medications which have CHANGED Dose & Instructions Dispensing Information Comments Morning Noon Evening Bedtime * BYSTOLIC 5 mg tablet Generic drug:  nebivolol What changed:  Another medication with the same name was added. Make sure you understand how and when to take each. Your last dose was: Your next dose is:    
   
   
 Dose:  5 mg Take 5 mg by mouth every morning. Indications: HYPERTENSION Refills:  0  
     
   
   
   
  
 * nebivolol 5 mg tablet Commonly known as:  BYSTOLIC What changed: You were already taking a medication with the same name, and this prescription was added. Make sure you understand how and when to take each. Your last dose was: Your next dose is:    
   
   
 Dose:  5 mg Take 1 Tab by mouth daily. Quantity:  30 Tab Refills:  6  
     
   
   
   
  
 * traZODone 50 mg tablet Commonly known as:  Piter Villeda What changed:  Another medication with the same name was added. Make sure you understand how and when to take each. Your last dose was: Your next dose is:    
   
   
 Dose:  25-50 mg Take 25-50 mg by mouth nightly as needed for Sleep. Refills:  0  
     
   
   
   
  
 * traZODone 50 mg tablet Commonly known as:  Piter Villeda What changed: You were already taking a medication with the same name, and this prescription was added. Make sure you understand how and when to take each. Your last dose was: Your next dose is:    
   
   
 Dose:  50 mg Take 1 Tab by mouth nightly as needed for Sleep. Quantity:  30 Tab Refills:  6  
     
   
   
   
  
 * warfarin 10 mg tablet Commonly known as:  COUMADIN What changed:  Another medication with the same name was added. Make sure you understand how and when to take each. Your last dose was: Your next dose is:    
   
   
 Dose:  1 Tab Take 1 Tab by mouth daily. Rx Dr Shanae Chacko PCP--- hx of multiple blood clots and PE following hip replacement (2007) -- Hold for 5 days prior to surgery with permission from Dr Shanae Chacko-- last dose 7/31/17 Refills:  0  
     
   
   
   
  
 * warfarin 10 mg tablet Commonly known as:  COUMADIN What changed: You were already taking a medication with the same name, and this prescription was added. Make sure you understand how and when to take each. Your last dose was: Your next dose is:    
   
   
 Dose:  10 mg Take 1 Tab by mouth nightly. Quantity:  30 Tab Refills:  3  
     
   
   
   
  
 * Notice: This list has 6 medication(s) that are the same as other medications prescribed for you.  Read the directions carefully, and ask your doctor or other care provider to review them with you. CONTINUE these medications which have NOT CHANGED Dose & Instructions Dispensing Information Comments Morning Noon Evening Bedtime DEPO-TESTOSTERONE 200 mg/mL injection Generic drug:  testosterone cypionate Your last dose was: Your next dose is:    
   
   
 by IntraMUSCular route every fourteen (14) days. Refills:  0 STOP taking these medications HYDROcodone-acetaminophen  mg tablet Commonly known as:  NORCO  
   
  
 LOVENOX 40 mg/0.4 mL Generic drug:  enoxaparin  
   
  
 predniSONE 20 mg tablet Commonly known as:  Edgardo Ferraris Where to Get Your Medications Information on where to get these meds will be given to you by the nurse or doctor. ! Ask your nurse or doctor about these medications  
  cyclobenzaprine 10 mg tablet  
 nebivolol 5 mg tablet  
 oxyCODONE IR 5 mg immediate release tablet  
 traZODone 50 mg tablet  
 warfarin 10 mg tablet Discharge Instructions None Discharge Orders None [employer] Announcement We are excited to announce that we are making your provider's discharge notes available to you in [employer]. You will see these notes when they are completed and signed by the physician that discharged you from your recent hospital stay. If you have any questions or concerns about any information you see in [employer], please call the Health Information Department where you were seen or reach out to your Primary Care Provider for more information about your plan of care. Introducing [facility] SERVICES! Buster Camara introduces [employer] patient portal. Now you can access parts of your medical record, email your doctor's office, and request medication refills online. 1. In your internet browser, go to [email or web address] 2. Click on the First Time User? Click Here link in the Sign In box.  You will see the New Member Sign Up page. 3. Enter your [employer] Access Code exactly as it appears below. You will not need to use this code after youve completed the sign-up process. If you do not sign up before the expiration date, you must request a new code. · [employer] Access Code: [email or web address] Expires: 10/29/2017  5:52 PM 
 
4. Enter the last four digits of your Social Security Number (xxxx) and Date of Birth (mm/dd/yyyy) as indicated and click Submit. You will be taken to the next sign-up page. 5. Create a [employer] ID. This will be your [employer] login ID and cannot be changed, so think of one that is secure and easy to remember. 6. Create a [employer] password. You can change your password at any time. 7. Enter your Password Reset Question and Answer. This can be used at a later time if you forget your password. 8. Enter your e-mail address. You will receive e-mail notification when new information is available in [address] 9. Click Sign Up. You can now view and download portions of your medical record. 10. Click the Download Summary menu link to download a portable copy of your medical information. If you have questions, please visit the Frequently Asked Questions section of the [employer] website. Remember, [employer] is NOT to be used for urgent needs. For medical emergencies, dial 911. Now available from your iPhone and Android! General Information Please provide this summary of care documentation to your next provider. Patient Signature:  ____________________________________________________________ Date:  ____________________________________________________________  
  
Eric Endo Provider Signature:  ____________________________________________________________ Date:  ____________________________________________________________

## 2017-08-12 NOTE — PROGRESS NOTES
Serosanguinous fluid noted from old drain site when patient stood to work with PT. Dressing saturated. Patient bathed and area cleaned, no drainage noted at this time. Dry dressing reapplied. Patient tolerated well. Will continue to monitor.

## 2017-08-12 NOTE — DISCHARGE INSTRUCTIONS
Deep Vein Thrombosis: Care Instructions  Your Care Instructions    A deep vein thrombosis (DVT) is a blood clot in certain veins of the legs, pelvis, or arms. Blood clots in these veins need to be treated because they can get bigger, break loose, and travel through the bloodstream to the lungs. A blood clot in a lung can be life-threatening. The doctor may have given you a blood thinner (anticoagulant). A blood thinner can stop the blood clot from growing larger and prevent new clots from forming. You will need to take a blood thinner for 3 to 6 months or longer. The doctor has checked you carefully, but problems can develop later. If you notice any problems or new symptoms, get medical treatment right away. Follow-up care is a key part of your treatment and safety. Be sure to make and go to all appointments, and call your doctor if you are having problems. It's also a good idea to know your test results and keep a list of the medicines you take. How can you care for yourself at home? · Take your medicines exactly as prescribed. Call your doctor if you think you are having a problem with your medicine. · If you are taking a blood thinner, be sure you get instructions about how to take your medicine safely. Blood thinners can cause serious bleeding problems. · Wear compression stockings if your doctor recommends them. These stockings are tighter at the feet than on the legs. They may reduce pain and swelling in your legs. But there are different types of stockings, and they need to fit right. So your doctor will recommend what you need. · When you sit, use a pillow to raise the arm or leg that has the blood clot. Try to keep it above the level of your heart. When should you call for help? Call 911 anytime you think you may need emergency care. For example, call if:  · You passed out (lost consciousness). · You have symptoms of a blood clot in your lung (called a pulmonary embolism).  These include:  ¨ Sudden chest pain. ¨ Trouble breathing. ¨ Coughing up blood. Call your doctor now or seek immediate medical care if:  · You have new or worse trouble breathing. · You are dizzy or lightheaded, or you feel like you may faint. · You have symptoms of a blood clot in your arm or leg. These may include:  ¨ Pain in the arm, calf, back of the knee, thigh, or groin. ¨ Redness and swelling in the arm, leg, or groin. Watch closely for changes in your health, and be sure to contact your doctor if:  · You do not get better as expected. Where can you learn more? Go to http://magdi-jerilyn.info/. Enter J752 in the search box to learn more about \"Deep Vein Thrombosis: Care Instructions. \"  Current as of: March 20, 2017  Content Version: 11.3  © 2389-1026 [employer]. Care instructions adapted under license by [employer] (which disclaims liability or warranty for this information). If you have questions about a medical condition or this instruction, always ask your healthcare professional. [address] any warranty or liability for your use of this information. Ã¯Â»Â¿  Anticoagulants: After Your Visit  Your Care Instructions  Your doctor prescribed an anticoagulant medicine. Anticoagulants, often called blood thinners, prevent new blood clots from forming and keep existing clots from getting larger. They do not actually thin the blood, but they make the blood take longer to clot. This lowers the risk of a blood clot moving to the lungs (pulmonary embolism) or moving to the brain and causing a stroke. Blood thinners come in two forms. Heparin is given by shot, either under your skin or through a needle in your vein, and starts working right away. Warfarin (Coumadin) comes in pill form and takes longer to work. Your doctor may have you begin taking both forms at the same time.  As soon as the pills start to work, you will stop the shots but continue to take the pills. If you have a blood clot in your leg, you may need to take warfarin for several months. People who have heart conditions such as atrial fibrillation often need to take it for the rest of their lives. The right dose of this medicine is different for each person. You will need regular blood tests to see if your dose is correct. Follow-up care is a key part of your treatment and safety. Be sure to make and go to all appointments, and call your doctor if you are having problems. Itâs also a good idea to know your test results and keep a list of the medicines you take. How do you take blood thinners? · Take your medicines exactly as prescribed. Call your doctor if you think you are having a problem with your medicine. · Call your doctor if you are not sure what to do if you missed a dose of blood thinner. ¨ Your doctor can tell you exactly what to do so you do not take too much or too little blood thinner. Then you will be as safe as possible. · Some general rules for what to do if you miss a dose:  ¨ If you remember it in the same day, take the missed dose. Then go back to your regular schedule. ¨ If it is the next day, or almost time to take the next dose, do not take the missed dose. Do not double the dose to make up for the missed one. At your next regularly scheduled time, take your normal dose. ¨ If you miss your dose for 2 or more days, call your doctor. · To help you stay on schedule, use a calendar to remind you when to take your blood thinner. When you take the medicine, note it on the calendar. · If you are going to give yourself shots, your doctor will give you instructions for how to safely inject the medicine. Follow the directions carefully. · Do not take any vitamins, over-the-counter medicines, or herbal products without talking to your doctor first.  · Avoid contact sports and other activities that could lead to injury.  Make your home safe and take other measures to reduce your risk of falling. Always wear a seat belt while in a car. · Do not suddenly change your intake of vitamin Kârich foods, such as broccoli, cabbage, asparagus, lettuce, and spinach. This will help blood thinners work evenly from day to day. · Limit alcohol to 2 drinks a day for men and 1 drink a day for women. Alcohol may interfere with blood thinners. It also increases your risk of falls, which can cause bruising and bleeding. · Tell your dentist, pharmacist, and other health professionals that you take blood thinners. Wear medical alert jewelry that says you take blood thinners. You can buy this at most [employer]. When should you call for help? Call 911 anytime you think you may need emergency care. For example, call if:  · You cough up blood. · You vomit blood or what looks like coffee grounds. · You pass maroon or very bloody stools. Call your doctor now or seek immediate medical care if:  · You have new bruises or blood spots under your skin. · You have a nosebleed. · Your gums bleed when you brush your teeth. · You have blood in your urine. · Your stools are black and tarlike or have streaks of blood. · You have vaginal bleeding when you are not having your period, or heavy period bleeding. Watch closely for changes in your health, and be sure to contact your doctor if:  · You have questions about your medicine. Where can you learn more? Go to Bookmytrainings.com.be  Enter A606 in the search box to learn more about \"Anticoagulants: After Your Visit. \"   Â© 6373-1108 [employer]. Care instructions adapted under license by [employer] (which disclaims liability or warranty for this information).  This care instruction is for use with your licensed healthcare professional. If you have questions about a medical condition or this instruction, always ask your healthcare professional. [place] [address] any warranty or liability for your use of this information. Content Version: 1.1.99014; Last Revised: July 1, 2009     High Blood Pressure: Care Instructions  Your Care Instructions  If your blood pressure is usually above 140/90, you have high blood pressure, or hypertension. That means the top number is 140 or higher or the bottom number is 90 or higher, or both. Despite what a lot of people think, high blood pressure usually doesn't cause headaches or make you feel dizzy or lightheaded. It usually has no symptoms. But it does increase your risk for heart attack, stroke, and kidney or eye damage. The higher your blood pressure, the more your risk increases. Your doctor will give you a goal for your blood pressure. Your goal will be based on your health and your age. An example of a goal is to keep your blood pressure below 140/90. Lifestyle changes, such as eating healthy and being active, are always important to help lower blood pressure. You might also take medicine to reach your blood pressure goal.  Follow-up care is a key part of your treatment and safety. Be sure to make and go to all appointments, and call your doctor if you are having problems. It's also a good idea to know your test results and keep a list of the medicines you take. How can you care for yourself at home? Medical treatment  · If you stop taking your medicine, your blood pressure will go back up. You may take one or more types of medicine to lower your blood pressure. Be safe with medicines. Take your medicine exactly as prescribed. Call your doctor if you think you are having a problem with your medicine. · Talk to your doctor before you start taking aspirin every day. Aspirin can help certain people lower their risk of a heart attack or stroke. But taking aspirin isn't right for everyone, because it can cause serious bleeding. · See your doctor regularly. You may need to see the doctor more often at first or until your blood pressure comes down.   · If you are taking blood pressure medicine, talk to your doctor before you take decongestants or anti-inflammatory medicine, such as ibuprofen. Some of these medicines can raise blood pressure. · Learn how to check your blood pressure at home. Lifestyle changes  · Stay at a healthy weight. This is especially important if you put on weight around the waist. Losing even 10 pounds can help you lower your blood pressure. · If your doctor recommends it, get more exercise. Walking is a good choice. Bit by bit, increase the amount you walk every day. Try for at least 30 minutes on most days of the week. You also may want to swim, bike, or do other activities. · Avoid or limit alcohol. Talk to your doctor about whether you can drink any alcohol. · Try to limit how much sodium you eat to less than 2,300 milligrams (mg) a day. Your doctor may ask you to try to eat less than 1,500 mg a day. · Eat plenty of fruits (such as bananas and oranges), vegetables, legumes, whole grains, and low-fat dairy products. · Lower the amount of saturated fat in your diet. Saturated fat is found in animal products such as milk, cheese, and meat. Limiting these foods may help you lose weight and also lower your risk for heart disease. · Do not smoke. Smoking increases your risk for heart attack and stroke. If you need help quitting, talk to your doctor about stop-smoking programs and medicines. These can increase your chances of quitting for good. When should you call for help? Call 911 anytime you think you may need emergency care. This may mean having symptoms that suggest that your blood pressure is causing a serious heart or blood vessel problem. Your blood pressure may be over 180/110. For example, call 911 if:  · You have symptoms of a heart attack. These may include:  ¨ Chest pain or pressure, or a strange feeling in the chest.  ¨ Sweating. ¨ Shortness of breath. ¨ Nausea or vomiting.   ¨ Pain, pressure, or a strange feeling in the back, neck, jaw, or upper belly or in one or both shoulders or arms. ¨ Lightheadedness or sudden weakness. ¨ A fast or irregular heartbeat. · You have symptoms of a stroke. These may include:  ¨ Sudden numbness, tingling, weakness, or loss of movement in your face, arm, or leg, especially on only one side of your body. ¨ Sudden vision changes. ¨ Sudden trouble speaking. ¨ Sudden confusion or trouble understanding simple statements. ¨ Sudden problems with walking or balance. ¨ A sudden, severe headache that is different from past headaches. · You have severe back or belly pain. Do not wait until your blood pressure comes down on its own. Get help right away. Call your doctor now or seek immediate care if:  · Your blood pressure is much higher than normal (such as 180/110 or higher), but you don't have symptoms. · You think high blood pressure is causing symptoms, such as:  ¨ Severe headache. ¨ Blurry vision. Watch closely for changes in your health, and be sure to contact your doctor if:  · Your blood pressure measures 140/90 or higher at least 2 times. That means the top number is 140 or higher or the bottom number is 90 or higher, or both. · You think you may be having side effects from your blood pressure medicine. · Your blood pressure is usually normal, but it goes above normal at least 2 times. Where can you learn more? Go to http://magdi-jerilyn.info/. Enter G905 in the search box to learn more about \"High Blood Pressure: Care Instructions. \"  Current as of: August 8, 2016  Content Version: 11.3  © 8954-4934 [employer]. Care instructions adapted under license by [employer] (which disclaims liability or warranty for this information). If you have questions about a medical condition or this instruction, always ask your healthcare professional. [place] [address] any warranty or liability for your use of this information.        Stopping Smoking: Care Instructions  Your Care Instructions  Cigarette smokers crave the nicotine in cigarettes. Giving it up is much harder than simply changing a habit. Your body has to stop craving the nicotine. It is hard to quit, but you can do it. There are many tools that people use to quit smoking. You may find that combining tools works best for you. There are several steps to quitting. First you get ready to quit. Then you get support to help you. After that, you learn new skills and behaviors to become a nonsmoker. For many people, a necessary step is getting and using medicine. Your doctor will help you set up the plan that best meets your needs. You may want to attend a smoking cessation program to help you quit smoking. When you choose a program, look for one that has proven success. Ask your doctor for ideas. You will greatly increase your chances of success if you take medicine as well as get counseling or join a cessation program.  Some of the changes you feel when you first quit tobacco are uncomfortable. Your body will miss the nicotine at first, and you may feel short-tempered and grumpy. You may have trouble sleeping or concentrating. Medicine can help you deal with these symptoms. You may struggle with changing your smoking habits and rituals. The last step is the tricky one: Be prepared for the smoking urge to continue for a time. This is a lot to deal with, but keep at it. You will feel better. Follow-up care is a key part of your treatment and safety. Be sure to make and go to all appointments, and call your doctor if you are having problems. Its also a good idea to know your test results and keep a list of the medicines you take. How can you care for yourself at home? · Ask your family, friends, and coworkers for support. You have a better chance of quitting if you have help and support. · Join a support group, such as Nicotine Anonymous, for people who are trying to quit smoking.   · Consider signing up for a smoking cessation program, such as the American Lung Association's Freedom from Smoking program.  · Set a quit date. Pick your date carefully so that it is not right in the middle of a big deadline or stressful time. Once you quit, do not even take a puff. Get rid of all ashtrays and lighters after your last cigarette. Clean your house and your clothes so that they do not smell of smoke. · Learn how to be a nonsmoker. Think about ways you can avoid those things that make you reach for a cigarette. ¨ Avoid situations that put you at greatest risk for smoking. For some people, it is hard to have a drink with friends without smoking. For others, they might skip a coffee break with coworkers who smoke. ¨ Change your daily routine. Take a different route to work or eat a meal in a different place. · Cut down on stress. Calm yourself or release tension by doing an activity you enjoy, such as reading a book, taking a hot bath, or gardening. · Talk to your doctor or pharmacist about nicotine replacement therapy, which replaces the nicotine in your body. You still get nicotine but you do not use tobacco. Nicotine replacement products help you slowly reduce the amount of nicotine you need. These products come in several forms, many of them available over-the-counter:  ¨ Nicotine patches  ¨ Nicotine gum and lozenges  ¨ Nicotine inhaler  · Ask your doctor about bupropion (Wellbutrin) or varenicline (Chantix), which are prescription medicines. They do not contain nicotine. They help you by reducing withdrawal symptoms, such as stress and anxiety. · Some people find hypnosis, acupuncture, and massage helpful for ending the smoking habit. · Eat a healthy diet and get regular exercise. Having healthy habits will help your body move past its craving for nicotine. · Be prepared to keep trying. Most people are not successful the first few times they try to quit. Do not get mad at yourself if you smoke again. Make a list of things you learned and think about when you want to try again, such as next week, next month, or next year. Where can you learn more? Go to http://magdi-jerilyn.info/. Enter D288 in the search box to learn more about \"Stopping Smoking: Care Instructions. \"  Current as of: March 20, 2017  Content Version: 11.3  © 7230-8223 [employer]. Care instructions adapted under license by [employer] (which disclaims liability or warranty for this information). If you have questions about a medical condition or this instruction, always ask your healthcare professional. [place] [address] any warranty or liability for your use of this information. Discharge Instructions - Lumbar Fusion    Incision  Please have someone check your incisions daily. If any of the following should occur, please call the office:   Drainage from incision site   Opening of incisions   Fevers greater than 101 degrees   Flu-like symptoms   Increased redness and/or tenderness  If you have staples or sutures instead of tape on your incision, they may be removed 10-14 days following your surgery. This may be done by a visiting nurse, rehab physician, or at your first follow up visit at our office. Otherwise, if your wound is covered with tape strips, they will typically fall of with showering. Brace  If a brace is prescribed, wear it at all times except for sleeping and showering. Always wear a t-shirt under your brace so that it is not directly in contact with your bare skin. The brace may cause you to sweat and you may feel warm. This can irritate your skin so pay special attention to the incision. Showering  If your surgeon allows, you may shower as normal once the large, bulky bandages are removed from your incision. If they are not removed before your discharge from the hospital, you may remove them 36-48 hours after your surgery.  Hair washing is permissible while in the shower. No tub baths, hot tubs or whirlpools until seen in the office. You may remove your brace for showering. Exercise  Lift only objects weighing less than 10-15 lbs. Do not bend or twist at the waist. Always bend with your knees! Limit your sitting to 20-30 minute intervals. You should lie down or walk in between sitting periods. There are no limitations for sitting in a recliner chair. Walk as much as possible and let discomfort. Pain  Take pain medicine as prescribed. As your pain level decreases, you may begin to take over-the-counter Extra-Strength Tylenol. Do NOT take any anti-inflammatory (Advil, Aleve, Motrin) medications for 10 weeks after surgery. Do not resume taking Fosamax for eight weeks after your fusion surgery. Driving  You may NOT drive a car until told otherwise by your physician. You may be a passenger for short distances (about 20-30 minutes). If you must take a longer trip, be sure to make several pit stops so that you can walk and stretch your legs. Reclining in the passenger seat seems to be the most comfortable position for most patients. Follow-Up Appointments  When you are discharged from the hospital, a follow up appointment will be made for 2-3 weeks from your surgery date. Call [phone] to confirm your appointment. If you have additional questions or concerns, call our office ([id number] [facility]) [phone].

## 2017-08-12 NOTE — PROGRESS NOTES
Patient admitted to floor/room 910. Oriented to room. Duel skin assessment completed with Jeison Grewal RN. No breakdown noted. Back brace in place. Dressing to spin intact, dry, and clean. Lung sounds clear. S1S2, bowel sounds active. Up to recliner. Tolerated well. No verbalized needs made known at present time. Bed placed in low locked position with side rails x 2. Call light, telephone, and remote placed within reach. Data base completed by Jeison Grewal RN. Assessment completed. No other verbalized needs made known upon assessment. See doc flow sheet for further assessments.

## 2017-08-12 NOTE — PROGRESS NOTES
Problem: Mobility Impaired (Adult and Pediatric)  Goal: *Acute Goals and Plan of Care (Insert Text)  LongTerm Goals:  1. Mr. Lo Dumont will move from supine to sit and sit to supine in flat bed via log roll with CONTACT GUARD ASSIST within 7 day(s). 2. Mr. Lo Dumont will transfer from sit to stand and stand to sit with CONTACT GUARD ASSIST within 7 days. 3. Mr. Lo Dumont will ambulate with CONTACT GUARD ASSIST for 100+ feet with the least restrictive device within 7 day(s). 4. Mr. Lo Dumont will verbalize 3/3 spinal precautions to ensure back safety during functional activities within 7 days. ________________________________________________________________________      PHYSICAL THERAPY: Daily Note, Treatment Day: 3rd and PM 8/12/2017  INPATIENT: Hospital Day: 5  Payor: BLUE CROSS / Plan: [place] [employer] [facility] Noun / Product Type: PPO /    Brace on when OOB  NAME/AGE/GENDER: Gopal Hunter is a 63 y.o. male       PRIMARY DIAGNOSIS: Lumbar spinal stenosis [M48.06]  Spondylolisthesis, unspecified spinal region [M43.10]  Foot drop, bilateral [M21.371, M21.372] Spinal stenosis of lumbar region Spinal stenosis of lumbar region  Procedure(s) (LRB):  L3-L5 LAMI FUSION TLIF (N/A)  4 Days Post-Op  ICD-10: Treatment Diagnosis:       · Difficulty in walking, Not elsewhere classified (R26.2)  · Other abnormalities of gait and mobility (R26.89)  · Low Back Pain (M54.5)   Precaution/Allergies:  Adhesive       ASSESSMENT:      Mr. Lo Dumont was in chair from AM treatment on contact. Min assist needed to stand from low seat. Brace on. Pt ambulated ~ 76' this PM with RW and SBA to CGA. Gt was a little smoother as pt was moving walker and stepping at the same time better. Returned to chair in room. Improving and moving toward goals by increased ambulation distance and requiring less assistance. Rehab at d/c needed. This section established at most recent assessment   PROBLEM LIST (Impairments causing functional limitations):  1.  Decreased Strength  2. Decreased ADL/Functional Activities  3. Decreased Transfer Abilities  4. Decreased Ambulation Ability/Technique  5. Decreased Balance  6. Increased Pain  7. Decreased Activity Tolerance  8. Decreased Knowledge of Precautions  9. Decreased Brule with Home Exercise Program    INTERVENTIONS PLANNED: (Benefits and precautions of physical therapy have been discussed with the patient.)  1. Balance Exercise  2. Bed Mobility  3. Gait Training  4. Therapeutic Activites  5. Therapeutic Exercise/Strengthening  6. Transfer Training  7. education  8. Group Therapy      TREATMENT PLAN: Frequency/Duration: twice daily for duration of hospital stay  Rehabilitation Potential For Stated Goals: EXCELLENT      RECOMMENDED REHABILITATION/EQUIPMENT: (at time of discharge pending progress): Continue Skilled Therapy and Rehab. HISTORY:   History of Present Injury/Illness (Reason for Referral):  Per MD note, \"This is a very pleasant 63year old patient who presents with a 2 week history of acute onset of bilateral leg pain and foot drop after a fall last week. He was on vacation and suffered to back-to-back falls. He then noticed that he had difficulty walking after these falls. He had tingling that started in his leg and now he cannot lift either foot because of acute foot drop. He was referred for further evaluation of his lumbar spine by Dr. Jessy Wills. MRI of the lumbar spine reveals severe spinal stenosis at L3-4 with lateral recess stenosis as well. At L4-5 there is a grade 1 anterolisthesis severe facet arthropathy and mild central and some lateral recess stenosis primarily on the left. There is degenerative changes and moderate left L5 lateral recess and foraminal stenosis at L5-S1. Patient is in a wheelchair today. He has difficulty walking any distance. He has pain in the back and radiates down each leg. He rates his pain a 10/10. He denies any change in bowel or bladder function. Diclofenac, Norco.  Patient takes daily Coumadin due to a history of 3 DVTs and a pulmonary embolism. He underwent a lobectomy due to aspirate was infection and his previous pulmonary embolism. He has come off of his Coumadin the past for surgery but has to be on a Lovenox bridge. His Coumadin as prescribed by Dr. Luis Carlos Pham, his primary care physician. He is currently completing a prednisone pack. \"  Past Medical History/Comorbidities:   Mr. Grant Weber  has a past medical history of Arthritis; Cellulitis and abscess of trunk (December, 2009); Chronic pain; Depression; Ex-smoker for more than 1 year; Hypertension; Insomnia; PE (pulmonary thromboembolism) ([facility] [address] (2007); Renal failure (12/10/2009); S/P lobectomy of lung; and Thromboembolus ([facility] [address] (2009, 2011). He also has no past medical history of Coagulation defects; COPD; Other ill-defined conditions; or Unspecified adverse effect of anesthesia. Mr. Grant Weber  has a past surgical history that includes hip replacement (2007); thoracotomy (December, 14, 2009); lobectomy (12/01/2009); chest surgery procedure unlisted; vascular surgery procedure unlist (2009); and gi (April, 2008). Social History/Living Environment:   Home Environment: Trailer/mobile home  # Steps to Enter: 2  Rails to [employer]: No  One/Two Story Residence: One story  Living Alone: Yes  Support Systems: Spouse/Significant Other/Partner, Friends \ neighbors, Family member(s), Child(bee)  Patient Expects to be Discharged to[de-identified] Rehabilitation facility  Current DME Used/Available at Home: Walker  Tub or Shower Type: Shower  Prior Level of Function/Work/Activity:  Lives alone, works, drives. Patient has been ambulating with RW recently due to hip pain and needing a EJ. Number of Personal Factors/Comorbidities that affect the Plan of Care: 3+: HIGH COMPLEXITY   EXAMINATION:   Most Recent Physical Functioning:   Gross Assessment:                  Posture:  Posture (WDL): Exceptions to WDL  Posture Assessment:  Forward head  Balance:  Sitting: Intact  Sitting - Static: Good (unsupported)  Sitting - Dynamic: Good (unsupported)  Standing: Impaired  Standing - Static: Fair  Standing - Dynamic : Fair Bed Mobility:     Wheelchair Mobility:     Transfers:  Sit to Stand: Minimum assistance  Stand to Sit: Contact guard assistance  Gait:     Speed/Lillie: Delayed; Shuffled; Slow  Step Length: Left shortened;Right shortened  Gait Abnormalities: Decreased step clearance;Shuffling gait  Distance (ft): 75 Feet (ft)  Assistive Device: Walker, rolling  Ambulation - Level of Assistance: Contact guard assistance       Body Structures Involved:  1. Nerves  2. Bones  3. Joints  4. Muscles  5. Ligaments Body Functions Affected:  1. Sensory/Pain  2. Neuromusculoskeletal  3. Movement Related  4. Skin Related Activities and Participation Affected:  1. Mobility  2. Self Care  3. Domestic Life   Number of elements that affect the Plan of Care: 4+: HIGH COMPLEXITY   CLINICAL PRESENTATION:   Presentation: Evolving clinical presentation with changing clinical characteristics: MODERATE COMPLEXITY   CLINICAL DECISION MAKIN [facility] Inpatient Short Form  How much difficulty does the patient currently have. .. Unable A Lot A Little None   1. Turning over in bed (including adjusting bedclothes, sheets and blankets)? [ ] 1   [X] 2   [ ] 3   [ ] 4   2. Sitting down on and standing up from a chair with arms ( e.g., wheelchair, bedside commode, etc.)   [ ] 1   [ ] 2   [X] 3   [ ] 4   3. Moving from lying on back to sitting on the side of the bed? [ ] 1   [X] 2   [ ] 3   [ ] 4   How much help from another person does the patient currently need. .. Total A Lot A Little None   4. Moving to and from a bed to a chair (including a wheelchair)? [ ] 1   [ ] 2   [X] 3   [ ] 4   5. Need to walk in hospital room? [ ] 1   [ ] 2   [X] 3   [ ] 4   6. Climbing 3-5 steps with a railing?    [ ] 1   [X] 2   [ ] 3   [ ] 4   © 2007, Trustees of [address] [place] [address] [address] [address], under license to [employer]. All rights reserved    Score:  Initial: 15 Most Recent: X (Date: -- )     Interpretation of Tool:  Represents activities that are increasingly more difficult (i.e. Bed mobility, Transfers, Gait). Score 24 23 22-20 19-15 14-10 9-7 6       Modifier CH CI CJ CK CL CM CN         · Mobility - Walking and Moving Around:               - CURRENT STATUS:    CK - 40%-59% impaired, limited or restricted               - GOAL STATUS:           CJ - 20%-39% impaired, limited or restricted               - D/C STATUS:                       ---------------To be determined---------------  Payor: BLUE CROSS / Plan: [place]UE [place]OSS [facility] / Product Type: PPO /       Medical Necessity:     · Patient is expected to demonstrate progress in strength, range of motion, balance, coordination and functional technique to increase independence with   and improve safety during all functional mobility. Reason for Services/Other Comments:  · Patient continues to require skilled intervention due to medical complications and decline in functional mobiltiy. Use of outcome tool(s) and clinical judgement create a POC that gives a: Questionable prediction of patient's progress: MODERATE COMPLEXITY                 TREATMENT:   (In addition to Assessment/Re-Assessment sessions the following treatments were rendered)   Pre-treatment Symptoms/Complaints:  No complaints   Pain: Initial:   Pain Intensity 1: 6  Post Session:  6/10      Therapeutic Activity: (    15 minutes): Therapeutic activities including Chair transfersand Ambulation on level ground and instruction in spine precautions  to improve mobility, strength and balance. Required minimal cueing   to promote correct body mechanics with all mobility.           Date:  8-11-17 AM Date:  8-11-17 PM Date:  8/12/17 AM   Activity/Exercise Parameters Parameters Parameters   Seated heel raises/toe raises x15 x15 X 15 B   Seated LAQ x15 x15 X 15 B   Seated marching x15 x15 X 15 B   Seated hip abduction x15  X 15 B                       Braces/Orthotics/Lines/Etc:   · back brace on while up  Treatment/Session Assessment:    · Response to Treatment:  See above. · Interdisciplinary Collaboration:  · Physical Therapy Assistant and Registered Nurse  · After treatment position/precautions:  · Up in chair, Bed/Chair-wheels locked, Call light within reach and RN notified  · Compliance with Program/Exercises: compliant all of the time. · Recommendations/Intent for next treatment session: \"Next visit will focus on advancements to more challenging activities and reduction in assistance provided\".   Total Treatment Duration:PT Patient Time In/Time Out  Time In: 1315  Time Out: [address] [place], [facility]

## 2017-08-12 NOTE — PROGRESS NOTES
Bedside and Verbal shift change report given to Garret Whitmore (oncoming nurse) by Cyndy ATKINSON (offgoing nurse). Report included the following information Kardex, OR Summary, Intake/Output, MAR and Recent Results.

## 2017-08-12 NOTE — IP AVS SNAPSHOT
Shreya Blanchard 
 
 
 [address] [facility] [address] [facility] 
[phone] Patient: Carol Sr 
MRN: [id number] :[id number] Current Discharge Medication List  
  
START taking these medications Dose & Instructions Dispensing Information Comments Morning Noon Evening Bedtime  
 cyclobenzaprine 10 mg tablet Commonly known as:  FLEXERIL Your last dose was: Your next dose is:    
   
   
 Dose:  5 mg Take 0.5 Tabs by mouth three (3) times daily as needed for Muscle Spasm(s). Quantity:  60 Tab Refills:  0  
     
   
   
   
  
 oxyCODONE IR 5 mg immediate release tablet Commonly known as:  Mary Hernandez Your last dose was: Your next dose is:    
   
   
 Dose:  5-15 mg Take 1-3 Tabs by mouth every four (4) hours as needed. Max Daily Amount: 90 mg. Quantity:  80 Tab Refills:  0 CONTINUE these medications which have CHANGED Dose & Instructions Dispensing Information Comments Morning Noon Evening Bedtime * BYSTOLIC 5 mg tablet Generic drug:  nebivolol What changed:  Another medication with the same name was added. Make sure you understand how and when to take each. Your last dose was: Your next dose is:    
   
   
 Dose:  5 mg Take 5 mg by mouth every morning. Indications: HYPERTENSION Refills:  0  
     
   
   
   
  
 * nebivolol 5 mg tablet Commonly known as:  BYSTOLIC What changed: You were already taking a medication with the same name, and this prescription was added. Make sure you understand how and when to take each. Your last dose was: Your next dose is:    
   
   
 Dose:  5 mg Take 1 Tab by mouth daily. Quantity:  30 Tab Refills:  6  
     
   
   
   
  
 * traZODone 50 mg tablet Commonly known as:  Leah Blackburn What changed:  Another medication with the same name was added. Make sure you understand how and when to take each. Your last dose was: Your next dose is:    
   
   
 Dose:  25-50 mg Take 25-50 mg by mouth nightly as needed for Sleep. Refills:  0  
     
   
   
   
  
 * traZODone 50 mg tablet Commonly known as:  Aileen Ham What changed: You were already taking a medication with the same name, and this prescription was added. Make sure you understand how and when to take each. Your last dose was: Your next dose is:    
   
   
 Dose:  50 mg Take 1 Tab by mouth nightly as needed for Sleep. Quantity:  30 Tab Refills:  6  
     
   
   
   
  
 * warfarin 10 mg tablet Commonly known as:  COUMADIN What changed:  Another medication with the same name was added. Make sure you understand how and when to take each. Your last dose was: Your next dose is:    
   
   
 Dose:  1 Tab Take 1 Tab by mouth daily. Rx Dr Jhoan Landry PCP--- hx of multiple blood clots and PE following hip replacement (2007) -- Hold for 5 days prior to surgery with permission from Dr Jhoan Landry-- last dose 7/31/17 Refills:  0  
     
   
   
   
  
 * warfarin 10 mg tablet Commonly known as:  COUMADIN What changed: You were already taking a medication with the same name, and this prescription was added. Make sure you understand how and when to take each. Your last dose was: Your next dose is:    
   
   
 Dose:  10 mg Take 1 Tab by mouth nightly. Quantity:  30 Tab Refills:  3  
     
   
   
   
  
 * Notice: This list has 6 medication(s) that are the same as other medications prescribed for you. Read the directions carefully, and ask your doctor or other care provider to review them with you. CONTINUE these medications which have NOT CHANGED Dose & Instructions Dispensing Information Comments Morning Noon Evening Bedtime DEPO-TESTOSTERONE 200 mg/mL injection Generic drug:  testosterone cypionate Your last dose was: Your next dose is: by IntraMUSCular route every fourteen (14) days. Refills:  0 STOP taking these medications HYDROcodone-acetaminophen  mg tablet Commonly known as:  NORCO  
   
  
 LOVENOX 40 mg/0.4 mL Generic drug:  enoxaparin  
   
  
 predniSONE 20 mg tablet Commonly known as:  Jr Mon Where to Get Your Medications Information on where to get these meds will be given to you by the nurse or doctor. ! Ask your nurse or doctor about these medications  
  cyclobenzaprine 10 mg tablet  
 nebivolol 5 mg tablet  
 oxyCODONE IR 5 mg immediate release tablet  
 traZODone 50 mg tablet  
 warfarin 10 mg tablet

## 2017-08-12 NOTE — PROGRESS NOTES
Problem: Mobility Impaired (Adult and Pediatric)  Goal: *Acute Goals and Plan of Care (Insert Text)  LongTerm Goals:  1. Mr. Bertrand Wells will move from supine to sit and sit to supine in flat bed via log roll with CONTACT GUARD ASSIST within 7 day(s). 2. Mr. Bertrand Wells will transfer from sit to stand and stand to sit with CONTACT GUARD ASSIST within 7 days. 3. Mr. Bertrand Wells will ambulate with CONTACT GUARD ASSIST for 100+ feet with the least restrictive device within 7 day(s). 4. Mr. Bertrand Wells will verbalize 3/3 spinal precautions to ensure back safety during functional activities within 7 days. ________________________________________________________________________      PHYSICAL THERAPY: Daily Note, Treatment Day: 3rd and AM 8/12/2017  INPATIENT: Hospital Day: 5  Payor: Alpa [place] / Plan: [place] [facility] / Product Type: PPO /    Brace on when OOB  NAME/AGE/GENDER: Victor Manuel Gray is a 63 y.o. male       PRIMARY DIAGNOSIS: Lumbar spinal stenosis [M48.06]  Spondylolisthesis, unspecified spinal region [M43.10]  Foot drop, bilateral [M21.371, M21.372] Spinal stenosis of lumbar region Spinal stenosis of lumbar region  Procedure(s) (LRB):  L3-L5 LAMI FUSION TLIF (N/A)  4 Days Post-Op  ICD-10: Treatment Diagnosis:       · Difficulty in walking, Not elsewhere classified (R26.2)  · Other abnormalities of gait and mobility (R26.89)  · Low Back Pain (M54.5)   Precaution/Allergies:  Adhesive       ASSESSMENT:      Mr. Bertrand Wells was supine on contact. Rated pain at 6/10. He performed log rolling to sit to EOB. Bed was elevated and pt stood with min assist. Brace was donned with min assist and pt amb 40 with rolling walker with antalgic gt and slow short steps. He returned to chair in room and performed bilateral LE ex as listed below. Left in chair with needs in reach. Increased ambulation distance and required less assistance. Rehab at d/c needed.         This section established at most recent assessment   PROBLEM LIST (Impairments causing functional limitations):  1. Decreased Strength  2. Decreased ADL/Functional Activities  3. Decreased Transfer Abilities  4. Decreased Ambulation Ability/Technique  5. Decreased Balance  6. Increased Pain  7. Decreased Activity Tolerance  8. Decreased Knowledge of Precautions  9. Decreased [place] with Home Exercise Program    INTERVENTIONS PLANNED: (Benefits and precautions of physical therapy have been discussed with the patient.)  1. Balance Exercise  2. Bed Mobility  3. Gait Training  4. Therapeutic Activites  5. Therapeutic Exercise/Strengthening  6. Transfer Training  7. education  8. Group Therapy      TREATMENT PLAN: Frequency/Duration: twice daily for duration of hospital stay  Rehabilitation Potential For Stated Goals: EXCELLENT      RECOMMENDED REHABILITATION/EQUIPMENT: (at time of discharge pending progress): Continue Skilled Therapy and Rehab. HISTORY:   History of Present Injury/Illness (Reason for Referral):  Per MD note, \"This is a very pleasant 63year old patient who presents with a 2 week history of acute onset of bilateral leg pain and foot drop after a fall last week. He was on vacation and suffered to back-to-back falls. He then noticed that he had difficulty walking after these falls. He had tingling that started in his leg and now he cannot lift either foot because of acute foot drop. He was referred for further evaluation of his lumbar spine by Dr. Jensen. MRI of the lumbar spine reveals severe spinal stenosis at L3-4 with lateral recess stenosis as well. At L4-5 there is a grade 1 anterolisthesis severe facet arthropathy and mild central and some lateral recess stenosis primarily on the left. There is degenerative changes and moderate left L5 lateral recess and foraminal stenosis at L5-S1. Patient is in a wheelchair today. He has difficulty walking any distance. He has pain in the back and radiates down each leg. He rates his pain a 10/10.   He denies any change in bowel or bladder function. Diclofenac, Norco.  Patient takes daily Coumadin due to a history of 3 DVTs and a pulmonary embolism. He underwent a lobectomy due to aspirate was infection and his previous pulmonary embolism. He has come off of his Coumadin the past for surgery but has to be on a Lovenox bridge. His Coumadin as prescribed by Dr. Angela Pradhan, his primary care physician. He is currently completing a prednisone pack. \"  Past Medical History/Comorbidities:   Mr. Rama Winter  has a past medical history of Arthritis; Cellulitis and abscess of trunk (December, 2009); Chronic pain; Depression; Ex-smoker for more than 1 year; Hypertension; Insomnia; PE (pulmonary thromboembolism) ([facility] [address] (2007); Renal failure (12/10/2009); S/P lobectomy of lung; and Thromboembolus ([facility] [address] (2009, 2011). He also has no past medical history of Coagulation defects; COPD; Other ill-defined conditions; or Unspecified adverse effect of anesthesia. Mr. Rama Winter  has a past surgical history that includes hip replacement (2007); thoracotomy (December, 14, 2009); lobectomy (12/01/2009); chest surgery procedure unlisted; vascular surgery procedure unlist (2009); and gi (April, 2008). Social History/Living Environment:   Home Environment: Trailer/mobile home  # Steps to Enter: 2  Rails to [employer]: No  One/Two Story Residence: One story  Living Alone: Yes  Support Systems: Spouse/Significant Other/Partner, Friends \ neighbors, Family member(s), Child(bee)  Patient Expects to be Discharged to[de-identified] Rehabilitation facility  Current DME Used/Available at Home: Walker  Tub or Shower Type: Shower  Prior Level of Function/Work/Activity:  Lives alone, works, drives. Patient has been ambulating with RW recently due to hip pain and needing a EJ.       Number of Personal Factors/Comorbidities that affect the Plan of Care: 3+: HIGH COMPLEXITY   EXAMINATION:   Most Recent Physical Functioning:   Gross Assessment:                  Posture:  Posture (WDL): Exceptions to WDL  Posture Assessment: Forward head  Balance:  Sitting: Intact  Sitting - Static: Good (unsupported)  Sitting - Dynamic: Good (unsupported)  Standing: Impaired  Standing - Static: Fair  Standing - Dynamic : Fair Bed Mobility:     Wheelchair Mobility:     Transfers:  Sit to Stand: Minimum assistance  Stand to Sit: Contact guard assistance  Gait:     Speed/Lillie: Delayed; Shuffled; Slow  Step Length: Left shortened;Right shortened  Gait Abnormalities: Decreased step clearance;Shuffling gait  Distance (ft): 40 Feet (ft)  Assistive Device: Walker, rolling  Ambulation - Level of Assistance: Contact guard assistance       Body Structures Involved:  1. Nerves  2. Bones  3. Joints  4. Muscles  5. Ligaments Body Functions Affected:  1. Sensory/Pain  2. Neuromusculoskeletal  3. Movement Related  4. Skin Related Activities and Participation Affected:  1. Mobility  2. Self Care  3. Domestic Life   Number of elements that affect the Plan of Care: 4+: HIGH COMPLEXITY   CLINICAL PRESENTATION:   Presentation: Evolving clinical presentation with changing clinical characteristics: MODERATE COMPLEXITY   CLINICAL DECISION MAKIN [facility] Inpatient Short Form  How much difficulty does the patient currently have. .. Unable A Lot A Little None   1. Turning over in bed (including adjusting bedclothes, sheets and blankets)? [ ] 1   [X] 2   [ ] 3   [ ] 4   2. Sitting down on and standing up from a chair with arms ( e.g., wheelchair, bedside commode, etc.)   [ ] 1   [ ] 2   [X] 3   [ ] 4   3. Moving from lying on back to sitting on the side of the bed? [ ] 1   [X] 2   [ ] 3   [ ] 4   How much help from another person does the patient currently need. .. Total A Lot A Little None   4. Moving to and from a bed to a chair (including a wheelchair)? [ ] 1   [ ] 2   [X] 3   [ ] 4   5. Need to walk in hospital room? [ ] 1   [ ] 2   [X] 3   [ ] 4   6.   Climbing 3-5 steps with a railing? [ ] 1   [X] 2   [ ] 3   [ ] 4   © 2007, Trustees of [address] [place] [address] [address] [address], under license to [employer]. All rights reserved    Score:  Initial: 15 Most Recent: X (Date: -- )     Interpretation of Tool:  Represents activities that are increasingly more difficult (i.e. Bed mobility, Transfers, Gait). Score 24 23 22-20 19-15 14-10 9-7 6       Modifier CH CI CJ CK CL CM CN         · Mobility - Walking and Moving Around:               - CURRENT STATUS:    CK - 40%-59% impaired, limited or restricted               - GOAL STATUS:           CJ - 20%-39% impaired, limited or restricted               - D/C STATUS:                       ---------------To be determined---------------  Payor: BLUE CROSS / Plan: [place]UE CR[place] [facility] / Product Type: PPO /       Medical Necessity:     · Patient is expected to demonstrate progress in strength, range of motion, balance, coordination and functional technique to increase independence with   and improve safety during all functional mobility. Reason for Services/Other Comments:  · Patient continues to require skilled intervention due to medical complications and decline in functional mobiltiy. Use of outcome tool(s) and clinical judgement create a POC that gives a: Questionable prediction of patient's progress: MODERATE COMPLEXITY                 TREATMENT:   (In addition to Assessment/Re-Assessment sessions the following treatments were rendered)   Pre-treatment Symptoms/Complaints: \"Do you know if I'm leaving today? \"  Pain: Initial:   Pain Intensity 1: 6  Post Session:  7/10      Therapeutic Activity: (    13 minutes): Therapeutic activities including Bed transfers, Chair transfers, Ambulation on level ground and instruction in spine precautions  to improve mobility, strength and balance. Required minimal cueing   to promote correct body mechanics with all mobility.      Therapeutic Exercise: ( 10):  Exercises per grid below to improve mobility, strength, balance, coordination and activity tolerance. Required minimal verbal cues to promote proper body alignment and promote proper body breathing techniques. Progressed repetitions and complexity of movement as indicated. Date:  8-11-17 AM Date:  8-11-17 PM Date:  8/12/17 AM   Activity/Exercise Parameters Parameters Parameters   Seated heel raises/toe raises x15 x15 X 15 B   Seated LAQ x15 x15 X 15 B   Seated marching x15 x15 X 15 B   Seated hip abduction x15  X 15 B                       Braces/Orthotics/Lines/Etc:   · drain Hemovac  and back brace on while up  Treatment/Session Assessment:    · Response to Treatment:  See above. · Interdisciplinary Collaboration:  · Physical Therapy Assistant and Registered Nurse  · After treatment position/precautions:  · Up in chair, Bed/Chair-wheels locked, Call light within reach and RN notified  · Compliance with Program/Exercises: compliant all of the time. · Recommendations/Intent for next treatment session: \"Next visit will focus on advancements to more challenging activities and reduction in assistance provided\".   Total Treatment Duration:PT Patient Time In/Time Out  Time In: 0736  Time Out: 7210 Thelma Bennett PTA

## 2017-08-12 NOTE — PROGRESS NOTES
TRANSFER - IN REPORT:    Verbal report received from Damari Moraes on Lucio Jumper  being received from  stepdown for routine progression of care      Report consisted of patients Situation, Background, Assessment and   Recommendations(SBAR). Information from the following report(s) SBAR and Recent Results was reviewed with the receiving nurse. Opportunity for questions and clarification was provided. Assessment completed upon patients arrival to unit and care assumed.

## 2017-08-12 NOTE — PROGRESS NOTES
Hemovac drain had no drainage in chamber. Removed drain as ordered. No bleeding or hematoma noted to area. Patient tolerated well. Will continue to monitor.

## 2017-08-12 NOTE — PROGRESS NOTES
Problem: Falls - Risk of  Goal: *Absence of Falls  Document Erick Fall Risk and appropriate interventions in the flowsheet. Outcome: Progressing Towards Goal  Fall Risk Interventions:  Mobility Interventions: PT Consult for mobility concerns, PT Consult for assist device competence, Patient to call before getting OOB, Communicate number of staff needed for ambulation/transfer, OT consult for ADLs, Utilize walker, cane, or other assitive device     Mentation Interventions: Evaluate medications/consider consulting pharmacy, Increase mobility, Toileting rounds     Medication Interventions: Patient to call before getting OOB     Elimination Interventions: Call light in reach, Patient to call for help with toileting needs, Urinal in reach     History of Falls Interventions: Consult care management for discharge planning, Evaluate medications/consider consulting pharmacy           Comments:   Bed placed in low locked position with side rail up x 2. Call light placed within reach. Remote, telephone, and call light placed within reach.

## 2017-08-12 NOTE — PROGRESS NOTES
Problem: Falls - Risk of  Goal: *Absence of Falls  Document Erick Fall Risk and appropriate interventions in the flowsheet. Outcome: Progressing Towards Goal  Fall Risk Interventions:  Mobility Interventions: PT Consult for mobility concerns, PT Consult for assist device competence, Patient to call before getting OOB, Communicate number of staff needed for ambulation/transfer, OT consult for ADLs, Utilize walker, cane, or other assitive device     Mentation Interventions: Evaluate medications/consider consulting pharmacy, Increase mobility, Toileting rounds     Medication Interventions: Patient to call before getting OOB     Elimination Interventions: Call light in reach, Patient to call for help with toileting needs, Urinal in reach     History of Falls Interventions: Consult care management for discharge planning, Evaluate medications/consider consulting pharmacy           Comments:    <[employer] type=\"Conversations\" version=\"1\" denyCopy=\"false\" denyMove=\"false\"><Conversations FolderId=\"[id number]\" IsCrossFolder=\"false\"><Item ItemId=\"[id number]=\" ItemType=\"IPM.Conversation\"/></Conversations></[employer]orableObject>

## 2017-08-12 NOTE — PROGRESS NOTES
2017         Post Op day: 4 Days Post-Op Procedure(s) (LRB):  L3-L5 LAMI FUSION TLIF (N/A)      Admit Date: 2017  Admit Diagnosis: Lumbar spinal stenosis [M48.06]  Spondylolisthesis, unspecified spinal region [M43.10]  Foot drop, bilateral [M21.371, M21.372]       Principle Problem: Spinal stenosis of lumbar region. Subjective: Doing well, No complaints, No SOB, No Chest Pain, No Nausea or Vomiting     Objective:   Vital Signs are Stable, No Acute Distress, Alert and Oriented, Dressing is Dry,  Neurovascular exam is normal.     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    Bronchiectasis (HCC) J47.9    Anemia D64.9    Gastro - esophageal reflux disease     Cellulitis and abscess of trunk L03.319, L02.219    Hydropneumothorax J94.8    Spinal stenosis of lumbar region M48.06    Spondylolisthesis of lumbar region M43.16    Spinal stenosis M48.00    Patient Vitals for the past 8 hrs:   BP Temp Pulse Resp SpO2 Weight   17 0643 127/72 97.7 °F (36.5 °C) 67 18 96 % -   17 0545 - - - - - 109 kg (240 lb 3.2 oz)   17 0341 130/83 98 °F (36.7 °C) 82 16 95 % -    Temp (24hrs), Av.8 °F (36.6 °C), Min:97.4 °F (36.3 °C), Max:98.5 °F (36.9 °C)    Body mass index is 31.69 kg/(m^2).     Lab Results   Component Value Date/Time    HGB 11.8 2017 04:25 AM        Continue PT       Signed By: Eric Garcia MD

## 2017-08-13 LAB
ALBUMIN SERPL BCP-MCNC: 2.6 G/DL (ref 3.5–5)
ALBUMIN/GLOB SERPL: 0.8 {RATIO} (ref 1.2–3.5)
ALP SERPL-CCNC: 49 U/L (ref 50–136)
ALT SERPL-CCNC: 59 U/L (ref 12–65)
ANION GAP BLD CALC-SCNC: 7 MMOL/L (ref 7–16)
AST SERPL W P-5'-P-CCNC: 30 U/L (ref 15–37)
BILIRUB SERPL-MCNC: 1.2 MG/DL (ref 0.2–1.1)
BUN SERPL-MCNC: 11 MG/DL (ref 6–23)
CALCIUM SERPL-MCNC: 8.1 MG/DL (ref 8.3–10.4)
CHLORIDE SERPL-SCNC: 103 MMOL/L (ref 98–107)
CO2 SERPL-SCNC: 30 MMOL/L (ref 21–32)
CREAT SERPL-MCNC: 0.81 MG/DL (ref 0.8–1.5)
ERYTHROCYTE [DISTWIDTH] IN BLOOD BY AUTOMATED COUNT: 14.6 % (ref 11.9–14.6)
GLOBULIN SER CALC-MCNC: 3.2 G/DL (ref 2.3–3.5)
GLUCOSE SERPL-MCNC: 146 MG/DL (ref 65–100)
HCT VFR BLD AUTO: 36.6 % (ref 41.1–50.3)
HGB BLD-MCNC: 12.2 G/DL (ref 13.6–17.2)
INR PPP: 1.9 (ref 0.9–1.2)
MCH RBC QN AUTO: 34.4 PG (ref 26.1–32.9)
MCHC RBC AUTO-ENTMCNC: 33.3 G/DL (ref 31.4–35)
MCV RBC AUTO: 103.1 FL (ref 79.6–97.8)
PLATELET # BLD AUTO: 105 K/UL (ref 150–450)
PMV BLD AUTO: 9 FL (ref 10.8–14.1)
POTASSIUM SERPL-SCNC: 4 MMOL/L (ref 3.5–5.1)
PROT SERPL-MCNC: 5.8 G/DL (ref 6.3–8.2)
PROTHROMBIN TIME: 20.4 SEC (ref 9.6–12)
RBC # BLD AUTO: 3.55 M/UL (ref 4.23–5.67)
SODIUM SERPL-SCNC: 140 MMOL/L (ref 136–145)
WBC # BLD AUTO: 5.7 K/UL (ref 4.3–11.1)

## 2017-08-13 PROCEDURE — 97110 THERAPEUTIC EXERCISES: CPT

## 2017-08-13 PROCEDURE — 85027 COMPLETE CBC AUTOMATED: CPT | Performed by: PHYSICAL MEDICINE & REHABILITATION

## 2017-08-13 PROCEDURE — 74011250637 HC RX REV CODE- 250/637: Performed by: PHYSICAL MEDICINE & REHABILITATION

## 2017-08-13 PROCEDURE — 80053 COMPREHEN METABOLIC PANEL: CPT | Performed by: PHYSICAL MEDICINE & REHABILITATION

## 2017-08-13 PROCEDURE — 97116 GAIT TRAINING THERAPY: CPT

## 2017-08-13 PROCEDURE — 65310000000 HC RM PRIVATE REHAB

## 2017-08-13 PROCEDURE — 74011250636 HC RX REV CODE- 250/636: Performed by: PHYSICAL MEDICINE & REHABILITATION

## 2017-08-13 PROCEDURE — 97162 PT EVAL MOD COMPLEX 30 MIN: CPT

## 2017-08-13 PROCEDURE — 97530 THERAPEUTIC ACTIVITIES: CPT

## 2017-08-13 PROCEDURE — 85610 PROTHROMBIN TIME: CPT | Performed by: PHYSICAL MEDICINE & REHABILITATION

## 2017-08-13 PROCEDURE — 36415 COLL VENOUS BLD VENIPUNCTURE: CPT | Performed by: PHYSICAL MEDICINE & REHABILITATION

## 2017-08-13 RX ORDER — AMOXICILLIN 250 MG
2 CAPSULE ORAL 2 TIMES DAILY
Status: DISCONTINUED | OUTPATIENT
Start: 2017-08-13 | End: 2017-08-30 | Stop reason: HOSPADM

## 2017-08-13 RX ORDER — FAMOTIDINE 20 MG/1
20 TABLET, FILM COATED ORAL 2 TIMES DAILY
Status: DISCONTINUED | OUTPATIENT
Start: 2017-08-13 | End: 2017-08-30 | Stop reason: HOSPADM

## 2017-08-13 RX ORDER — FACIAL-BODY WIPES
10 EACH TOPICAL DAILY PRN
Status: DISCONTINUED | OUTPATIENT
Start: 2017-08-13 | End: 2017-08-30 | Stop reason: HOSPADM

## 2017-08-13 RX ORDER — MAG HYDROX/ALUMINUM HYD/SIMETH 200-200-20
30 SUSPENSION, ORAL (FINAL DOSE FORM) ORAL
Status: DISCONTINUED | OUTPATIENT
Start: 2017-08-13 | End: 2017-08-30 | Stop reason: HOSPADM

## 2017-08-13 RX ORDER — PREDNISONE 10 MG/1
10 TABLET ORAL
Status: DISCONTINUED | OUTPATIENT
Start: 2017-08-14 | End: 2017-08-14

## 2017-08-13 RX ORDER — BISACODYL 5 MG
5 TABLET, DELAYED RELEASE (ENTERIC COATED) ORAL DAILY PRN
Status: DISCONTINUED | OUTPATIENT
Start: 2017-08-13 | End: 2017-08-30 | Stop reason: HOSPADM

## 2017-08-13 RX ORDER — TRAZODONE HYDROCHLORIDE 50 MG/1
50 TABLET ORAL
Status: DISCONTINUED | OUTPATIENT
Start: 2017-08-13 | End: 2017-08-30 | Stop reason: HOSPADM

## 2017-08-13 RX ORDER — ADHESIVE BANDAGE
30 BANDAGE TOPICAL DAILY PRN
Status: DISCONTINUED | OUTPATIENT
Start: 2017-08-13 | End: 2017-08-30 | Stop reason: HOSPADM

## 2017-08-13 RX ORDER — CYCLOBENZAPRINE HCL 10 MG
5 TABLET ORAL
Status: DISCONTINUED | OUTPATIENT
Start: 2017-08-13 | End: 2017-08-30 | Stop reason: HOSPADM

## 2017-08-13 RX ORDER — PREDNISONE 5 MG/1
20 TABLET ORAL
Status: DISCONTINUED | OUTPATIENT
Start: 2017-08-13 | End: 2017-08-13

## 2017-08-13 RX ORDER — TESTOSTERONE ENANTHATE 200 MG/ML
200 VIAL (ML) INTRAMUSCULAR
Status: DISCONTINUED | OUTPATIENT
Start: 2017-08-18 | End: 2017-08-30 | Stop reason: HOSPADM

## 2017-08-13 RX ORDER — OXYCODONE HYDROCHLORIDE 5 MG/1
5-15 TABLET ORAL
Status: DISCONTINUED | OUTPATIENT
Start: 2017-08-13 | End: 2017-08-30 | Stop reason: HOSPADM

## 2017-08-13 RX ADMIN — OXYCODONE HYDROCHLORIDE 15 MG: 5 TABLET ORAL at 20:32

## 2017-08-13 RX ADMIN — OXYCODONE HYDROCHLORIDE 15 MG: 5 TABLET ORAL at 15:48

## 2017-08-13 RX ADMIN — OXYCODONE HYDROCHLORIDE 15 MG: 5 TABLET ORAL at 10:14

## 2017-08-13 RX ADMIN — STANDARDIZED SENNA CONCENTRATE AND DOCUSATE SODIUM 2 TABLET: 8.6; 5 TABLET, FILM COATED ORAL at 17:16

## 2017-08-13 RX ADMIN — HYDROCODONE BITARTRATE AND ACETAMINOPHEN 2 TABLET: 5; 325 TABLET ORAL at 06:25

## 2017-08-13 RX ADMIN — WARFARIN SODIUM 10 MG: 10 TABLET ORAL at 20:32

## 2017-08-13 RX ADMIN — FAMOTIDINE 20 MG: 20 TABLET ORAL at 10:16

## 2017-08-13 RX ADMIN — STANDARDIZED SENNA CONCENTRATE AND DOCUSATE SODIUM 1 TABLET: 8.6; 5 TABLET, FILM COATED ORAL at 08:47

## 2017-08-13 RX ADMIN — TRAZODONE HYDROCHLORIDE 50 MG: 50 TABLET ORAL at 20:31

## 2017-08-13 RX ADMIN — ENOXAPARIN SODIUM 40 MG: 40 INJECTION SUBCUTANEOUS at 08:48

## 2017-08-13 RX ADMIN — FAMOTIDINE 20 MG: 20 TABLET ORAL at 17:17

## 2017-08-13 RX ADMIN — NEBIVOLOL HYDROCHLORIDE 5 MG: 5 TABLET ORAL at 08:47

## 2017-08-13 NOTE — PROGRESS NOTES
Assessment completed, respiration even and unlabored, instructed to call for needs or assist , educated on risk and needs not to get up without assist, call light in reach, pt denies any c/o at present, dressing to midline lower back c/d/i, pt voids continent in urinal and staff will empty

## 2017-08-13 NOTE — PROGRESS NOTES
PHYSICAL THERAPY EXAMINATION  TIME  [address]  Patient Name: Buzz Mcallister  Patient Age: 63 y.o. Past Medical History:   Past Medical History:   Diagnosis Date    Arthritis     multiple joints    Cellulitis and abscess of trunk December, 2009    Involving thorocotomy incision.  Chronic pain     OA- left hip    Depression     now controlled without meds    Ex-smoker for more than 1 year     Quit ~9/16/1994  1 pk/day 15 yrs    Hypertension     controlled with Bystolic    Insomnia     treated with prn Trazadone    PE (pulmonary thromboembolism) ([facility] [place] [address] 2007    PE following right hip arthroplasty    Renal failure 12/10/2009    S/P lobectomy of lung     RLL    Thromboembolus ([facility] [place] [address] 2009, 2011    left leg       Medical Diagnosis:  Post Spinal Surgery Rehab  Spinal stenosis of lumbar region <principal problem not specified>    Precautions at Admission: Spinal (back brace on when out of bed, fall precautions)    Therapy Diagnosis:   Difficulty with bed mobility  [x]     Difficulty with functional transfers  [x]     Difficulty with ambulation  [x]     Difficulty with stair negotiations  [x]       Problem List:    Decreased strength B LE  [x]     Decreased strength trunk/core  [x]     Decreased AROM   [x]     Decreased PROM  [x]    Decreased endurance  [x]     Decreased balance sitting  [x]     Decreased balance standing  [x]     Pain   [x]     Slow ambulation velocity  [x]    Decreased coordination  [x]    Decreased safety awareness  []      Functional Limitations:   Decreased independence with bed mobility  [x]     Decreased independence with functional transfers  [x]     Decreased independence with ambulation  [x]     Decreased independence with stair negotiation  [x]       Previous Functional Level: Patient reporting he was ambulating independently inside and outside of home with SW for about 3 weeks. Prior to that ambulating indepednently without a device.  Reports ambulating with a limp due to left hip and was due to left hip replacement in August this year. Reports history of right RTC repair and right hip replacement    Home Environment: Home Environment: Trailer/mobile home, patient reporting he is going to his girlfriends home at [place]/C which is one story with 2 steps with rails to get into home. Reports he will go home in a [employer]  # Steps to Enter: 2  Rails to Enter: Yes  Hand Rails : Bilateral  Wheelchair Ramp: No  One/Two Story Residence: One story  Living Alone: Yes  Support Systems: Spouse/Significant Other/Partner, Friends \ neighbors, Family member(s), Child(bee)  Patient Expects to be Discharged to[de-identified] Trailer/mobile home  Current DME Used/Available at Home: Derenda Abad or Shower Type: Shower (shower curtain)         Outcome Measures: Vital Signs:  Patient Vitals for the past 12 hrs:   Temp Pulse Resp BP SpO2   08/13/17 0700 97.9 °F (36.6 °C) 63 17 119/69 98 %     Pain level:2 to 6 out of 10  Pain location:low back  Pain interventions:Pain medication, rest, positioning,body mechanics    Patient education:PT POC and goals, body mechanics,how to Don/Doff LSO and purpose of LSO, Bed mobility training,transfer training, gait training, fall precautions, activity pacing, spinal precautions, w/c mobility and parts management. Patient verbalizing understanding and demonstrating partial understanding of patient education. Recommend follow up education.       Interdisciplinary Communication:spoke with RN regarding pain medication and functional status     MMT Initial Asssessment   Right Lower Extremity Left Lower Extremity   Hip Flexion 3- 3-   Knee Extension 4 5   Knee Flexion 4- 4   Ankle Dorsiflexion 2- 3-   0/5 No palpable muscle contraction  1/5 Palpable muscle contraction, no joint movement  2-/5 Less than full range of motion in gravity eliminated position  2/5 Able to complete full range of motion in gravity eliminated position  2+/5 Able to initiate movement against gravity  3-/5 More than half but not full range of motion against gravity  3/5 Able to complete full range of motion against gravity  3+/5 Completes full range of motion against gravity with minimal resistance  4-/5 Completes full range of motion against gravity with minimal-moderate resistance  4/5 Completes full range of motion against gravity with moderate resistance  4+/5 Completes full range of motion against gravity with moderate-maximum resistance  5/5 Completes full range of motion against gravity with maximum resistance     AROM: bilateral hip flex 60, ABD 10, knee WFL, left ankle 5 degrees DF, right ankle -5 due to weakness, PROM for ankle DF [facility]    FIM SCORES Initial Assessment   Bed/Chair/Wheelchair Transfers 3   Wheelchair Mobility 4   Walking [place] 2   Steps/Stairs  (NT)   PRIMARY MODE OF LOCOMOTION: ambulation  Please see IRC Interdisciplinary Eval: Coordination/Balance Section for details regarding FIM score description. BED/CHAIR/WHEELCHAIR TRANSFERS Initial Assessment   Rolling Right 4 (Minimal assistance)   Rolling Left 4 (Minimal assistance)   Supine to Sit 4 (Minimal assistance)   Sit to Stand Minimal assistance   Sit to Supine 4 (Minimal assistance)   Transfer Assist Score 3   Transfer Type SPT with walker (assist with sit to stand and stand to sit)   Comments Increased time and effort with cues for body mechanics for log rolling and supine<>sidelying<>sit<>stand. Patient using bed rail for rolling and sidelying<>sit.  Increased back pain during bed mobility and transfers   Car Transfer Not tested   Car Type         WHEELCHAIR MOBILITY/MANAGEMENT Initial Assessment   Able to Propel 150 feet   Functional Level 4   Curbs/ramps assistance required 0 (Not tested)   Wheelchair set up assistance required 3 (Moderate assistance)   Wheelchair management Manages left brake, Manages right brake       WALKING INDEPENDENCE Initial Assessment   Assistive device Walker, rolling, Gait belt, Orthotic device (LSO) Ambulation assistance - level surface 4 (Contact guard assistance)   Distance 75 Feet (ft) (75ft x 1 w/o DF assist, 25 ft x 1 with ace wrap for DF assist RLE)   Functional Level 2   Comments slow cont partial step through gait pattern with flexed posture. Steppage gait pattern RLE during swing due to foot drop on right, PF right foot at initial contact, decreased ankle PF and knee flexion bilaterally at terminal stance, decreased ankle DF and knee ext LLE at initial contact. Ambulation assistance - unlevel surface 0 (Not tested)       STEPS/STAIRS Initial Assessment   Steps/Stairs ambulated 0   Rail Use     Functional Level  (NT)   Comments Unable to ambulate up/down steps at this time due to low back pain, impaired balance and decreased strength RLE   Curbs/Ramps 0 (Not tested)   . SUPINE EXERCISES Sets Reps Comments, min assist to complete   Ankle Pumps 1 20 AAROM,passive heel cord stretch   Heel Slides 2 10    Hip Abduction 2 10    Short Arc Quad 2 10        QUALITY INDICATOR ASSIST COMMENTS   Walk 10 feet CGA with RW    Walk 50 feet with 2 turns CGA with RW    Walk 150 feet Unable due to low back pain and RLE weakness    Walk 10 feet on uneven  Unable for reasons as noted above    1 step/curb Unable to ambulate up/down steps due to RLE weakness, low back pain and impaired balance    4 steps As noted above    12 steps As noted above     object Unable due to spinal precautions    Wheel 50' w/2 turns Supervision    Wheel 150' Supervision             PHYSICAL THERAPY PLAN OF CARE    Therapy Diagnosis:   Please see table above    Order received from MD for physical therapy services and chart reviewed. Pt to be seen at least 5 times per week for at least 1.5 hours of physical therapy per day for 7 to 10 days. Thank you for the referral.    LTGs:  LTG 1. Patient log roll and transfer supine<>sidelying<>sit demonstrating correct body mechanics in 7 to 10 days   LTG 2.  Patient transfer sit<>stand and perform SPT with RW demonstrating correct body mechanics in 7 to 10 days   LTG 3. Patient ambulate 250ft with RW, LSO, right DF assist and modified independence on level surfaces in 7 to 10 days   LTG 4. Patient ambulate up/down 4 steps with hand rails,LSO,right DF assist and modified independence in 7 to 10 days    Pt would benefit from skilled physical therapy in order to improve independent functional mobility within the home. Interventions may include range of motion (AROM, PROM B LE/trunk), motor function (B LE/trunk strengthening/coordination), activity tolerance (vitals, oxygen saturation levels), bed mobility training, balance activities, gait training (progressive ambulation program), and functional transfer training. Please see IRC; Interdisciplinary Eval, Care Plan, and Patient Education for further information regarding physical therapy examination and plan of care. Patient return to room at end of treatment and remained up in wheelchair with needs in reach. LSO on    Elisha Donahue, PT  8/13/2017

## 2017-08-13 NOTE — PROGRESS NOTES
Problem: Falls - Risk of  Goal: *Absence of Falls  Document Erick Fall Risk and appropriate interventions in the flowsheet.    Outcome: Progressing Towards Goal  Fall Risk Interventions:  Mobility Interventions: Communicate number of staff needed for ambulation/transfer, PT Consult for mobility concerns, PT Consult for assist device competence, Patient to call before getting OOB, OT consult for ADLs, Utilize walker, cane, or other assitive device, Strengthening exercises (ROM-active/passive)     Mentation Interventions: Adequate sleep, hydration, pain control, Evaluate medications/consider consulting pharmacy, Increase mobility, Toileting rounds     Medication Interventions: Patient to call before getting OOB, Teach patient to arise slowly, Evaluate medications/consider consulting pharmacy     Elimination Interventions: Call light in reach, Toilet paper/wipes in reach, Toileting schedule/hourly rounds, Patient to call for help with toileting needs, Urinal in reach     History of Falls Interventions: Consult care management for discharge planning

## 2017-08-13 NOTE — PROGRESS NOTES
Problem: Falls - Risk of  Goal: *Absence of Falls  Document Erick Fall Risk and appropriate interventions in the flowsheet.    Outcome: Progressing Towards Goal  Fall Risk Interventions:  Mobility Interventions: PT Consult for mobility concerns, PT Consult for assist device competence, Patient to call before getting OOB, Communicate number of staff needed for ambulation/transfer, OT consult for ADLs, Utilize walker, cane, or other assitive device     Mentation Interventions: Evaluate medications/consider consulting pharmacy, Increase mobility, Toileting rounds     Medication Interventions: Patient to call before getting OOB     Elimination Interventions: Call light in reach, Patient to call for help with toileting needs, Urinal in reach     History of Falls Interventions: Consult care management for discharge planning, Evaluate medications/consider consulting pharmacy

## 2017-08-13 NOTE — H&P
Physical Medicine and Rehabilitation History and Physical       Admit Date: 8/12/2017  Orthopedic Surgeon: Dr. Jarred Menon  Primary Care Physician: Edita Patiño MD  Chief Complaint: gait dysfunction secondary to below  Admission Diagnosis: lumbar spinal stenosis  Recurrent DVTs, Pulmonary Embolism requiring long term anticoagulation  HTN  Arthritis  S/P RLL lobectomy   R hip arthroplasty  Chronic pain  Insomnia  H/o renal failure in 2009  Rehabilitation Diagnosis:  Mobility deficits  Self care deficits  Dysequilibrium  Acute on chronic pain    History of Present Illness/ Hospital course: This is a 63 y.o. male with PMH of recurrent DVTs, PE on chronic coumadin, HTN, RLL lobectomy who was admitted on 8/6/2017 with a 2 week history of acute onset of bilateral leg pain, foot drop and falls. He was on vacation and having back-to-back falls with progressive difficulty walking. He had tingling  in his legs with profound weakness resulting in the patient unable to lift either foot because of acute foot drop. Lumbar MRI was positive for severe spinal stenosis at L3-4, L4-5 grade 1 anterolisthesis, severe facet arthropathy and L5 - S1 foraminal stenosis. He underwent a bilateral L3 - L5 laminectomy, partial facetectomy, foraminotomy and lumbar interbody fusion with spinal instrumentation on 8/8. Postoperatively, he was limited to wheelchair mobility with difficulty ambulating any distance. Patient has been experiencing severe severe radiating back pain into his legs requiring oral and iv pain management. Patient takes daily Coumadin due to a history of 3 DVTs and a pulmonary embolism.  He underwent a lobectomy due to infectous aspirate with history of previous pulmonary embolism.  His Coumadin as prescribed by Dr. Neal Perez, his primary care physician. Iesha Friend is currently completing a prednisone pack. Physical therapy was initiated and patient was starting to mobilize.  He was noted to decreased mobility, balance and endurance. The medical stability and these recent medical events are not expected to interfere with the patient's ability to tolerate the intensity of services in acute rehab. Current Level of Function: (evaluated by acute therapy staff, with bed mobility, transfers personally observed post-admission in the IRF setting minutes prior to submission of document) bathing - mod A, upper body dressing - mod A, lower body dressing - max A, toileting - max A, bed mobility - mod A, transfers - min A, ambulation 25' with RW and min A. Previous Functional Level/Home Environment:  Patient lives alone in an one level home with 2 steps to enter. PTA, had been ambulating with a RW due to hip pain. Currently working and driving. Review of Systems:  + back pain, + leg weakness. Denies headache,chest pain, shortness of breath, cough, headache, visual problems, abdominal pain,dizziness, dysurea, calf pain. Pertinent positives are as noted in the medical records and unremarkable otherwise. Past Medical History:   Diagnosis Date    Arthritis     multiple joints    Cellulitis and abscess of trunk December, 2009    Involving thorocotomy incision.     Chronic pain     OA- left hip    Depression     now controlled without meds    Ex-smoker for more than 1 year     Quit ~9/16/1994  1 pk/day 15 yrs    Hypertension     controlled with Bystolic    Insomnia     treated with prn Trazadone    PE (pulmonary thromboembolism) ([address] 2007    PE following right hip arthroplasty    Renal failure 12/10/2009    S/P lobectomy of lung     RLL    Thromboembolus ([address] 2009, 2011    left leg      Past Surgical History:   Procedure Laterality Date    CHEST SURGERY PROCEDURE UNLISTED      cleaned out RLL \"aspergillis\"    HX GI  April, 2008    Nissen fundoplication     HX HIP REPLACEMENT  2007    Right hip    HX LOBECTOMY  12/01/2009    RLL lobectomy for mycetoma resection--- and removal of IVC filter    HX THORACOTOMY  December, 14, 2009    Incision and drainage of thoracotomy incision with separate    NEUROLOGICAL PROCEDURE UNLISTED      VASCULAR SURGERY PROCEDURE UNLIST  2009    IVC filter inserted      Allergies   Allergen Reactions    Adhesive Contact Dermatitis      Family History   Problem Relation Age of Onset    Diabetes Father     Heart Disease Father     Hypertension Father     Hypertension Brother     Diabetes Paternal Grandfather     Stroke Paternal Grandfather       Social History   Substance Use Topics    Smoking status: Former Smoker     Packs/day: 1.50     Years: 15.00     Quit date: 9/16/1994    Smokeless tobacco: Never Used    Alcohol use 1.0 oz/week     2 Cans of beer per week      Current Facility-Administered Medications   Medication Dose Route Frequency    oxyCODONE IR (ROXICODONE) tablet 5-15 mg  5-15 mg Oral Q4H PRN    traZODone (DESYREL) tablet 50 mg  50 mg Oral QHS PRN    senna-docusate (PERICOLACE) 8.6-50 mg per tablet 2 Tab  2 Tab Oral BID    predniSONE (DELTASONE) tablet 20 mg  20 mg Oral DAILY WITH BREAKFAST    bisacodyl (DULCOLAX) suppository 10 mg  10 mg Rectal DAILY PRN    bisacodyl (DULCOLAX) tablet 5 mg  5 mg Oral DAILY PRN    cyclobenzaprine (FLEXERIL) tablet 5 mg  5 mg Oral TID PRN    magnesium hydroxide (MILK OF MAGNESIA) 400 mg/5 mL oral suspension 30 mL  30 mL Oral DAILY PRN    alum-mag hydroxide-simeth (MYLANTA) oral suspension 30 mL  30 mL Oral Q4H PRN    [START ON 8/18/2017] testosterone enanthate (DELATESTRYL) injection 200 mg  200 mg IntraMUSCular Q 14 DAYS    enoxaparin (LOVENOX) injection 40 mg  40 mg SubCUTAneous Q24H    ondansetron (ZOFRAN ODT) tablet 4 mg  4 mg Oral Q6H PRN    warfarin (COUMADIN) tablet 10 mg  10 mg Oral QHS    nebivolol (BYSTOLIC) tablet 5 mg  5 mg Oral DAILY       Objective:     Visit Vitals    /69 (BP 1 Location: Right arm, BP Patient Position: At rest)    Pulse 63    Temp 97.9 °F (36.6 °C)    Resp 17    SpO2 98%      Intake and Output:  08/11 1901 - 08/13 0700  In: -   Out: 2700 [Urine:2700]    Physical Exam:       General:  Alert, oriented and mood affect appropriate; resting comfortably in bed   HEENT:  Normocephalic, EOMI, facial symmetry  Intact. Oral mucosa pink and moist. No nasal discharge. Lungs:  CTA Bilaterally, decreased R LL breath sounds. No apparent use of accessory muscles for respiration. No nasal alar flaring. Heart:  Regular rate and rhythm, S1, S2, No obvious murmur, click, rub or gallop. Genitourinary:  deferred   Abdomen:  Soft, non-tender to palpation in all four quadrants. Bowel sounds present. No obvious masses palpated.     Neuromuscular:  Left Upper Extremity MMT:  5/5  Sensation: intact    Right Upper Extremity MMT:  5/5      Sensation: intact    Left Lower Extremity MMT:   Hip Flexion: 3+ - 4-  Knee extension: 5-/5  Ankle Dorsi flexion: 3/5  EHL: 3/5  Ankle Plantar Flexion: 4-/5  Sensation: intact    Right Lower Extremity MMT:  Hip Flexion: 4-/5  Knee extension: 4+/5  Ankle Dorsi flexion 4/5  EHL: 4/5  Ankle Plantar Flexion: 4+/5  Sensation: intact   Skin/extremities:  Intact, dry, good skin turgor, age related changes present, bilateral SRINATH stockings in place, calves soft, no LE edema       Incision:   dressing intact - clean, dry, no marginal erythema, no rashes or lesions      Lab Review:    Recent Results (from the past 72 hour(s))   PROTHROMBIN TIME + INR    Collection Time: 08/10/17  1:30 PM   Result Value Ref Range    Prothrombin time 10.7 9.6 - 12.0 sec    INR 1.0 0.9 - 1.2     CBC W/O DIFF    Collection Time: 08/11/17  4:25 AM   Result Value Ref Range    WBC 7.6 4.3 - 11.1 K/uL    RBC 3.40 (L) 4.23 - 5.67 M/uL    HGB 11.8 (L) 13.6 - 17.2 g/dL    HCT 35.0 (L) 41.1 - 50.3 %    .9 (H) 79.6 - 97.8 FL    MCH 34.7 (H) 26.1 - 32.9 PG    MCHC 33.7 31.4 - 35.0 g/dL    RDW 15.0 (H) 11.9 - 14.6 %    PLATELET 89 (L) 623 - 450 K/uL    MPV 9.3 (L) 10.8 - 14.1 FL   PROTHROMBIN TIME + INR    Collection Time: 08/11/17  6:50 PM   Result Value Ref Range    Prothrombin time 14.9 (H) 9.6 - 12.0 sec    INR 1.4 (H) 0.9 - 1.2     PROTHROMBIN TIME + INR    Collection Time: 08/12/17  4:40 AM   Result Value Ref Range    Prothrombin time 15.2 (H) 9.6 - 12.0 sec    INR 1.4 (H) 0.9 - 1.2       Assessment: This is a 62 YO with 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 He continues with mobility and self care impairments. The Post Assessment Physician Evaluation (GILBERTO) found the current functional status to be comparable with the Pre-admission Screening. The Patient is a good candidate for acute inpatient rehabilitation. Nothing since the Pre-admission screen has changed that determination. Rehabilitation Plan  The patient has shown the ability to tolerate and benefit from 3 hours of therapy daily and is being admitted to a comprehensive acute inpatient rehabilitation program consisting of at least 3 hours of combined physical, occupational and speech therapies. Begin intensive Physical Therapy for a minimum of 1.5 hours a day, at least 5 out of 7 days per week to address bed mobility, transfers, ambulation, strengthening, balance, and endurance. The patient to wear back brace when OOB. Begin intensive Occupational Therapy for a minimum of 1.5 hours a day, at least 5 out of 7 days per week to address ADL (bathing, LE dressing, toileting) safety; energy conservations and adaptive equipment as needed. The patient will also require 24-hour skilled rehabilitation nursing for bowel and bladder management, skin care for decubitus ulcer prevention , pain management and ongoing medication administration     The patient may benefit from a psychology consult for depression, adjustment disorder.     The patient will also require close supervision of a rehabilitation physician for  medical management of complications/comorbidities:  Anticoagulation management - on long term coumadin secondary to recurrent DVTs/PE. Continue coumadin, lovenox bridge. INR- 1.4. Recheck PT/INR. Pharmacy to assist.  Spinal stenosis - will begin to taper prednisone to 20mg every day, begun on prednisone PTA for a few weeks  HTN -  Controlled on bystolic  Bowel mgmt - on scheduled senokot , prn dulcolax, MOM  GI prophylaxis - on pepcid bid  Pain mgmt- advance to oxycodone 5-15mg q4prn, flexeril prn  Insomnia - prn trazodone    The patient's prognosis for significant practical improvement within a reasonable period of time appears good and the estimated length of stay is  14 days and he is expected to return home with girlfriend and continued rehabilitation with outpatient therapy. Given the patient's complex neurologic/medical condition and the risk of further medical complications including: DVT, PE, skin breakdown, pneumonia due to decreased mobility, infection due to ongoing immunosuppressant therpay , CVA, MI, cardiac arrhythmias due to HTN,  Increased energy expenditure in an unilateral amputation with known  spinal stenosis could potentially impact the IRF program with decreased cardiovascular efficiency, postural hypotension, spasticity and neurogenic bowel and bladder. For these ongoing medical issues,rehabilitation services could not be safely provided at a lower level of care such as a skilled nursing facility or nursing home.     Signed By: Lilliam Orozco MD     August 13, 2017

## 2017-08-13 NOTE — PROGRESS NOTES
Warfarin dosing per pharmacist    Naveed Turner is a 63 y.o. male. Indication:  Hx recurrent DVTs and PE    Goal INR:  2.0 - 3.0    Home dose:  10 mg qhs    Risk factors or significant drug interactions:  none    Other anticoagulants:  Enoxaparin ppx    Daily Monitoring  Date  INR     Warfarin dose HGB              Notes  8/9  1.0  10 mg  14.3  8/10  1.0  10 mg  12.7  8/11  1.4  10 mg  11.8  8/12  1.4  10 mg  ---  8/13  1.9  10 mg  12.2    Pharmacy consulted to assist with warfarin dosing on 8/13. His warfarin was being held for surgery on 8/8 and was resumed on 8/9. INR rising appropriately back on home dose of 10 mg qhs. Continue current dosing. Daily INR for now. Continue enoxaparin prophylaxis until INR therapeutic. Pharmacy will continue to follow. Please call with any questions.       Thank you,  Polo Duncan, PharmD  Clinical Pharmacist  [phone]

## 2017-08-13 NOTE — PROGRESS NOTES
Patient resting quietly asleep. Alert and oriented upon assessment. Lung sounds clear in upper lobes, diminished in bases with no air exchange to right lower lobe due to hx of lobectomy. Dressing to spine/back dry, clean, and intact. Back brace at bedside. S1S2, bowel sounds active. No complain of discomfort at present time. Moving all extremities. Repositioning self in bed. Assessment completed. No other voiced needs at present time. See doc flow sheet for further assessments.

## 2017-08-14 LAB
INR PPP: 2.3 (ref 0.9–1.2)
PROTHROMBIN TIME: 24.8 SEC (ref 9.6–12)

## 2017-08-14 PROCEDURE — 74011250637 HC RX REV CODE- 250/637: Performed by: PHYSICAL MEDICINE & REHABILITATION

## 2017-08-14 PROCEDURE — 97530 THERAPEUTIC ACTIVITIES: CPT

## 2017-08-14 PROCEDURE — 36415 COLL VENOUS BLD VENIPUNCTURE: CPT | Performed by: PHYSICAL MEDICINE & REHABILITATION

## 2017-08-14 PROCEDURE — 74011250636 HC RX REV CODE- 250/636: Performed by: PHYSICAL MEDICINE & REHABILITATION

## 2017-08-14 PROCEDURE — 97110 THERAPEUTIC EXERCISES: CPT

## 2017-08-14 PROCEDURE — 85610 PROTHROMBIN TIME: CPT | Performed by: PHYSICAL MEDICINE & REHABILITATION

## 2017-08-14 PROCEDURE — 97166 OT EVAL MOD COMPLEX 45 MIN: CPT

## 2017-08-14 PROCEDURE — 97535 SELF CARE MNGMENT TRAINING: CPT

## 2017-08-14 PROCEDURE — 97116 GAIT TRAINING THERAPY: CPT

## 2017-08-14 PROCEDURE — 77030012893

## 2017-08-14 PROCEDURE — 65310000000 HC RM PRIVATE REHAB

## 2017-08-14 RX ADMIN — FAMOTIDINE 20 MG: 20 TABLET ORAL at 16:46

## 2017-08-14 RX ADMIN — WARFARIN SODIUM 10 MG: 10 TABLET ORAL at 21:57

## 2017-08-14 RX ADMIN — OXYCODONE HYDROCHLORIDE 15 MG: 5 TABLET ORAL at 00:19

## 2017-08-14 RX ADMIN — OXYCODONE HYDROCHLORIDE 10 MG: 5 TABLET ORAL at 16:45

## 2017-08-14 RX ADMIN — NEBIVOLOL HYDROCHLORIDE 5 MG: 5 TABLET ORAL at 08:04

## 2017-08-14 RX ADMIN — OXYCODONE HYDROCHLORIDE 10 MG: 5 TABLET ORAL at 12:39

## 2017-08-14 RX ADMIN — ENOXAPARIN SODIUM 40 MG: 40 INJECTION SUBCUTANEOUS at 08:04

## 2017-08-14 RX ADMIN — FAMOTIDINE 20 MG: 20 TABLET ORAL at 08:04

## 2017-08-14 RX ADMIN — STANDARDIZED SENNA CONCENTRATE AND DOCUSATE SODIUM 2 TABLET: 8.6; 5 TABLET, FILM COATED ORAL at 16:45

## 2017-08-14 RX ADMIN — OXYCODONE HYDROCHLORIDE 15 MG: 5 TABLET ORAL at 07:09

## 2017-08-14 RX ADMIN — STANDARDIZED SENNA CONCENTRATE AND DOCUSATE SODIUM 2 TABLET: 8.6; 5 TABLET, FILM COATED ORAL at 08:04

## 2017-08-14 RX ADMIN — OXYCODONE HYDROCHLORIDE 15 MG: 5 TABLET ORAL at 21:56

## 2017-08-14 NOTE — PROGRESS NOTES
Problem: Falls - Risk of  Goal: *Absence of Falls  Document Erick Fall Risk and appropriate interventions in the flowsheet.    Outcome: Progressing Towards Goal  Fall Risk Interventions:  Mobility Interventions: Patient to call before getting OOB, PT Consult for mobility concerns, Strengthening exercises (ROM-active/passive), Utilize walker, cane, or other assitive device, OT consult for ADLs     Mentation Interventions: Increase mobility, Toileting rounds     Medication Interventions: Patient to call before getting OOB, Teach patient to arise slowly     Elimination Interventions: Call light in reach, Elevated toilet seat, Patient to call for help with toileting needs, Urinal in reach     History of Falls Interventions: Consult care management for discharge planning

## 2017-08-14 NOTE — PROGRESS NOTES
Problem: Falls - Risk of  Goal: *Absence of Falls  Document Erick Fall Risk and appropriate interventions in the flowsheet.    Fall Risk Interventions:  Mobility Interventions: Communicate number of staff needed for ambulation/transfer, PT Consult for mobility concerns, PT Consult for assist device competence, Patient to call before getting OOB, OT consult for ADLs, Utilize walker, cane, or other assitive device, Strengthening exercises (ROM-active/passive)     Mentation Interventions: Adequate sleep, hydration, pain control, Evaluate medications/consider consulting pharmacy, Increase mobility, Toileting rounds     Medication Interventions: Patient to call before getting OOB, Teach patient to arise slowly, Evaluate medications/consider consulting pharmacy     Elimination Interventions: Call light in reach, Toilet paper/wipes in reach, Toileting schedule/hourly rounds, Patient to call for help with toileting needs, Urinal in reach     History of Falls Interventions: Consult care management for discharge planning

## 2017-08-14 NOTE — PROGRESS NOTES
Initial visit by  to convey care and concern and encourage patient that  services are available if desired. No needs were voiced during the visit. Provided business card for future reference.      Lalitha Vargas 70  Board Certified

## 2017-08-14 NOTE — PROGRESS NOTES
Pt resting quietly in bed. C/o pain 8/10 at this time. Administered oxycodone. Alert and oriented x4. Lungs sounds clear bilaterally with respirations even and unlabored. Bowel sounds active in all quadrants. +2 pulses bilaterally in upper and lower extremities. Instructed to call for assistance. Call light in reach. Voiced understanding and identified call light.

## 2017-08-14 NOTE — PROGRESS NOTES
[facility] OCCUPATIONAL THERAPY INITIAL EVALUATION    Time In: 2959  Time Out: 9987    Precautions: Falls and Spinal    Vitals: Patient Vitals for the past 8 hrs:   Temp Pulse Resp BP SpO2   08/14/17 0733 98.8 °F (37.1 °C) 77 16 124/77 94 %         Pain: Patient rated pain 5 out of 10 located at surgical incision at spine. Patient was repositioned with patient reporting no change in pain after intervention. History of Presenting Illness (per previous reports): This is a 63 y. o. male with PMH of recurrent DVTs, PE on chronic coumadin, HTN, RLL lobectomy who was admitted on 8/6/2017 with a 2 week history of acute onset of bilateral leg pain, foot drop and falls. He was on vacation and having back-to-back falls with progressive difficulty walking. He had tingling  in his legs with profound weakness resulting in the patient unable to lift either foot because of acute foot drop. Lumbar MRI was positive for severe spinal stenosis at L3-4, L4-5 grade 1 anterolisthesis, severe facet arthropathy and L5 - S1 foraminal stenosis. He underwent a bilateral L3 - L5 laminectomy, partial facetectomy, foraminotomy and lumbar interbody fusion with spinal instrumentation on 8/8. Postoperatively, he was limited to wheelchair mobility with difficulty ambulating any distance. Patient has been experiencing severe severe radiating back pain into his legs requiring oral and iv pain management. Patient takes daily Coumadin due to a history of 3 DVTs and a pulmonary embolism.  He underwent a lobectomy due to infectous aspirate with history of previous pulmonary embolism.  His Coumadin as prescribed by Dr. Katharine Abdi, his primary care physician. [facility] is currently completing a prednisone pack. Physical therapy was initiated and patient was starting to mobilize. He was noted to decreased mobility, balance and endurance.  The medical stability and these recent medical events are not expected to interfere with the patient's ability to tolerate the intensity of services in acute rehab. Past Medical History/ Co-morbidities:   Past Medical History:   Diagnosis Date    Arthritis     multiple joints    Cellulitis and abscess of trunk December, 2009    Involving thorocotomy incision.  Chronic pain     OA- left hip    Depression     now controlled without meds    Ex-smoker for more than 1 year     Quit ~9/16/1994  1 pk/day 15 yrs    Hypertension     controlled with Bystolic    Insomnia     treated with prn Trazadone    PE (pulmonary thromboembolism) ([facility] [address] 2007    PE following right hip arthroplasty    Renal failure 12/10/2009    S/P lobectomy of lung     RLL    Thromboembolus ([facility] [address] 2009, 2011    left leg       Patient's Goal: Patient reporting he wants to be able to get back to what he was doing before this happened    Previous Level of Function: Patient reporting he was ambulating independently inside and outside of home with RW for about 3 weeks. Prior to that ambulating indepednently without a device. report ambulating with a limp due to left hip and was due to left hip replacement in August this year.      [place] [address] [place]    Lives Alone Yes    Support Systems Spouse/Significant Other/Partner    Shower Situation Shower    Current DME Walker    Lift Chair      Stairs to Enter 2       Rails Bilateral       W/C Ramp No    Interior Steps        Upper Extremity Function   LUE RUE   [address]  Intact  Intact   [facility]  Intact  Intact   Light Touch       Sharp/Dull Discrimination       Hot/Cold       Proprioception       Stereognosis       9 Hole Peg Test       General Comments           Functional Mobility   Performance Comments   Sitting: Static Good (unsupported)    Sitting: Dynamic Fair (occasional)    Standing: Static Fair    Standing: Dynamic Poor (constant support)    Supine to Sit 4 (Minimal assistance)    Sit to Stand Assistance Minimal assistance    Transfer Assist Score 3    Transfer Type SPT with walker Cognition   Performance Comments   Orientation Level Oriented X4    Comprehension Level 6 Mode: Auditory  Hearing Aid:None  Glasses:    Expression (Native Language) 7 Mode: Verbal      Social Interaction/Pragmatics 7     Problem Solving 7     Memory 7     Comments       Activities of Daily Living    Score Comments   Self-Feeding 7     Grooming 5 Tasks completed by patient: Brushed hair, Brushed teeth, Washed face, Washed hands  s/u   Bathing 3 Body Parts Bathe: Abdomen, Arm, left, Arm, right, Chest, Josselin area, Thigh, left, Thigh, right  moderate assist   Tub/Shower Transfer [place] 3 Type of Shower: Shower  Adaptive  Equipment:Tub transfer bench, Grab bars and Walker   Upper Body  Dressing/Undressing 4 Items Applied:Pullover (4 steps)  assist with LSO   Lower Body Dressing/Undressing 1 Items Applied:Shoe, left (1 step), Shoe, right (1 step), Sock, left (1 step), Sock, right (1 step), Elastic waist pants (3 steps)  Dependent, will need AE   Toileting 2 max assist   Toilet Transfer 3 moderate assist     Vision/Perception: No deficits noted. Instrumental Activities of Daily Living   Performance Comments   Meal Preperation Maximum assistance    Homemaking Maximum assistance    Medication Management Independent    Financial Management Independent        Session: Patient completed log roll with minimal assist to the left. Ambulated from bed to tub bench with min/moderate assist with use of RW, good safety awareness. Shower completed and FIM scores noted above. RNAyanna changed bandage after. Interdisciplinary Communication: Collaborated with PT and nursing for current level of function, plan of care, and measures to assure safety during their stay. Updated board with current level of function to increase carryover between disciplines. Patient/Family Education: Patient, Family and Caregivers was/were educated On the role of OT, On POC and On IRC expectations.      Problem List: [address], Activity Tolerance, Safety Awareness, Strength, Pain, Sitting Balance, Standing Balance and Cognition    Functional Limitations: ADL, IADL, Functional Transfers and Functional Mobility    Goals: Please see Care Plan    OT order received and chart reviewed. OT orders have been acknowledged. Patient will benefit from skilled OT services to address ADL, functional transfers, UE strength, UE coordination, balance, cognition and activity tolerance to maximize functional performance with daily self-care tasks and functional mobility. Treatment is likely to include ADL, Balance, Strength, Activity tolerance, Neuro-muscular re-education, DME, AE, Family  and Safety awareness training to increase independence with self-care. Patient will be seen for 1.5-2 hours of skilled OT services 5-6 days a week. Initial goals anticipated for 7-10 days.      Larry Solorzano, OT

## 2017-08-14 NOTE — PROGRESS NOTES
Warfarin dosing per pharmacist    Hazel Brittle is a 63 y.o. male. Indication:  Hx recurrent DVTs and PE    Goal INR:  2.0 - 3.0    Home dose:  10 mg qhs    Risk factors or significant drug interactions:  none    Other anticoagulants:  Enoxaparin ppx    Daily Monitoring  Date  INR     Warfarin dose HGB              Notes  8/9  1.0  10 mg  14.3  8/10  1.0  10 mg  12.7  8/11  1.4  10 mg  11.8  8/12  1.4  10 mg  ---  8/13  1.9  10 mg  12.2  4/14  2.3  10 mg   ---    Pharmacy consulted to assist with warfarin dosing on 8/13. His warfarin was being held for surgery on 8/8 and was resumed on 8/9. INR rising appropriately back on home dose of 10 mg qhs. Continue current dosing. Daily INR for now. Enoxaparin prophylaxis discontinued by MD as INR therapeutic. Pharmacy will continue to follow. Please call with any questions.       Thank you,  Liseth Adam, PharmD, BCPS  Clinical Pharmacist  [phone]

## 2017-08-14 NOTE — PROGRESS NOTES
OT Daily Note    Time In 1306   Time Out 1346     Subjective:\"I am doing good\" Agreeable to therapy. Pain:Remains 5 out of 10, toleratble  Education:On Spine precautions, static stance, upright posture. Interdisciplinary Communication: Collaborated with PTA, [place] and patient is making progress towards goals. Precautions: Spinal, Other (comment) (falls, brace when out of bed)    Balance/functional mobility Daily Assessment   Stood 2 times at standing table with focus on using 1 UE at a time for functional use in stance. Patient able to correctly complete green and white 1inch cubes for replication activity challenging perceptual abilities. Each stance was ~2-3 minutes with cues for leg with and trunk extension. Strengthening/activity tolerance Daily Assessment   UE exercise bike completed for 3 minutes backwards and 4 minutes forward with moderate resistance at moderate pace to increase activity tolerance and UE strength. Joint ROM and AAROM Daily Assessment   Pulleys used for BUE shoulder abduction and flexion holding for 20 seconds each in 1 set of 5. Level 2 joint ROM of left GH joint, posteriorly and inferiorly, good response. Ended session: In w/c in therapy gym with PTA, [place].      Oral Cheek OTR/L

## 2017-08-14 NOTE — PROGRESS NOTES
PHYSICAL THERAPY DAILY NOTE  Time In: 8511  Time Out: 4298  Patient Seen For: PM;Other (see progress notes); Therapeutic exercise;Gait training;Transfer training    Subjective: Patient had no complaints. Objective: Pain level 7 out of 10. Spinal (back brace on when out of bed, fall precautions)  GROSS ASSESSMENT Daily Assessment            BED/MAT MOBILITY Daily Assessment    Supine to Sit : 4 (Minimal assistance)  Sit to Supine : 4 (Minimal assistance)       TRANSFERS Daily Assessment    Transfer Type: SPT with walker  Transfer Assistance : 3 (Moderate assistance )  Sit to Stand Assistance: Moderate assistance       GAIT Daily Assessment    Amount of Assistance: 3 (Moderate assistance)  Distance (ft): 40 Feet (ft)  Assistive Device: Gait belt;Brace/Splint; Other (comment) (Used II bars for gait sequence)       STEPS or STAIRS Daily Assessment    Level of Assist : 0 (Not tested)       BALANCE Daily Assessment            WHEELCHAIR MOBILITY Daily Assessment            LOWER EXTREMITY EXERCISES Daily Assessment    Extremity: Both  Exercise Type #1: Other (comment) (standing pelvic drives)  Sets Performed: 1  Reps Performed: 20  Level of Assist: Minimal assistance          Assessment: Patient making good progress. Plan of Care: Continue with plan of care to reach PT goals. Returned to room with call boles at [facility].     Sarah Beth Springer, PTA  8/14/2017

## 2017-08-14 NOTE — PROGRESS NOTES
PHYSICAL THERAPY DAILY NOTE  Time In: 1116  Time Out: 0089  Patient Seen For: AM;Transfer training;Gait training; Therapeutic exercise; Other (see progress notes)    Subjective: Patient had no complaints. Objective:  Spinal (back brace on when out of bed, fall precautions)  GROSS ASSESSMENT Daily Assessment            BED/MAT MOBILITY Daily Assessment    Supine to Sit : 4 (Minimal assistance)  Sit to Supine : 4 (Minimal assistance)       TRANSFERS Daily Assessment    Transfer Type: SPT with walker  Transfer Assistance : 3 (Moderate assistance )  Sit to Stand Assistance: Moderate assistance       GAIT Daily Assessment   Ace wrap for DF to assist. Amount of Assistance: 3 (Moderate assistance)  Distance (ft): 60 Feet (ft)  Assistive Device: Walker, rolling;Brace/Splint       STEPS or STAIRS Daily Assessment            BALANCE Daily Assessment            WHEELCHAIR MOBILITY Daily Assessment            LOWER EXTREMITY EXERCISES Daily Assessment    Extremity: Both  Exercise Type #1: Seated lower extremity strengthening  Sets Performed: 1  Reps Performed: 20  Level of Assist: Minimal assistance          Assessment: Patient making good progress. Plan of Care: Continue with plan of care to reach PT goals. Returned to room with call bell at reach.     Maggie Purdy PTA  8/14/2017

## 2017-08-14 NOTE — PROGRESS NOTES
Changed back dressing per orders. Wound covered with steri-strips. 4x4 and tegaderm applied. Edges well approximated. Scant amount of serosanguinous drainage on old dressing. Tolerated dressing change well.

## 2017-08-15 LAB
INR PPP: 2 (ref 0.9–1.2)
PROTHROMBIN TIME: 21.7 SEC (ref 9.6–12)

## 2017-08-15 PROCEDURE — 74011250637 HC RX REV CODE- 250/637: Performed by: PHYSICAL MEDICINE & REHABILITATION

## 2017-08-15 PROCEDURE — 97116 GAIT TRAINING THERAPY: CPT

## 2017-08-15 PROCEDURE — 97530 THERAPEUTIC ACTIVITIES: CPT

## 2017-08-15 PROCEDURE — 85610 PROTHROMBIN TIME: CPT | Performed by: PHYSICAL MEDICINE & REHABILITATION

## 2017-08-15 PROCEDURE — 97110 THERAPEUTIC EXERCISES: CPT

## 2017-08-15 PROCEDURE — 36415 COLL VENOUS BLD VENIPUNCTURE: CPT | Performed by: PHYSICAL MEDICINE & REHABILITATION

## 2017-08-15 PROCEDURE — 97535 SELF CARE MNGMENT TRAINING: CPT

## 2017-08-15 PROCEDURE — 65310000000 HC RM PRIVATE REHAB

## 2017-08-15 RX ORDER — WARFARIN 2 MG/1
2 TABLET ORAL ONCE
Status: COMPLETED | OUTPATIENT
Start: 2017-08-15 | End: 2017-08-15

## 2017-08-15 RX ADMIN — OXYCODONE HYDROCHLORIDE 15 MG: 5 TABLET ORAL at 18:24

## 2017-08-15 RX ADMIN — OXYCODONE HYDROCHLORIDE 15 MG: 5 TABLET ORAL at 04:42

## 2017-08-15 RX ADMIN — OXYCODONE HYDROCHLORIDE 15 MG: 5 TABLET ORAL at 23:13

## 2017-08-15 RX ADMIN — FAMOTIDINE 20 MG: 20 TABLET ORAL at 18:21

## 2017-08-15 RX ADMIN — STANDARDIZED SENNA CONCENTRATE AND DOCUSATE SODIUM 2 TABLET: 8.6; 5 TABLET, FILM COATED ORAL at 18:21

## 2017-08-15 RX ADMIN — FAMOTIDINE 20 MG: 20 TABLET ORAL at 08:22

## 2017-08-15 RX ADMIN — TRAZODONE HYDROCHLORIDE 50 MG: 50 TABLET ORAL at 21:00

## 2017-08-15 RX ADMIN — WARFARIN SODIUM 2 MG: 2 TABLET ORAL at 13:00

## 2017-08-15 RX ADMIN — CYCLOBENZAPRINE HYDROCHLORIDE 5 MG: 10 TABLET, FILM COATED ORAL at 21:00

## 2017-08-15 RX ADMIN — STANDARDIZED SENNA CONCENTRATE AND DOCUSATE SODIUM 2 TABLET: 8.6; 5 TABLET, FILM COATED ORAL at 08:21

## 2017-08-15 RX ADMIN — WARFARIN SODIUM 10 MG: 10 TABLET ORAL at 21:00

## 2017-08-15 RX ADMIN — NEBIVOLOL HYDROCHLORIDE 5 MG: 5 TABLET ORAL at 08:22

## 2017-08-15 RX ADMIN — OXYCODONE HYDROCHLORIDE 15 MG: 5 TABLET ORAL at 12:58

## 2017-08-15 RX ADMIN — OXYCODONE HYDROCHLORIDE 15 MG: 5 TABLET ORAL at 08:22

## 2017-08-15 NOTE — PROGRESS NOTES
Problem: Falls - Risk of  Goal: *Absence of Falls  Document Erick Fall Risk and appropriate interventions in the flowsheet.    Outcome: Progressing Towards Goal  Fall Risk Interventions:  Mobility Interventions: OT consult for ADLs, Patient to call before getting OOB, PT Consult for mobility concerns, PT Consult for assist device competence, Strengthening exercises (ROM-active/passive), Utilize walker, cane, or other assitive device, Communicate number of staff needed for ambulation/transfer     Mentation Interventions: Evaluate medications/consider consulting pharmacy, Toileting rounds, Increase mobility     Medication Interventions: Patient to call before getting OOB, Evaluate medications/consider consulting pharmacy, Teach patient to arise slowly     Elimination Interventions: Call light in reach, Patient to call for help with toileting needs, Toileting schedule/hourly rounds, Toilet paper/wipes in reach, Urinal in reach     History of Falls Interventions: Consult care management for discharge planning, Evaluate medications/consider consulting pharmacy

## 2017-08-15 NOTE — PROGRESS NOTES
08/15/17 0923   Time Spent With Patient   Time In 0702   Time Out 0742   Patient Seen For: AM;ADLs   Feeding/Eating   Feeding/Eating Assistance I   Grooming   Grooming Assistance  S   Comments s/u   Upper Body Bathing   Bathing Assistance, Upper S   Position Performed Seated in chair;Standing   Adaptive Equipment Tub bench   Lower Body Bathing   Bathing Assistance, [address] handled sponge   Position Performed Seated in chair;Standing   Adaptive Equipment Tub bench   Comments Assist with stance and assist with drying distal LE's. Upper Body Dressing    Dressing Assistance  S   Lower Body Dressing    Dressing Assistance  Dep   Position Performed Seated in chair;Standing   Comments assisted secondary to pain poorly managed towards end of session. Functional Transfers   Tub or Shower Type Shower   Amount of Assistance Required Min A   [place] Grab bars; Walker (comment); Tub transfer bench     S: \"I didn't sleep as good as normal.\" Agreeable to therapy. Focus of session was on morning ADL routine. Patient was able to ambulate ~10 feet using a RW with minimal assist.   Pain tolerated well to start session, but pain slowly increased during session and by the end was not tolerating well, updated RN, Mallory Padron, but no pain medication due for almost an hour. Collaborated with Emelyn STOCKTON and confirmed patient is on track to reach goals as documented in the care plan. Patient tolerated session well, but strength, balance, activity tolerance, ROM, posture, spine techniques, pain are still below baseline and requires skilled facilitation to successfully and safely complete ADL's and transfers. Patient ended session in w/c with call remote and phone within reach.      RUMA Patino

## 2017-08-15 NOTE — PROGRESS NOTES
OT Daily Note    Time In 1032   Time Out 1115     Subjective:\"I am doing okay\" Agreeable to therapy. Pain:Tolerated during session. Education:On KT tape, upright posture, UE HEP. Interdisciplinary Communication: Collaborated with Rajesh STOCKTON and patient is making progress towards goals. Precautions: Spinal, Other (comment) (falls, brace when out of bed)    Balance/functional mobility Daily Assessment   Ambulated 50' first without KT tape and than 48' with KT tape, both using right toe up and RW with minimal assist. Patient right knee more stable with tape. Strengthening/activity tolerance Daily Assessment   Joint level 1-2 mobilization with left shoulder in [address] inferiorly and posteriorly. PROM in left shoulder flexion and abduction. UE exercise bike completed for 4 minutes, backwards, with no resistance at moderate pace to increase activity tolerance and UE strength. Red theraband used in 2 sets of 15 for scapular rows. IR and ER used with LUE with no resistance with towel roll under left arm. KT tape Daily Assessment   Applied to posterior right knee to increase proprioceptive input using functional bridge technique. Ended session:In gym with Rajesh STOCKTON.      Keri Montano OTR/L

## 2017-08-15 NOTE — PROGRESS NOTES
PHYSICAL THERAPY DAILY NOTE  Time In: 2820  Time Out: 1031  Patient Seen For: AM;Transfer training;Gait training; Therapeutic exercise; Other (see progress notes)    Subjective: Patient stated that he has pain under control today. Objective: Pain 4 out of 10 on pain scale. Pain meds given prior to treatment. Spinal (back brace on when out of bed, fall precautions)  GROSS ASSESSMENT Daily Assessment            BED/MAT MOBILITY Daily Assessment    Supine to Sit : 4 (Minimal assistance)  Sit to Supine : 4 (Minimal assistance)       TRANSFERS Daily Assessment    Transfer Type: SPT with walker  Transfer Assistance : 3 (Moderate assistance )  Sit to Stand Assistance: Moderate assistance       GAIT Daily Assessment    Amount of Assistance: 3 (Moderate assistance)  Distance (ft): 60 Feet (ft)  Assistive Device: Gait belt;Brace/Splint; Walker, rolling (Toe off on right)       STEPS or STAIRS Daily Assessment    Level of Assist : 0 (Not tested)       BALANCE Daily Assessment            WHEELCHAIR MOBILITY Daily Assessment    Functional Level: 4       LOWER EXTREMITY EXERCISES Daily Assessment    Extremity: Both  Exercise Type #1: Seated lower extremity strengthening  Sets Performed: 1  Reps Performed: 20  Level of Assist: Minimal assistance  Exercise Type #2: Other (comment) (motomed x 10 minutes)  Sets Performed: 1  Reps Performed: 10  Exercise Type #3: Other (comment) (standing pelvic drives)  Sets Performed: 1  Reps Performed: 0  Level of Assist: Moderate assistance          Assessment: Patient making good progress. Plan of Care: Continue with plan of care to reach PT goals. Returned to room with call bell at reach.     Vasiliy Avila, PTA  8/15/2017

## 2017-08-15 NOTE — PROGRESS NOTES
Physical Medicine and Rehabilitation Progress Note    Triston Ardon  Admit Date: 8/12/2017  Admit Diagnosis: Post Spinal Surgery Rehab;Spinal stenosis of lumbar region    Subjective: Patient seen and examined. Vss. Afebrile. Doing well. No new neurologic deficits. States pain is tolerable. Patient requiring moderate assistance for gait. Slow improvement seen. No new barriers. Objective:     Current Facility-Administered Medications   Medication Dose Route Frequency    oxyCODONE IR (ROXICODONE) tablet 5-15 mg  5-15 mg Oral Q4H PRN    traZODone (DESYREL) tablet 50 mg  50 mg Oral QHS PRN    senna-docusate (PERICOLACE) 8.6-50 mg per tablet 2 Tab  2 Tab Oral BID    bisacodyl (DULCOLAX) suppository 10 mg  10 mg Rectal DAILY PRN    bisacodyl (DULCOLAX) tablet 5 mg  5 mg Oral DAILY PRN    cyclobenzaprine (FLEXERIL) tablet 5 mg  5 mg Oral TID PRN    magnesium hydroxide (MILK OF MAGNESIA) 400 mg/5 mL oral suspension 30 mL  30 mL Oral DAILY PRN    alum-mag hydroxide-simeth (MYLANTA) oral suspension 30 mL  30 mL Oral Q4H PRN    [START ON 8/18/2017] testosterone enanthate (DELATESTRYL) injection 200 mg  200 mg IntraMUSCular Q 14 DAYS    famotidine (PEPCID) tablet 20 mg  20 mg Oral BID    ondansetron (ZOFRAN ODT) tablet 4 mg  4 mg Oral Q6H PRN    warfarin (COUMADIN) tablet 10 mg - pharmacy dosing  10 mg Oral QHS    nebivolol (BYSTOLIC) tablet 5 mg  5 mg Oral DAILY     Allergies   Allergen Reactions    Adhesive Contact Dermatitis       Visit Vitals    /62    Pulse 72    Temp 97.7 °F (36.5 °C)    Resp 18    SpO2 99%      Intake and Output:  08/13 1901 - 08/15 0700  In: 240 [P.O.:240]  Out: 800 [Urine:800]    Physical Exam:   General:  Alert, NAD,  resting comfortably in bed       Lungs:  CTA Bilaterally, decreased R LL breath sounds. Heart:  Regular rate and rhythm, no murmur        Abdomen:  Soft, bowel sounds present. .    Neuromuscular:  Left Upper Extremity MMT:  5/5  Sensation: intact     Right Upper Extremity MMT:  5/5      Sensation: intact     Left Lower Extremity MMT:   Hip Flexion: 4-  Knee extension: 5-/5  Ankle Dorsi flexion: 3-/5  EHL: 3-/5  Ankle Plantar Flexion: 4-/5  Sensation: intact     Right Lower Extremity MMT:  Hip Flexion: 4-/5  Knee extension: 4+/5  Ankle Dorsi flexion 4/5  EHL: 4/5  Ankle Plantar Flexion: 4+/5  Sensation: intact   Skin/extremities:  Intact, bilateral SRINATH stockings in place, calves soft, no LE edema       Incision:   dressing intact - clean, dry, no marginal erythema, no rashes or lesions     Functional Assessment:          Balance  Sitting - Static: Good (unsupported) (08/13/17 1100)  Sitting - Dynamic: Fair (occasional) (08/13/17 1100)  Standing - Static: Fair (08/13/17 1100)       Adenike Hughes Fall Risk Assessment:  Mannytammy Hughes Fall Risk  Mobility: Ambulates or transfers with assist devices or assistance/unsteady gait (08/15/17 0731)  Mobility Interventions: Communicate number of staff needed for ambulation/transfer;Patient to call before getting OOB; Strengthening exercises (ROM-active/passive); Utilize walker, cane, or other assitive device (08/15/17 0731)  Mentation: Alert, oriented x 3 (08/15/17 0731)  Mentation Interventions: Adequate sleep, hydration, pain control; Increase mobility; Toileting rounds (08/15/17 0731)  Medication: Patient receiving anticonvulsants, sedatives(tranquilizers), psychotropics or hypnotics, hypoglycemics, narcotics, sleep aids, antihypertensives, laxatives, or diuretics (08/15/17 0731)  Medication Interventions: Patient to call before getting OOB; Teach patient to arise slowly (08/15/17 0731)  Elimination: Needs assistance with toileting (08/15/17 0731)  Elimination Interventions: Call light in reach; Patient to call for help with toileting needs; Toileting schedule/hourly rounds (08/15/17 0731)  Prior Fall History: No (08/15/17 0731)  History of Falls Interventions: Consult care management for discharge planning (08/15/17 0731)  Total Score: 3 (08/15/17 3988)  Standard Fall Precautions: Yes (08/13/17 1945)  High Fall Risk: Yes (08/15/17 5874)     Speech Assessment:         Ambulation:  Gait  Distance (ft): 40 Feet (ft) (08/14/17 4307)  Assistive Device: Gait belt;Brace/Splint; Other (comment) (Used II bars for gait sequence) (08/14/17 9212)     Labs/Studies:  Recent Results (from the past 72 hour(s))   METABOLIC PANEL, COMPREHENSIVE    Collection Time: 08/13/17  1:31 PM   Result Value Ref Range    Sodium 140 136 - 145 mmol/L    Potassium 4.0 3.5 - 5.1 mmol/L    Chloride 103 98 - 107 mmol/L    CO2 30 21 - 32 mmol/L    Anion gap 7 7 - 16 mmol/L    Glucose 146 (H) 65 - 100 mg/dL    BUN 11 6 - 23 MG/DL    Creatinine 0.81 0.8 - 1.5 MG/DL    GFR est AA >60 >60 ml/min/1.73m2    GFR est non-AA >60 >60 ml/min/1.73m2    Calcium 8.1 (L) 8.3 - 10.4 MG/DL    Bilirubin, total 1.2 (H) 0.2 - 1.1 MG/DL    ALT (SGPT) 59 12 - 65 U/L    AST (SGOT) 30 15 - 37 U/L    Alk. phosphatase 49 (L) 50 - 136 U/L    Protein, total 5.8 (L) 6.3 - 8.2 g/dL    Albumin 2.6 (L) 3.5 - 5.0 g/dL    Globulin 3.2 2.3 - 3.5 g/dL    A-G Ratio 0.8 (L) 1.2 - 3.5     CBC W/O DIFF    Collection Time: 08/13/17  1:31 PM   Result Value Ref Range    WBC 5.7 4.3 - 11.1 K/uL    RBC 3.55 (L) 4.23 - 5.67 M/uL    HGB 12.2 (L) 13.6 - 17.2 g/dL    HCT 36.6 (L) 41.1 - 50.3 %    .1 (H) 79.6 - 97.8 FL    MCH 34.4 (H) 26.1 - 32.9 PG    MCHC 33.3 31.4 - 35.0 g/dL    RDW 14.6 11.9 - 14.6 %    PLATELET 936 (L) 160 - 450 K/uL    MPV 9.0 (L) 10.8 - 14.1 FL   PROTHROMBIN TIME + INR    Collection Time: 08/13/17  1:31 PM   Result Value Ref Range    Prothrombin time 20.4 (H) 9.6 - 12.0 sec    INR 1.9 (H) 0.9 - 1.2     PROTHROMBIN TIME + INR    Collection Time: 08/14/17  7:21 AM   Result Value Ref Range    Prothrombin time 24.8 (H) 9.6 - 12.0 sec    INR 2.3 (H) 0.9 - 1.2         Assessment:   This is a 64 YO with 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 He continues with gait dysfunction, mobility and self care impairments.      Rehabilitation Plan  Continue PT for a minimum of 1.5 hours a day, at least 5 out of 7 days per week to address bed mobility, transfers, ambulation, strengthening, balance, and endurance. The patient to wear back brace when OOB. Continue OT for a minimum of 1.5 hours a day, at least 5 out of 7 days per week to address ADL (bathing, LE dressing, toileting) safety; energy conservations and adaptive equipment as needed.     Continue 24-hour skilled rehabilitation nursing for bowel and bladder management, skin care for decubitus ulcer prevention , pain management and ongoing medication administration      The patient may benefit from a psychology consult for depression, adjustment disorder. Hx of DVT/ PE, coagulopathy - Anticoagulation management - on long term coumadin secondary to recurrent DVTs/PE. - Continue coumadin, INR- 2.3 on 8/14, lovenox stopped. - pharmacy asisting with dosing. INR 2.3->2.0 ( 8/15)    Spinal stenosis -  Begun on prednisone PTA for a few weeks. Prednisone tapered, discontinued. HTN -  Controlled on bystolic    Bowel mgmt - on scheduled senokot , prn dulcolax, MOM    GI prophylaxis - on pepcid bid    Pain mgmt- advance to oxycodone 5-15mg q4prn, flexeril prn, yesterday received total 45mg of oxycodone. Fair control. Continue to monitor, assess pain level. Insomnia - prn trazodone    Acute blood loss anemia - Hgb - 11.8 > 12.2 on 8/13. Monitor. Time spent was 25 minutes with over 1/2 in direct patient care/examination, consultation and coordination of care.     Signed By: Kevin Luong MD     August 15, 2017

## 2017-08-15 NOTE — PROGRESS NOTES
Care Management Interventions  PCP Verified by CM: Yes  Transition of Care Consult (CM Consult): Discharge Planning  Physical Therapy Consult: Yes  Occupational Therapy Consult: Yes  Speech Therapy Consult: No  Current Support Network: Lives Alone, Own Home, Other (Girlfriend in area)  Plan discussed with Pt/Family/Caregiver: Yes  Freedom of Choice Offered: Yes  Discharge Location  Discharge Placement: Home    Pt admitted from [facility] - lumbar spinal stenosis. Pt lives alone and has been independent with all self care. Was working and driving. Was recently using walker for ambulation. Pt presently on sick leave. Pt states that he will probably go to his girlfriend's [facility] Lesly[facility] [phone]) at discharge to stay. GF works and her children are in school. Pt will be alone during day therefore needs to be functionally independent. Pt lives in a one level home with 2 CINTHIA. Pt has a walker and shower chair. Used [facility] in the past.  Pt has [place]. Pt approved for three days with update due on 8/15 to [address] [place] [address] at [phone]. Clinicals faxed requesting additional rehab days. Role of SW and IRC process discussed. Will follow for dc planning and case management needs.

## 2017-08-15 NOTE — PROGRESS NOTES
PHYSICAL THERAPY DAILY NOTE  Time In: 2167  Time Out: 7098  Patient Seen For: AM;Other (see progress notes);Gait training    Subjective: Patient had no complaints. Objective:  Spinal (back brace on when out of bed, fall precautions)  GROSS ASSESSMENT Daily Assessment            BED/MAT MOBILITY Daily Assessment    Supine to Sit : 0 (Not tested)  Sit to Supine : 4 (Minimal assistance)       TRANSFERS Daily Assessment    Transfer Type: SPT with walker  Transfer Assistance : 3 (Moderate assistance )  Sit to Stand Assistance: Moderate assistance       GAIT Daily Assessment    Amount of Assistance: 3 (Moderate assistance)  Distance (ft): 60 Feet (ft)  Assistive Device: Gait belt;Brace/Splint; Walker, rolling (Toe off on right and back brace)       STEPS or STAIRS Daily Assessment    Level of Assist : 0 (Not tested)       BALANCE Daily Assessment            WHEELCHAIR MOBILITY Daily Assessment    Functional Level: 4       LOWER EXTREMITY EXERCISES Daily Assessment    Extremity: Both  Exercise Type #1: Seated lower extremity strengthening  Sets Performed: 1  Reps Performed: 20  Level of Assist: Minimal assistance  Exercise Type #2: Other (comment) (motomed x 10 minutes)  Sets Performed: 1  Reps Performed: 10  Exercise Type #3: Other (comment) (standing pelvic drives)  Sets Performed: 1  Reps Performed: 0  Level of Assist: Moderate assistance          Assessment: Patient working hard. Plan of Care: Continue with plan of care to reach PT goals. Returned to room with call bell at reach.     Salvador Antonio PTA  8/15/2017

## 2017-08-15 NOTE — PROGRESS NOTES
OT Daily Note    Time In 1300   Time Out 1346     Subjective: \"My low back hurts, but that's normal for me. \"  Pain: 5/10    Precautions: Spinal, Other (comment) (falls, brace when out of bed)    Self-Care   Patient educated regarding sock aid and long handled shoe horn for donning/doffing socks and shoes with adherence to spinal precautions. Patient verbalized and demonstrated understanding to don L sock with setup of sock aid and L shoe, shoes adapted with elastic laces for increased independence with shoe tying. Assist to don R shoe with AFO in place. Activity Tolerance   Patient completed standing trial at elevated tray table for 1-handed task. Patient transferred sit to stand and maintained standing balance with minimal assistance, therapist blocking at R knee. Patient tolerated standing x 5 minutes, weightbearing through alternating UE on tabletop for balance, before requiring seated rest break. Strengthening   Patient completed BUE endurance task seated at tabletop with 2# cuff weight to RUE, no weight to LUE. Patient completed pipe tree designs x 2 with 100% accuracy. Patient deconstructed pieces upon completion of each pattern and returned to container on tabletop. Assessment: Patient tolerated well. Education: AE for LB dressing  Interdisciplinary Communication: Collaborated with Kaylee Connolly and agreed patient is progressing well and on-track to meet goals as stated in POC. Plan: Continue to address ADL/IADL, functional mobility, activity tolerance, balance, strengthening.     Lucio Malave, OTR/L

## 2017-08-15 NOTE — PROGRESS NOTES
Warfarin dosing per pharmacist    Kristy Gross is a 63 y.o. male. Indication:  Hx recurrent DVTs and PE    Goal INR:  2.0 - 3.0    Home dose:  10 mg qhs    Risk factors or significant drug interactions:  none    Other anticoagulants:      Daily Monitoring  Date  INR     Warfarin dose  HGB              Notes  8/9  1.0  10 mg   14.3  8/10  1.0  10 mg   12.7  8/11  1.4  10 mg   11.8  8/12  1.4  10 mg   ---  8/13  1.9  10 mg   12.2  8/14  2.3  10 mg    ---  8/15  2.0  2 mg + 10 mg  ---    Pharmacy consulted to assist with warfarin dosing on 8/13. His warfarin was being held for surgery on 8/8 and was resumed on 8/9. INR trending down slightly, now at 2.0. As INR trending down slightly and at the lower end of therapeutic range, will give an additional 2 mg today for a total dose of 12 mg. Continue to follow INR daily for now. Pharmacy will continue to follow. Please call with any questions.       Thank you,  Liliya Calle, PharmD  Clinical Pharmacist  [phone]

## 2017-08-15 NOTE — PROGRESS NOTES
PHYSICAL THERAPY DAILY NOTE  Time In: 9619  Time Out: 1435  Patient Seen For: PM;Other (see progress notes); Therapeutic exercise;Gait training;Transfer training    Subjective: Patient had no complaints. Objective: Pain level 3 out of 10 on pain scale. Spinal (back brace on when out of bed, fall precautions)  GROSS ASSESSMENT Daily Assessment            BED/MAT MOBILITY Daily Assessment    Supine to Sit : 4 (Minimal assistance)  Sit to Supine : 4 (Minimal assistance)       TRANSFERS Daily Assessment    Transfer Type: SPT with walker  Transfer Assistance : 3 (Moderate assistance )  Sit to Stand Assistance: Moderate assistance       GAIT Daily Assessment    Amount of Assistance: 3 (Moderate assistance)  Distance (ft): 60 Feet (ft)  Assistive Device: Gait belt;Walker, rolling;Brace/Splint (toe off brace on right )       STEPS or STAIRS Daily Assessment    Level of Assist : 0 (Not tested)       BALANCE Daily Assessment            WHEELCHAIR MOBILITY Daily Assessment    Functional Level: 4       LOWER EXTREMITY EXERCISES Daily Assessment    Extremity: Both  Exercise Type #1: Supine lower extremity strengthening  Sets Performed: 2  Reps Performed: 10  Level of Assist: Minimal assistance  Exercise Type #2: Other (comment) (motomed x 10 minutes)  Sets Performed: 1  Reps Performed: 10  Exercise Type #3: Other (comment) (standing pelvic drives)  Sets Performed: 1  Reps Performed: 0  Level of Assist: Moderate assistance          Assessment: Patient s gait sequence has improved with posture and toe off brace. Plan of Care: Continue with plan of care to reach PT goals. Returned to room to bed with call bell at reach.     Augustus Brooks, PTA  8/15/2017

## 2017-08-16 LAB
INR PPP: 3.1 (ref 0.9–1.2)
PROTHROMBIN TIME: 34.1 SEC (ref 9.6–12)

## 2017-08-16 PROCEDURE — 97530 THERAPEUTIC ACTIVITIES: CPT

## 2017-08-16 PROCEDURE — 97110 THERAPEUTIC EXERCISES: CPT

## 2017-08-16 PROCEDURE — 74011250637 HC RX REV CODE- 250/637: Performed by: PHYSICAL MEDICINE & REHABILITATION

## 2017-08-16 PROCEDURE — 36415 COLL VENOUS BLD VENIPUNCTURE: CPT | Performed by: PHYSICAL MEDICINE & REHABILITATION

## 2017-08-16 PROCEDURE — 85610 PROTHROMBIN TIME: CPT | Performed by: PHYSICAL MEDICINE & REHABILITATION

## 2017-08-16 PROCEDURE — 97535 SELF CARE MNGMENT TRAINING: CPT

## 2017-08-16 PROCEDURE — 97116 GAIT TRAINING THERAPY: CPT

## 2017-08-16 PROCEDURE — 65310000000 HC RM PRIVATE REHAB

## 2017-08-16 RX ORDER — WARFARIN SODIUM 5 MG/1
5 TABLET ORAL
Status: DISCONTINUED | OUTPATIENT
Start: 2017-08-16 | End: 2017-08-18

## 2017-08-16 RX ADMIN — OXYCODONE HYDROCHLORIDE 15 MG: 5 TABLET ORAL at 15:44

## 2017-08-16 RX ADMIN — OXYCODONE HYDROCHLORIDE 15 MG: 5 TABLET ORAL at 03:45

## 2017-08-16 RX ADMIN — WARFARIN SODIUM 5 MG: 5 TABLET ORAL at 21:12

## 2017-08-16 RX ADMIN — FAMOTIDINE 20 MG: 20 TABLET ORAL at 08:10

## 2017-08-16 RX ADMIN — STANDARDIZED SENNA CONCENTRATE AND DOCUSATE SODIUM 2 TABLET: 8.6; 5 TABLET, FILM COATED ORAL at 08:10

## 2017-08-16 RX ADMIN — TRAZODONE HYDROCHLORIDE 50 MG: 50 TABLET ORAL at 22:24

## 2017-08-16 RX ADMIN — OXYCODONE HYDROCHLORIDE 15 MG: 5 TABLET ORAL at 08:10

## 2017-08-16 RX ADMIN — STANDARDIZED SENNA CONCENTRATE AND DOCUSATE SODIUM 2 TABLET: 8.6; 5 TABLET, FILM COATED ORAL at 17:31

## 2017-08-16 RX ADMIN — FAMOTIDINE 20 MG: 20 TABLET ORAL at 17:30

## 2017-08-16 RX ADMIN — OXYCODONE HYDROCHLORIDE 15 MG: 5 TABLET ORAL at 22:24

## 2017-08-16 NOTE — PROGRESS NOTES
08/16/17 1021   Time Spent With Patient   Time In [address] [facility]   Time Out 0835   Patient Seen For: AM;ADLs   Grooming   Grooming Assistance  I   Upper Body Bathing   Bathing Assistance, Upper S   Position Performed Seated in chair;Standing   Lower Body Bathing   Bathing Assistance, Lower  Min A   Adaptive Equipment Grab bar   Position Performed Seated in chair;Standing   Comments improving balance   Toileting   Toileting Assistance (FIM Score) Mod A   Cues Physical assistance for pants down   Upper Body [address]   Lower Body Dressing    Dressing Assistance  Min A   Position Performed Seated in chair;Standing   Adaptive Equipment Used Sock aid;Reacher   Comments able to use reacher for donning shorts, sock aid for socks, will need AE for shoes. Functional Transfers   Toilet Transfer  Grab bars   Amount of Assistance Required Mod A   Tub or Shower Type Shower   Amount of Assistance Required Min A   Adaptive Equipment Grab bars; Tub transfer bench; Wheelchair     S: \"I slept okay, I need to eat really quick. \" Agreeable to therapy. Focus of session was on morning ADL routine. Patient was able to ambulate ~15 feet using a RW with min/moderate assist.   Pain tolerated slightly better than last session. Collaborated with Jessica STOCKTON and confirmed patient is on track to reach goals as documented in the care plan. Patient tolerated session well, but strength, balance, activity tolerance are still below baseline and requires skilled facilitation to successfully and safely complete ADL's and transfers. Patient ended session in w/c with Jessica STOCKTON in therapy gym.       RUMA Yu

## 2017-08-16 NOTE — PROGRESS NOTES
Problem: Falls - Risk of  Goal: *Absence of Falls  Document Erick Fall Risk and appropriate interventions in the flowsheet.    Outcome: Progressing Towards Goal  Fall Risk Interventions:  Mobility Interventions: Utilize walker, cane, or other assitive device     Mentation Interventions: Increase mobility     Medication Interventions: Patient to call before getting OOB     Elimination Interventions: Call light in reach     History of Falls Interventions: Door open when patient unattended

## 2017-08-16 NOTE — PROGRESS NOTES
Pt up in bathroom with OT to do AM care. Meds given and pain med provided to patient before he goes to OT/PT.

## 2017-08-16 NOTE — PROGRESS NOTES
OT Daily Note    Time In 1031   Time Out 1115     Subjective:\"I am doing good, back is fine\" Agreeable to therapy. Pain:5 out of 10. Interdisciplinary Communication: Collaborated with Rajesh STOCKTON and patient is making progress towards goals. Precautions: Other (comment) (falls & brace on when out of bed)    Balance/functional mobility Daily Assessment   Ambulated 70' x 2 with right toe up and RW. Cues for left foot step length and upright posture, min/moderate assist.  Mat mobility moderate assist at legs for sit to supine. Strengthening/ ROM Daily Assessment   Supine on mat for LUE GH joint mobilization. Sharyn used supine on mat for AAROM LUE flexion, and abduction 1 set of 3 holding for 20 seconds each. UE exercise bike completed for 8 minutes with moderate resistance at moderate pace to increase activity tolerance and UE strength. Ended session: Supine in bed all needs within reach.      Keri Montano OTR/L

## 2017-08-16 NOTE — PROGRESS NOTES
Physical Medicine and Rehabilitation Progress Note    Refugio Nap  Admit Date: 8/12/2017  Admit Diagnosis: Post Spinal Surgery Rehab;Spinal stenosis of lumbar region    Subjective: Patient seen and examined. Vss. Afebrile. Doing well. No new neurologic deficits. States pain is tolerable. Patient requiring moderate assistance for gait. Slow improvement seen. No new barriers. Objective:     Current Facility-Administered Medications   Medication Dose Route Frequency    oxyCODONE IR (ROXICODONE) tablet 5-15 mg  5-15 mg Oral Q4H PRN    traZODone (DESYREL) tablet 50 mg  50 mg Oral QHS PRN    senna-docusate (PERICOLACE) 8.6-50 mg per tablet 2 Tab  2 Tab Oral BID    bisacodyl (DULCOLAX) suppository 10 mg  10 mg Rectal DAILY PRN    bisacodyl (DULCOLAX) tablet 5 mg  5 mg Oral DAILY PRN    cyclobenzaprine (FLEXERIL) tablet 5 mg  5 mg Oral TID PRN    magnesium hydroxide (MILK OF MAGNESIA) 400 mg/5 mL oral suspension 30 mL  30 mL Oral DAILY PRN    alum-mag hydroxide-simeth (MYLANTA) oral suspension 30 mL  30 mL Oral Q4H PRN    [START ON 8/18/2017] testosterone enanthate (DELATESTRYL) injection 200 mg  200 mg IntraMUSCular Q 14 DAYS    famotidine (PEPCID) tablet 20 mg  20 mg Oral BID    ondansetron (ZOFRAN ODT) tablet 4 mg  4 mg Oral Q6H PRN    warfarin (COUMADIN) tablet 10 mg - pharmacy dosing  10 mg Oral QHS    nebivolol (BYSTOLIC) tablet 5 mg  5 mg Oral DAILY     Allergies   Allergen Reactions    Adhesive Contact Dermatitis       Visit Vitals    BP 95/65    Pulse 69    Temp 98 °F (36.7 °C)    Resp 18    SpO2 92%      Intake and Output:  08/14 1901 - 08/16 0700  In: 360 [P.O.:360]  Out: -     Physical Exam:   General:  Alert, NAD,  resting comfortably in bed       Lungs:  CTA Bilaterally, decreased R LL breath sounds. Heart:  Regular rate and rhythm, no murmur        Abdomen:  Soft, bowel sounds present. .    Neuromuscular:  Left Upper Extremity MMT:  5/5  Sensation: intact     Right Upper Extremity MMT:  5/5      Sensation: intact     Left Lower Extremity MMT:   Hip Flexion: 4-  Knee extension: 5-/5  Ankle Dorsi flexion: 3-/5  EHL: 3-/5  Ankle Plantar Flexion: 4-/5  Sensation: intact     Right Lower Extremity MMT:  Hip Flexion: 4-/5  Knee extension: 4+/5  Ankle Dorsi flexion 4/5  EHL: 4/5  Ankle Plantar Flexion: 4+/5  Sensation: intact   Skin/extremities:  Intact, bilateral SRINATH stockings in place, calves soft, no LE edema       Incision:   dressing intact - clean, dry, no marginal erythema, no rashes or lesions     Functional Assessment:          Balance  Sitting - Static: Good (unsupported) (08/13/17 1100)  Sitting - Dynamic: Fair (occasional) (08/13/17 1100)  Standing - Static: Fair (08/13/17 1100)       [place] Rank Fall Risk Assessment:  [place] Rank Fall Risk  Mobility: Ambulates or transfers with assist devices or assistance/unsteady gait (08/15/17 1928)  Mobility Interventions: Utilize walker, cane, or other assitive device (08/15/17 1928)  Mentation: Alert, oriented x 3 (08/15/17 1928)  Mentation Interventions: Increase mobility (08/15/17 1928)  Medication: Patient receiving anticonvulsants, sedatives(tranquilizers), psychotropics or hypnotics, hypoglycemics, narcotics, sleep aids, antihypertensives, laxatives, or diuretics (08/15/17 1928)  Medication Interventions: Patient to call before getting OOB (08/15/17 1928)  Elimination: Needs assistance with toileting (08/15/17 1928)  Elimination Interventions: Call light in reach (08/15/17 1928)  Prior Fall History: No (08/15/17 1928)  History of Falls Interventions: Door open when patient unattended (08/15/17 1928)  Total Score: 3 (08/15/17 1928)  Standard Fall Precautions: Yes (08/13/17 1945)  High Fall Risk: Yes (08/15/17 1928)     Speech Assessment:         Ambulation:  Gait  Distance (ft): 60 Feet (ft) (08/15/17 1517)  Assistive Device: Gait belt;Walker, rolling;Brace/Splint (toe off brace on right ) (08/15/17 1517)     Labs/Studies:  Recent Results (from the past 72 hour(s))   METABOLIC PANEL, COMPREHENSIVE    Collection Time: 08/13/17  1:31 PM   Result Value Ref Range    Sodium 140 136 - 145 mmol/L    Potassium 4.0 3.5 - 5.1 mmol/L    Chloride 103 98 - 107 mmol/L    CO2 30 21 - 32 mmol/L    Anion gap 7 7 - 16 mmol/L    Glucose 146 (H) 65 - 100 mg/dL    BUN 11 6 - 23 MG/DL    Creatinine 0.81 0.8 - 1.5 MG/DL    GFR est AA >60 >60 ml/min/1.73m2    GFR est non-AA >60 >60 ml/min/1.73m2    Calcium 8.1 (L) 8.3 - 10.4 MG/DL    Bilirubin, total 1.2 (H) 0.2 - 1.1 MG/DL    ALT (SGPT) 59 12 - 65 U/L    AST (SGOT) 30 15 - 37 U/L    Alk. phosphatase 49 (L) 50 - 136 U/L    Protein, total 5.8 (L) 6.3 - 8.2 g/dL    Albumin 2.6 (L) 3.5 - 5.0 g/dL    Globulin 3.2 2.3 - 3.5 g/dL    A-G Ratio 0.8 (L) 1.2 - 3.5     CBC W/O DIFF    Collection Time: 08/13/17  1:31 PM   Result Value Ref Range    WBC 5.7 4.3 - 11.1 K/uL    RBC 3.55 (L) 4.23 - 5.67 M/uL    HGB 12.2 (L) 13.6 - 17.2 g/dL    HCT 36.6 (L) 41.1 - 50.3 %    .1 (H) 79.6 - 97.8 FL    MCH 34.4 (H) 26.1 - 32.9 PG    MCHC 33.3 31.4 - 35.0 g/dL    RDW 14.6 11.9 - 14.6 %    PLATELET 240 (L) 777 - 450 K/uL    MPV 9.0 (L) 10.8 - 14.1 FL   PROTHROMBIN TIME + INR    Collection Time: 08/13/17  1:31 PM   Result Value Ref Range    Prothrombin time 20.4 (H) 9.6 - 12.0 sec    INR 1.9 (H) 0.9 - 1.2     PROTHROMBIN TIME + INR    Collection Time: 08/14/17  7:21 AM   Result Value Ref Range    Prothrombin time 24.8 (H) 9.6 - 12.0 sec    INR 2.3 (H) 0.9 - 1.2     PROTHROMBIN TIME + INR    Collection Time: 08/15/17  6:28 AM   Result Value Ref Range    Prothrombin time 21.7 (H) 9.6 - 12.0 sec    INR 2.0 (H) 0.9 - 1.2     PROTHROMBIN TIME + INR    Collection Time: 08/16/17  6:20 AM   Result Value Ref Range    Prothrombin time 34.1 (H) 9.6 - 12.0 sec    INR 3.1 (H) 0.9 - 1.2         Assessment:   This is a 64 YO with 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 He continues with gait dysfunction, mobility and self care impairments.      Rehabilitation Plan  Continue PT for a minimum of 1.5 hours a day, at least 5 out of 7 days per week to address bed mobility, transfers, ambulation, strengthening, balance, and endurance. The patient to wear back brace when OOB. Patient to continuee to focus on L ankle dorsiflexion strength. Continue OT for a minimum of 1.5 hours a day, at least 5 out of 7 days per week to address ADL (bathing, LE dressing, toileting) safety; energy conservations and adaptive equipment as needed.     Continue 24-hour skilled rehabilitation nursing for bowel and bladder management, skin care for decubitus ulcer prevention , pain management and ongoing medication administration      The patient may benefit from a psychology consult for depression, adjustment disorder. Hx of DVT/ PE, coagulopathy - Anticoagulation management - on long term coumadin secondary to recurrent DVTs/PE. - Continue coumadin, INR- 2.3 on 8/14, lovenox stopped. - pharmacy assisting with dosing. INR 2.3->2.0 ( 8/15)->3.1(8/16). Coumadin dose reduced tonight. Checking daily. - conitnue to monitor closely    Spinal stenosis -  Begun on prednisone PTA for a few weeks. Prednisone tapered, discontinued. HTN -  Controlled on bystolic. BP in good range. Bowel mgmt - on scheduled senokot , prn dulcolax, MOM    GI prophylaxis - on pepcid bid    Pain mgmt- advance to oxycodone 5-15mg q4prn, flexeril prn, yesterday received total 45mg of oxycodone. Fair control. Continue to monitor, assess pain level. Insomnia - prn trazodone    Acute blood loss anemia - Hgb - 11.8 > 12.2 on 8/13. Monitor.   - macrocytic anemia. Will check B12, folate, serum iron, ferritin, bilirubin elevated    Time spent was 25 minutes with over 1/2 in direct patient care/examination, consultation and coordination of care.     Signed By: Frances Sherwood MD     August 16, 2017

## 2017-08-16 NOTE — PROGRESS NOTES
Dr Libby Claros on floor, in to see patient. Pt up to shower, Dr Libby Claros visualized wound. Pt requested that Dr Arthur Don stop by to see him before he is discharged to view wound and provide diagnosis and prognosis per pt. Dr Libby Claros advised pt that he would call him.

## 2017-08-16 NOTE — PROGRESS NOTES
Pt /o pain 8/10 to back. Pt was given oxycodone 15 mg po with sips of water. Primary nurse was notified of pain medication given.

## 2017-08-16 NOTE — PROGRESS NOTES
Subjective \"I want to be able to get back to work. \"   Activity Evaluation   Strength/Endurance Patient with decreased strength in his legs but strong UE's. He was very active prior admit and is motivated to get back to his prior level of functioning. Balance Sit<->stand:  Mod (A) with RW; wears back brace when OOB. Social Interaction Patient was friendly and conversational during the session. Cognitive A&O X4   Comments Patient was handed off to Callum, OT at the end of the session. Patient's Recreational Therapy evaluation completed under the [facility] navigation tool on 8/16/17. Please see for specifics regarding plan of care and goals. Patient prefers to be called \"Filiberto. \" Thank you for the referral.  Hien Vieyra, CTRS

## 2017-08-16 NOTE — PROGRESS NOTES
Pt laying on left side in bed for comfort, pillows utilized for positioning.  Hourly rounding completed throughout shift

## 2017-08-16 NOTE — PROGRESS NOTES
Warfarin dosing per pharmacist    Arielle Fisher is a 63 y.o. male. Indication:  Hx recurrent DVTs and PE    Goal INR:  2.0 - 3.0    Home dose:  10 mg qhs    Risk factors or significant drug interactions:  none    Other anticoagulants:      Daily Monitoring  Date  INR     Warfarin dose  HGB              Notes  8/9  1.0  10 mg   14.3  8/10  1.0  10 mg   12.7  8/11  1.4  10 mg   11.8  8/12  1.4  10 mg   ---  8/13  1.9  10 mg   12.2  8/14  2.3  10 mg    ---  8/15  2.0  2 mg + 10 mg  ---  8/16  3.1  5 mg   ---    Pharmacy consulted to assist with warfarin dosing on 8/13. His warfarin was being held for surgery on 8/8 and was resumed on 8/9. INR with sudden significant increase today after giving small extra dose yesterday, now at 3.1. Based on previous dosing, would not have expected such a large increase in INR with only an extra 2 mg yesterday. As INR is up significantly in one day, will give a smaller dose of 5 mg this evening with the goal of allowing INR to fall but not fall to below therapeutic goal.    Continue to follow INR daily for now. Pharmacy will continue to follow. Please call with any questions.       Thank you,  Esther Cotter, PharmD  Clinical Pharmacist  [phone]

## 2017-08-16 NOTE — PROGRESS NOTES
PHYSICAL THERAPY DAILY NOTE  Time In: 0830  Time Out: 1001  Patient Seen For: AM;Transfer training;Gait training; Therapeutic exercise; Other (see progress notes)    Subjective: Patient had no complaints. Objective: No pain noted. Pain meds given prior to treatment. Back brace on . Spinal (back brace on when out of bed, fall precautions)  GROSS ASSESSMENT Daily Assessment            BED/MAT MOBILITY Daily Assessment    Supine to Sit : 4 (Minimal assistance)  Sit to Supine : 4 (Minimal assistance)       TRANSFERS Daily Assessment    Transfer Type: SPT with walker  Transfer Assistance : 3 (Moderate assistance )  Sit to Stand Assistance: Moderate assistance       GAIT Daily Assessment    Amount of Assistance: 3 (Moderate assistance)  Distance (ft): 60 Feet (ft)  Assistive Device: Gait belt;Walker, rolling;Brace/Splint       STEPS or STAIRS Daily Assessment    Level of Assist : 0 (Not tested)       BALANCE Daily Assessment            WHEELCHAIR MOBILITY Daily Assessment            LOWER EXTREMITY EXERCISES Daily Assessment    Extremity: Both  Exercise Type #1: Supine lower extremity strengthening  Sets Performed: 1  Reps Performed: 20  Level of Assist: Minimal assistance  Exercise Type #2: Seated lower extremity strengthening  Sets Performed: 1  Reps Performed: 20  Level of Assist: Supervision          Assessment: Patient making good progress. Plan of Care: Continue with plan of care to reach PT goals. To recreational therapy after treatment.     Judy Akbar, KATH  8/16/2017

## 2017-08-16 NOTE — PROGRESS NOTES
PHYSICAL THERAPY DAILY NOTE  Time In: 1300  Time Out: 1757  Patient Seen For: PM;Other (see progress notes); Therapeutic exercise;Gait training;Transfer training    Subjective: Patient had no complaints. Objective: Back brace on. Spinal (back brace on when out of bed, fall precautions)  GROSS ASSESSMENT Daily Assessment            BED/MAT MOBILITY Daily Assessment    Supine to Sit : 4 (Minimal assistance)  Sit to Supine : 4 (Minimal assistance)       TRANSFERS Daily Assessment    Transfer Type: SPT with walker  Transfer Assistance : 3 (Moderate assistance )  Sit to Stand Assistance: Moderate assistance       GAIT Daily Assessment   Flexed posture. Requires toe off to assist DF on right. Amount of Assistance: 3 (Moderate assistance)  Distance (ft): 60 Feet (ft)  Assistive Device: Gait belt;Brace/Splint; Walker, rolling       STEPS or STAIRS Daily Assessment    Level of Assist : 0 (Not tested)       BALANCE Daily Assessment            WHEELCHAIR MOBILITY Daily Assessment            LOWER EXTREMITY EXERCISES Daily Assessment    Extremity: Both  Exercise Type #1: Other (comment) (motomed x 10 min)  Sets Performed: 1  Reps Performed: 20  Level of Assist: Minimal assistance  Exercise Type #2: Seated lower extremity strengthening  Sets Performed: 1  Reps Performed: 20  Level of Assist: Supervision          Assessment: Patient making good progress. Plan of Care: Continue with plan of care to reach PT goals. Returned to room to bed with call bell at reach.     Kodi Mitchell, PTA  8/16/2017

## 2017-08-17 LAB
BASOPHILS # BLD: 0 K/UL (ref 0–0.2)
BASOPHILS NFR BLD: 0 % (ref 0–2)
DIFFERENTIAL METHOD BLD: ABNORMAL
EOSINOPHIL # BLD: 0.1 K/UL (ref 0–0.8)
EOSINOPHIL NFR BLD: 1 % (ref 0.5–7.8)
ERYTHROCYTE [DISTWIDTH] IN BLOOD BY AUTOMATED COUNT: 14 % (ref 11.9–14.6)
FERRITIN SERPL-MCNC: 227 NG/ML (ref 8–388)
FOLATE SERPL-MCNC: 12 NG/ML (ref 3.1–17.5)
HCT VFR BLD AUTO: 35.8 % (ref 41.1–50.3)
HGB BLD-MCNC: 12.2 G/DL (ref 13.6–17.2)
IMM GRANULOCYTES # BLD: 0 K/UL (ref 0–0.5)
IMM GRANULOCYTES NFR BLD: 0.2 % (ref 0–5)
INR PPP: 3.2 (ref 0.9–1.2)
IRON SERPL-MCNC: 44 UG/DL (ref 35–150)
LDH SERPL L TO P-CCNC: 195 U/L (ref 100–190)
LYMPHOCYTES # BLD: 1.3 K/UL (ref 0.5–4.6)
LYMPHOCYTES NFR BLD: 25 % (ref 13–44)
MCH RBC QN AUTO: 34 PG (ref 26.1–32.9)
MCHC RBC AUTO-ENTMCNC: 34.1 G/DL (ref 31.4–35)
MCV RBC AUTO: 99.7 FL (ref 79.6–97.8)
MONOCYTES # BLD: 0.5 K/UL (ref 0.1–1.3)
MONOCYTES NFR BLD: 9 % (ref 4–12)
NEUTS SEG # BLD: 3.2 K/UL (ref 1.7–8.2)
NEUTS SEG NFR BLD: 65 % (ref 43–78)
PLATELET # BLD AUTO: 127 K/UL (ref 150–450)
PMV BLD AUTO: 9.2 FL (ref 10.8–14.1)
PROTHROMBIN TIME: 35.1 SEC (ref 9.6–12)
RBC # BLD AUTO: 3.59 M/UL (ref 4.23–5.67)
VIT B12 SERPL-MCNC: 1019 PG/ML (ref 193–986)
WBC # BLD AUTO: 5.1 K/UL (ref 4.3–11.1)

## 2017-08-17 PROCEDURE — 83615 LACTATE (LD) (LDH) ENZYME: CPT | Performed by: PHYSICAL MEDICINE & REHABILITATION

## 2017-08-17 PROCEDURE — 97110 THERAPEUTIC EXERCISES: CPT

## 2017-08-17 PROCEDURE — 82728 ASSAY OF FERRITIN: CPT | Performed by: PHYSICAL MEDICINE & REHABILITATION

## 2017-08-17 PROCEDURE — 82607 VITAMIN B-12: CPT | Performed by: PHYSICAL MEDICINE & REHABILITATION

## 2017-08-17 PROCEDURE — 74011250637 HC RX REV CODE- 250/637: Performed by: PHYSICAL MEDICINE & REHABILITATION

## 2017-08-17 PROCEDURE — 97530 THERAPEUTIC ACTIVITIES: CPT

## 2017-08-17 PROCEDURE — 65310000000 HC RM PRIVATE REHAB

## 2017-08-17 PROCEDURE — 97112 NEUROMUSCULAR REEDUCATION: CPT

## 2017-08-17 PROCEDURE — 85025 COMPLETE CBC W/AUTO DIFF WBC: CPT | Performed by: PHYSICAL MEDICINE & REHABILITATION

## 2017-08-17 PROCEDURE — 85610 PROTHROMBIN TIME: CPT | Performed by: PHYSICAL MEDICINE & REHABILITATION

## 2017-08-17 PROCEDURE — 83540 ASSAY OF IRON: CPT | Performed by: PHYSICAL MEDICINE & REHABILITATION

## 2017-08-17 PROCEDURE — 82746 ASSAY OF FOLIC ACID SERUM: CPT | Performed by: PHYSICAL MEDICINE & REHABILITATION

## 2017-08-17 PROCEDURE — 97116 GAIT TRAINING THERAPY: CPT

## 2017-08-17 PROCEDURE — 36415 COLL VENOUS BLD VENIPUNCTURE: CPT | Performed by: PHYSICAL MEDICINE & REHABILITATION

## 2017-08-17 RX ADMIN — TRAZODONE HYDROCHLORIDE 50 MG: 50 TABLET ORAL at 22:44

## 2017-08-17 RX ADMIN — STANDARDIZED SENNA CONCENTRATE AND DOCUSATE SODIUM 2 TABLET: 8.6; 5 TABLET, FILM COATED ORAL at 17:02

## 2017-08-17 RX ADMIN — OXYCODONE HYDROCHLORIDE 15 MG: 5 TABLET ORAL at 17:08

## 2017-08-17 RX ADMIN — OXYCODONE HYDROCHLORIDE 15 MG: 5 TABLET ORAL at 21:32

## 2017-08-17 RX ADMIN — WARFARIN SODIUM 5 MG: 5 TABLET ORAL at 21:33

## 2017-08-17 RX ADMIN — OXYCODONE HYDROCHLORIDE 15 MG: 5 TABLET ORAL at 13:17

## 2017-08-17 RX ADMIN — NEBIVOLOL HYDROCHLORIDE 5 MG: 5 TABLET ORAL at 08:28

## 2017-08-17 RX ADMIN — OXYCODONE HYDROCHLORIDE 15 MG: 5 TABLET ORAL at 05:46

## 2017-08-17 RX ADMIN — STANDARDIZED SENNA CONCENTRATE AND DOCUSATE SODIUM 2 TABLET: 8.6; 5 TABLET, FILM COATED ORAL at 08:27

## 2017-08-17 RX ADMIN — CYCLOBENZAPRINE HYDROCHLORIDE 5 MG: 10 TABLET, FILM COATED ORAL at 22:44

## 2017-08-17 RX ADMIN — FAMOTIDINE 20 MG: 20 TABLET ORAL at 08:28

## 2017-08-17 RX ADMIN — FAMOTIDINE 20 MG: 20 TABLET ORAL at 17:02

## 2017-08-17 NOTE — PROGRESS NOTES
PHYSICAL THERAPY DAILY NOTE  Time In: 1311  Time Out: 2596  Patient Seen For: PM;Gait training;Patient education; Therapeutic exercise;Transfer training; Other (see progress notes)    Subjective: Patient reporting he was late eating his lunch because he fell asleep after AM therapy. Reports he is hurting more this PM and requested pain medication. Reports he thinks he \"strained\" his right hip muscle         Objective:Vital Signs:  Patient Vitals for the past 12 hrs:   Temp Pulse Resp BP SpO2   08/17/17 1507 98.7 °F (37.1 °C) 79 18 111/67 95 %   08/17/17 0717 98.3 °F (36.8 °C) 71 17 105/63 90 %     Pain level: 2 to 7 out of 10  Pain location:low back, right hip flexor  Pain interventions:Pain medication, rest, positioning,body mechanics    Patient education:Bed mobility training,transfer training, gait training, fall precautions, activity pacing, spinal precautions, body mechanics, Patient verbalizing understanding and demonstrating partial understanding of patient education. Recommend follow up education. Interdisciplinary Communication:RN issued patient pain medication during PM PT    Spinal (back brace on when out of bed, fall precautions)  GROSS ASSESSMENT Daily Assessment     Verbal cues to apply back brace.  Independent with removing brace       BED/MAT MOBILITY Daily Assessment   Increased time and effort to complete with cues for log rolling and supine<>sidelying<>sit Rolling Right : 0 (Not tested)  Rolling Left : 5 (Supervision) (cues for body mechanics, using bed railing)  Supine to Sit : 5 (Stand-by assistance) (using bed railing)  Sit to Supine : 4 (Minimal assistance) (with RLE)       TRANSFERS Daily Assessment   Verbal cues for body mechanics and improved quad eccentric control durign stand to sit Transfer Type: SPT with walker  Transfer Assistance : 4 (Minimal assistance)  Sit to Stand Assistance: Minimal assistance  Car Transfers: Not tested       GAIT Daily Assessment    Amount of Assistance: 4 (Minimal assistance)  Distance (ft): 75 Feet (ft) (75ft x 2)  Assistive Device: Walker, rolling;Gait belt;Orthotic device (Back brace, [place] Toe UP device Right foot)   Gait training facilitating upright posture and shorter step length for improved postural control during stance phase. Cues to inhibit right trendelenburg during stance phase. STEPS or STAIRS Daily Assessment    Steps/Stairs Ambulated (#): 0  Level of Assist : 0 (Not tested)       BALANCE Daily Assessment   Static standing with RW facilitating upright posture Sitting - Static: Good (unsupported)  Sitting - Dynamic: Good (unsupported)  Standing - Static: Fair (with RW)  Standing - Dynamic : Impaired       WHEELCHAIR MOBILITY Daily Assessment    Curbs/Ramps Assist Required (FIM Score): 0 (Not tested)  Wheelchair Setup Assist Required : 0 (Not tested)       LOWER EXTREMITY EXERCISES Daily Assessment    Extremity: Both  Exercise Type #1: Supine lower extremity strengthening  Sets Performed: 2  Reps Performed: 10  Level of Assist: Minimal assistance (with RLE)          Assessment: Progress with gait is slow due to extent of RLE weakness,core weakness and ongoing back pain         Plan of Care: Continue with POC and progress as tolerated.      Norma Lab, PT  8/17/2017

## 2017-08-17 NOTE — PROGRESS NOTES
Patient resting up in bed. Alert and oriented. Lung sounds clear. S1S2, bowel sounds active. Dressing to spine intact and dry. Moving self in bed. No complaint of discomfort at present time. Assessment completed. No other verbalized needs at present time. See doc flow sheet for further assessments.

## 2017-08-17 NOTE — PROGRESS NOTES
Subjective \"I slept ok but just not for a long length of time. \"   Activity UE exercises with 3 lb weighted bar   Strength/Endurance Patient tolerated the session well. He did c/o soreness in his shoulders which was r/t to his fall. Balance NT   Social Interaction Friendly and conversational during the session. He initiated conversation with this therapist.   Cognitive A&O X4   Comments Patient was left in the gym awaiting Taras Hammans, PT.      Hien Vieyra, CTRS

## 2017-08-17 NOTE — PROGRESS NOTES
Problem: Falls - Risk of  Goal: *Absence of Falls  Document Erick Fall Risk and appropriate interventions in the flowsheet.    Outcome: Progressing Towards Goal  Fall Risk Interventions:  Mobility Interventions: Communicate number of staff needed for ambulation/transfer, PT Consult for mobility concerns, Patient to call before getting OOB, OT consult for ADLs, Strengthening exercises (ROM-active/passive), Utilize walker, cane, or other assitive device, PT Consult for assist device competence     Mentation Interventions: Adequate sleep, hydration, pain control, Door open when patient unattended, Evaluate medications/consider consulting pharmacy     Medication Interventions: Patient to call before getting OOB, Assess postural VS orthostatic hypotension     Elimination Interventions: Call light in reach     History of Falls Interventions: Door open when patient unattended

## 2017-08-17 NOTE — PROGRESS NOTES
Team conference; discharge scheduled for 8/23 with [place] [facility] recommended. Discussed with pt; in agreement. Continue to follow.

## 2017-08-17 NOTE — PROGRESS NOTES
Assessment completed, respiration even and unlabored, instructed to call for needs or assist , educated on risk and needs not to get up without assist, call light in reach, inr 3.1 , notified pharmacy, talked with Zoran Guzman, was told they decreased   dose, Coumadin now 5mg instead of 10mg, ok to give, pt denies any c/o at present, voids continently

## 2017-08-17 NOTE — PROGRESS NOTES
Warfarin dosing per pharmacist    Sonam Hassan is a 63 y.o. male. Indication:  Hx recurrent DVTs and PE    Goal INR:  2.0 - 3.0    Home dose:  10 mg qhs    Risk factors or significant drug interactions:  none    Other anticoagulants:      Daily Monitoring  Date  INR     Warfarin dose  HGB              Notes  8/9  1.0  10 mg   14.3  8/10  1.0  10 mg   12.7  8/11  1.4  10 mg   11.8  8/12  1.4  10 mg   ---  8/13  1.9  10 mg   12.2  8/14  2.3  10 mg    ---  8/15  2.0  2 mg + 10 mg  ---  8/16  3.1  5 mg   ---  8/17  3.2  5 mg   12.2    Pharmacy consulted to assist with warfarin dosing on 8/13. His warfarin was being held for surgery on 8/8 and was resumed on 8/9. INR still trending up, but much more slowly now. Up to 3.2 today from 3.1 yesterday. Will give a lower dose of 5 mg again tonight in anticipation that the INR will begin to trend down soon. Continue to follow INR daily for now. Pharmacy will continue to follow. Please call with any questions.       Thank you,  Georgina Lockwood, PharmD  Clinical Pharmacist  [phone]

## 2017-08-17 NOTE — PROGRESS NOTES
PHYSICAL THERAPY DAILY NOTE  Time In: 0915  Time Out: 1001  Patient Seen For: AM;Transfer training;Gait training; Therapeutic exercise; Other (see progress notes)    Subjective: Patient did complain about right groin pain. Objective: $ out of 10 on pain scale. Spinal (back brace on when out of bed, fall precautions)  GROSS ASSESSMENT Daily Assessment            BED/MAT MOBILITY Daily Assessment    Supine to Sit : 4 (Minimal assistance)  Sit to Supine : 4 (Minimal assistance)       TRANSFERS Daily Assessment    Transfer Type: SPT with walker  Transfer Assistance : 3 (Moderate assistance )  Sit to Stand Assistance: Moderate assistance       GAIT Daily Assessment    Amount of Assistance: 3 (Moderate assistance)  Distance (ft): 60 Feet (ft)  Assistive Device: Gait belt;Brace/Splint; Walker, rolling (dept toe off on right)       STEPS or STAIRS Daily Assessment    Level of Assist : 0 (Not tested)       BALANCE Daily Assessment            WHEELCHAIR MOBILITY Daily Assessment            LOWER EXTREMITY EXERCISES Daily Assessment    Extremity: Both  Exercise Type #1: Seated lower extremity strengthening  Sets Performed: 1  Reps Performed: 20  Level of Assist: Minimal assistance          Assessment: Patient making good progress. Plan of Care: Continue with plan of care to reach PT goals.      Brooks Christianson, PTA  8/17/2017

## 2017-08-17 NOTE — PROGRESS NOTES
OT Daily Note  Time In 0743   Time Out 0830   Spinal precautions, LSO OOB  Pain: Patient had no complaint of pain. Functional Mobility   Pt transferred to w/c from bed with SBA. Self-Care   Pt donned shoes and LSO with S/U. Activity Tolerance   Pt stood for approximately 4 minutes and 1 minute to improve standing activity tolerance while engaging in B hand activity. Pt completed 4 minutes on the ergometer frontwards and backwards with moderate resistance to increase UB strength and activity tolerance for integration into self-care. Pt did not require a rest break. Pt completed 10 AAROM on wyatt-glide with fair quality. Pt completed 15 anterior punches B without pain. Education   Icing shoulder     Interdisciplinary Communication: HARSHAL Moralez November on pt's performance    Plan: Continue with POC. Pt was left awaiting RT Yan Montez OTR/STANLEY  8/17/2017

## 2017-08-17 NOTE — PROGRESS NOTES
OT Daily Note  Time In 1031   Time Out 1115     Pain: Patient had no complaint of pain. Functional Mobility   Pt transferred with CGA and went from sit to supine with min A for BLE management. Neuro-Muscular Re-Education   Pt stood 2x with 2'' step under LLE to promote proprioception in RLE. Pt engaged in B hand activity during session. Activity Tolerance   Pt engaged with medium soft putty with 20 medium items to improve sitting activity tolerance. Pt demonstrated good quality during activity. Pt completed 10 minutes on the NuStep on Level 2-4 and went 0.38 miles. Education   Proprioception      Interdisciplinary Communication: Team conference    Plan: Continue with POC. Pt was left in bed with all needs within reach.      Cameron Newby OTR/L  8/17/2017

## 2017-08-17 NOTE — PROGRESS NOTES
Problem: Falls - Risk of  Goal: *Absence of Falls  Document Erick Fall Risk and appropriate interventions in the flowsheet.    Outcome: Progressing Towards Goal  Fall Risk Interventions:  Mobility Interventions: Patient to call before getting OOB     Mentation Interventions: Adequate sleep, hydration, pain control     Medication Interventions: Patient to call before getting OOB     Elimination Interventions: Call light in reach     History of Falls Interventions: Door open when patient unattended

## 2017-08-17 NOTE — PROGRESS NOTES
Physical Medicine and Rehabilitation Progress Note    Elly Greco  Admit Date: 8/12/2017  Admit Diagnosis: Post Spinal Surgery Rehab;Spinal stenosis of lumbar region    Subjective: Patient seen and examined. Vss. Afebrile. Patient making excellent funcitonal gains. No new neurologic setbacks. No new pain source or increased intensity. Back incision appears benign. Objective:     Current Facility-Administered Medications   Medication Dose Route Frequency    warfarin (COUMADIN) tablet 5 mg  5 mg Oral QHS    oxyCODONE IR (ROXICODONE) tablet 5-15 mg  5-15 mg Oral Q4H PRN    traZODone (DESYREL) tablet 50 mg  50 mg Oral QHS PRN    senna-docusate (PERICOLACE) 8.6-50 mg per tablet 2 Tab  2 Tab Oral BID    bisacodyl (DULCOLAX) suppository 10 mg  10 mg Rectal DAILY PRN    bisacodyl (DULCOLAX) tablet 5 mg  5 mg Oral DAILY PRN    cyclobenzaprine (FLEXERIL) tablet 5 mg  5 mg Oral TID PRN    magnesium hydroxide (MILK OF MAGNESIA) 400 mg/5 mL oral suspension 30 mL  30 mL Oral DAILY PRN    alum-mag hydroxide-simeth (MYLANTA) oral suspension 30 mL  30 mL Oral Q4H PRN    [START ON 8/18/2017] testosterone enanthate (DELATESTRYL) injection 200 mg  200 mg IntraMUSCular Q 14 DAYS    famotidine (PEPCID) tablet 20 mg  20 mg Oral BID    ondansetron (ZOFRAN ODT) tablet 4 mg  4 mg Oral Q6H PRN    nebivolol (BYSTOLIC) tablet 5 mg  5 mg Oral DAILY     Allergies   Allergen Reactions    Adhesive Contact Dermatitis       Visit Vitals    /63 (BP 1 Location: Right arm, BP Patient Position: At rest)    Pulse 71    Temp 98.3 °F (36.8 °C)    Resp 17    SpO2 90%      Intake and Output:  08/15 1901 - 08/17 0700  In: 480 [P.O.:480]  Out: 1200 [Urine:1200]    Physical Exam:   General:  Alert, NAD,  resting comfortably in bed       Lungs:  CTA Bilaterally, decreased R LL breath sounds. Heart:  Regular rate and rhythm, no murmur        Abdomen:  Soft, bowel sounds present. .    Neuromuscular:  Left Upper Extremity MMT: 5/5  Sensation: intact     Right Upper Extremity MMT:  5/5      Sensation: intact     Left Lower Extremity MMT:   Hip Flexion: 4-  Knee extension: 5-/5  Ankle Dorsi flexion: 3-/5  EHL: 3-/5  Ankle Plantar Flexion: 4-/5  Sensation: intact     Right Lower Extremity MMT:  Hip Flexion: 4-/5  Knee extension: 4+/5  Ankle Dorsi flexion 4/5  EHL: 4/5  Ankle Plantar Flexion: 4+/5  Sensation: intact   Skin/extremities:  Intact, bilateral SRINATH stockings in place, calves soft, no LE edema       Incision:   dressing intact - clean, dry, no marginal erythema, no rashes or lesions     Functional Assessment:          Balance  Sitting - Static: Good (unsupported) (08/13/17 1100)  Sitting - Dynamic: Fair (occasional) (08/13/17 1100)  Standing - Static: Fair (08/13/17 1100)       Lee Ann Nagy Fall Risk Assessment:  Lee Ann Nagy Fall Risk  Mobility: Ambulates or transfers with assist devices or assistance/unsteady gait (08/16/17 2030)  Mobility Interventions: Patient to call before getting OOB (08/16/17 2030)  Mentation: Alert, oriented x 3 (08/16/17 2030)  Mentation Interventions: Adequate sleep, hydration, pain control (08/16/17 2030)  Medication: Patient receiving anticonvulsants, sedatives(tranquilizers), psychotropics or hypnotics, hypoglycemics, narcotics, sleep aids, antihypertensives, laxatives, or diuretics (08/16/17 2030)  Medication Interventions: Patient to call before getting OOB (08/16/17 2030)  Elimination: Needs assistance with toileting (08/16/17 2030)  Elimination Interventions: Call light in reach (08/16/17 2030)  Prior Fall History: No (08/16/17 2030)  History of Falls Interventions: Door open when patient unattended (08/15/17 1928)  Total Score: 3 (08/16/17 2030)  Standard Fall Precautions: Yes (08/16/17 2030)  High Fall Risk: Yes (08/15/17 1928)     Speech Assessment:         Ambulation:  Gait  Distance (ft): 60 Feet (ft) (08/16/17 1622)  Assistive Device: Gait belt;Brace/Splint; Walker, rolling (08/16/17 1622) Labs/Studies:  Recent Results (from the past 72 hour(s))   PROTHROMBIN TIME + INR    Collection Time: 08/15/17  6:28 AM   Result Value Ref Range    Prothrombin time 21.7 (H) 9.6 - 12.0 sec    INR 2.0 (H) 0.9 - 1.2     PROTHROMBIN TIME + INR    Collection Time: 08/16/17  6:20 AM   Result Value Ref Range    Prothrombin time 34.1 (H) 9.6 - 12.0 sec    INR 3.1 (H) 0.9 - 1.2     PROTHROMBIN TIME + INR    Collection Time: 08/17/17  6:20 AM   Result Value Ref Range    Prothrombin time 35.1 (H) 9.6 - 12.0 sec    INR 3.2 (H) 0.9 - 1.2     CBC WITH AUTOMATED DIFF    Collection Time: 08/17/17  6:20 AM   Result Value Ref Range    WBC 5.1 4.3 - 11.1 K/uL    RBC 3.59 (L) 4.23 - 5.67 M/uL    HGB 12.2 (L) 13.6 - 17.2 g/dL    HCT 35.8 (L) 41.1 - 50.3 %    MCV 99.7 (H) 79.6 - 97.8 FL    MCH 34.0 (H) 26.1 - 32.9 PG    MCHC 34.1 31.4 - 35.0 g/dL    RDW 14.0 11.9 - 14.6 %    PLATELET 284 (L) 172 - 450 K/uL    MPV 9.2 (L) 10.8 - 14.1 FL    DF AUTOMATED      NEUTROPHILS 65 43 - 78 %    LYMPHOCYTES 25 13 - 44 %    MONOCYTES 9 4.0 - 12.0 %    EOSINOPHILS 1 0.5 - 7.8 %    BASOPHILS 0 0.0 - 2.0 %    IMMATURE GRANULOCYTES 0.2 0.0 - 5.0 %    ABS. NEUTROPHILS 3.2 1.7 - 8.2 K/UL    ABS. LYMPHOCYTES 1.3 0.5 - 4.6 K/UL    ABS. MONOCYTES 0.5 0.1 - 1.3 K/UL    ABS. EOSINOPHILS 0.1 0.0 - 0.8 K/UL    ABS. BASOPHILS 0.0 0.0 - 0.2 K/UL    ABS. IMM. GRANS. 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 He continues with gait dysfunction, mobility and self care impairments.      Rehabilitation Plan  Continue PT for a minimum of 1.5 hours a day, at least 5 out of 7 days per week to address bed mobility, transfers, ambulation, strengthening, balance, and endurance. The patient to wear back brace when OOB.  Patient to continuee to focus on L ankle dorsiflexion strength. Continue OT for a minimum of 1.5 hours a day, at least 5 out of 7 days per week to address ADL (bathing, LE dressing, toileting) safety; energy conservations and adaptive equipment as needed.     Continue 24-hour skilled rehabilitation nursing for bowel and bladder management, skin care for decubitus ulcer prevention , pain management and ongoing medication administration      The patient may benefit from a psychology consult for depression, adjustment disorder. Hx of DVT/ PE, coagulopathy - Anticoagulation management - on long term coumadin secondary to recurrent DVTs/PE. - Continue coumadin, INR- 2.3 on 8/14, lovenox stopped. - pharmacy assisting with dosing. INR 2.3->2.0 ( 8/15)->3.1(8/16). Coumadin dose reduced tonight. Checking daily. - conitnue to monitor closely    Spinal stenosis -  Begun on prednisone PTA for a few weeks. Prednisone tapered, discontinued. HTN -  Controlled on bystolic. BP in good range. Bowel mgmt - on scheduled senokot , prn dulcolax, MOM    GI prophylaxis - on pepcid bid    Pain mgmt- advance to oxycodone 5-15mg q4prn, flexeril prn, yesterday received total 45mg of oxycodone. Fair control. Continue to monitor, assess pain level. Insomnia - prn trazodone    Acute blood loss anemia - Hgb - 11.8 > 12.2 on 8/13. Monitor.   - macrocytic anemia. Will check B12, folate, serum iron, ferritin, bilirubin elevated    Time spent was 25 minutes with over 1/2 in direct patient care/examination, consultation and coordination of care.     Signed By: Di Hamilton MD     August 17, 2017

## 2017-08-18 LAB
INR PPP: 2.2 (ref 0.9–1.2)
PROTHROMBIN TIME: 24.3 SEC (ref 9.6–12)

## 2017-08-18 PROCEDURE — 65310000000 HC RM PRIVATE REHAB

## 2017-08-18 PROCEDURE — 97116 GAIT TRAINING THERAPY: CPT

## 2017-08-18 PROCEDURE — 74011250637 HC RX REV CODE- 250/637: Performed by: PHYSICAL MEDICINE & REHABILITATION

## 2017-08-18 PROCEDURE — 97535 SELF CARE MNGMENT TRAINING: CPT

## 2017-08-18 PROCEDURE — 97110 THERAPEUTIC EXERCISES: CPT

## 2017-08-18 PROCEDURE — 97530 THERAPEUTIC ACTIVITIES: CPT

## 2017-08-18 PROCEDURE — 97150 GROUP THERAPEUTIC PROCEDURES: CPT

## 2017-08-18 PROCEDURE — 85610 PROTHROMBIN TIME: CPT | Performed by: PHYSICAL MEDICINE & REHABILITATION

## 2017-08-18 RX ORDER — WARFARIN 10 MG/1
10 TABLET ORAL
Status: DISCONTINUED | OUTPATIENT
Start: 2017-08-19 | End: 2017-08-30 | Stop reason: HOSPADM

## 2017-08-18 RX ORDER — WARFARIN SODIUM 5 MG/1
5 TABLET ORAL 2 TIMES DAILY
Status: COMPLETED | OUTPATIENT
Start: 2017-08-18 | End: 2017-08-18

## 2017-08-18 RX ADMIN — STANDARDIZED SENNA CONCENTRATE AND DOCUSATE SODIUM 2 TABLET: 8.6; 5 TABLET, FILM COATED ORAL at 17:53

## 2017-08-18 RX ADMIN — OXYCODONE HYDROCHLORIDE 15 MG: 5 TABLET ORAL at 20:57

## 2017-08-18 RX ADMIN — NEBIVOLOL HYDROCHLORIDE 5 MG: 5 TABLET ORAL at 08:16

## 2017-08-18 RX ADMIN — FAMOTIDINE 20 MG: 20 TABLET ORAL at 17:53

## 2017-08-18 RX ADMIN — OXYCODONE HYDROCHLORIDE 15 MG: 5 TABLET ORAL at 16:30

## 2017-08-18 RX ADMIN — OXYCODONE HYDROCHLORIDE 15 MG: 5 TABLET ORAL at 05:32

## 2017-08-18 RX ADMIN — FAMOTIDINE 20 MG: 20 TABLET ORAL at 08:16

## 2017-08-18 RX ADMIN — WARFARIN SODIUM 5 MG: 5 TABLET ORAL at 10:02

## 2017-08-18 RX ADMIN — WARFARIN SODIUM 5 MG: 5 TABLET ORAL at 20:56

## 2017-08-18 RX ADMIN — TRAZODONE HYDROCHLORIDE 50 MG: 50 TABLET ORAL at 21:31

## 2017-08-18 RX ADMIN — STANDARDIZED SENNA CONCENTRATE AND DOCUSATE SODIUM 2 TABLET: 8.6; 5 TABLET, FILM COATED ORAL at 08:16

## 2017-08-18 NOTE — PROGRESS NOTES
Warfarin dosing per pharmacist    Gopal Hunter is a 63 y.o. male. Indication:  Hx recurrent DVTs and PE    Goal INR:  2.0 - 3.0    Home dose:  10 mg qhs    Risk factors or significant drug interactions:  none    Other anticoagulants:      Daily Monitoring  Date  INR     Warfarin dose  HGB              Notes  8/9  1.0  10 mg   14.3  8/10  1.0  10 mg   12.7  8/11  1.4  10 mg   11.8  8/12  1.4  10 mg   ---  8/13  1.9  10 mg   12.2  8/14  2.3  10 mg    ---  8/15  2.0  2 mg + 10 mg  ---  8/16  3.1  5 mg   ---  8/17  3.2  5 mg   12.2  8/18  2.2  5 mg + 5 mg  ---    Pharmacy consulted to assist with warfarin dosing on 8/13. His warfarin was being held for surgery on 8/8 and was resumed on 8/9. INR trending back down today as anticipated and is now at 2.2. In an attempt to prevent INR from dropping below therapeutic range, will give 5 mg now and 5 mg this evening for a total dose of 10 mg today. Anticipate resuming regular dose of 10 mg daily starting tomorrow depending on trend in the INR. Continue to follow INR daily for now. Pharmacy will continue to follow. Please call with any questions.       Thank you,  Genesis Fall, PharmD  Clinical Pharmacist  [phone]

## 2017-08-18 NOTE — PROGRESS NOTES
OT Daily Note  Time In 1345   Time Out 1430     Pain: Patient had no complaint of pain. Functional Mobility   Pt walked to gym with CGA with RW. Self-Care   Pt toileted with CGA. Pt's elastic shoelace was replaced with regular shoe lace in R shoe for toe-off. Activity Tolerance   Pt engaged with rubberband board to build UB activity tolerance and problem solving abilities to allow pt to complete simple IADL more safely without fatigue. Pt stood for approximately 10 minutes with SBA while engaging in card game. Pt occasionally had to get balance with one hand. Education   Ways to adapt shoes     Plan: Continue with POC. Pt was left with PT Mary Jay with all needs within reach.      Lamin Donaldson OTR/L  8/18/2017

## 2017-08-18 NOTE — PROGRESS NOTES
C/o pain to lower back, pt given oxycodone 15 mg po for pain 7/10 per pain scale. Pt encourage to call for any needs. Call light with in reach.

## 2017-08-18 NOTE — PROGRESS NOTES
Pt resting comfortably in bed, he c/o pain about a 6/10 to lower back. Pt was given pain medication this morning at 5:30. Will continue to monitor pain results. Assessment completed.

## 2017-08-18 NOTE — PROGRESS NOTES
PHYSICAL THERAPY DAILY NOTE  Time In: 1543  Time Out: 6619  Patient Seen For: AM;Balance activities;Gait training; Therapeutic exercise    Subjective: \"My morning has been pretty good today\"         Objective:  Vital Signs:    Patient Vitals for the past 12 hrs:   Temp Pulse Resp BP SpO2   08/18/17 0744 98.1 °F (36.7 °C) 68 14 98/67 94 %       Pain level:  Patient had no complaints of pain in therapy today, but he reports when he is upright walking and then sits or lies down is when he feels his back hurt most.  Pain location:  Low back  Pain interventions:  Brace, sitting, rest, medications. Spinal (back brace on when out of bed, fall precautions)  GROSS ASSESSMENT Daily Assessment    AROM: Generally decreased, functional  Strength: Generally decreased, functional  Sensation: Impaired       TRANSFERS Daily Assessment   Can transfer without use of the r/walker w/c to bed with CGA. Transfer Type: SPT with walker  Transfer Assistance : 4 (Contact guard assistance)  Sit to Stand Assistance: Contact guard assistance       GAIT Daily Assessment   Patient continues to need verbal cues to keep the r/walker close to his body and stay more upright to prevent his low back from hurting. He places a lot of weight through his arms on his r/walker during gait.   Amount of Assistance: 4 (Minimal assistance)  Distance (ft): 75 Feet (ft) (75' x 2)  Assistive Device: Walker, rolling, ace wrap to assist with DF       BALANCE Daily Assessment    Sitting - Static: Good (unsupported)  Sitting - Dynamic: Good (unsupported)  Standing - Static: Fair  Standing - Dynamic : Impaired         LOWER EXTREMITY EXERCISES Daily Assessment    Extremity: Both  Exercise Type #1: Seated lower extremity strengthening (Motomed x 10 at level 3)  Level of Assist: Supervision  Exercise Type #2: Seated lower extremity strengthening (Isolated RLE strength exercises 1.5# used - hip flex/knee ex)  Sets Performed: 2  Reps Performed: 10  Level of Assist: Supervision          Assessment: Patient appears pleased with his progress and states he is grateful for all the strength and movement he has gotten back already. He adheres to his spinal precautions and participates well in therapy. Patient was taken in his w/c back to his room and was assisted with a transfer onto the bed where he prefers to sit to have his lunch. Plan of Care: Continue to treat and progress as indicated.       Patsy De Jesus  8/18/2017

## 2017-08-18 NOTE — PROGRESS NOTES
PHYSICAL THERAPY DAILY NOTE  Time In: 6578  Time Out: 2271  Patient Seen For: PM;Balance activities;Gait training; Therapeutic exercise;Transfer training    Subjective: \"I am ready to do whatever you feel is needed\"         Objective:  Vital Signs:  BP 98/67  Pulse 68  Temp 98.1 °F (36.7 °C)  Resp 14  SpO2 94%    Pain level:  Some pain in his left hip that he reports has been bad for some time. Not specifically his back    Patient education: Reviewed spinal precautions with turning to sit down. Spinal (back brace on when out of bed, fall precautions)  GROSS ASSESSMENT Daily Assessment    AROM: Generally decreased, functional  Strength: Generally decreased, functional  Sensation: Impaired         TRANSFERS Daily Assessment   Improved SPT and LPT on/off bed and exercise equipment. Needs reminder to not turn all the way around when transferring. Transfer Type: SPT without device  Transfer Assistance : 4 (Contact guard assistance)  Sit to Stand Assistance: Contact guard assistance  Car Transfers: Not tested       GAIT Daily Assessment   Verbal feedback needed to keep r/walker closer and stay upright. Amount of Assistance: 4 (Contact guard assistance)  Distance (ft): 75 Feet (ft) (75 x 2)  Assistive Device: Walker, rolling, foot-up for RLE dorsi flexion         BALANCE Daily Assessment    Sitting - Static: Good (unsupported)  Sitting - Dynamic: Good (unsupported)  Standing - Static: Fair  Standing - Dynamic : Impaired       LOWER EXTREMITY EXERCISES Daily Assessment   Patient benefited from work in the parallel bars on forward, backward and side stepping using the mirrors for upright posture feedback and practicing reducing his BUE weight bearing and relying more on his legs in a more secure environment. His gait and posture both improved.    Extremity: Both  Exercise Type #1: Seated lower extremity strengthening (NuStep x 10 at level 3)  Level of Assist: Supervision  Exercise Type #2: Standing lower extremity strengthening (Parallel bar work on upright posture and backward/side steps)  Sets Performed: 2  Reps Performed: 10  Level of Assist: Supervision          Assessment: Patient appears to be tolerating and making good progress in PT. Very motivated but does not feel confident with his gait. He reports he had several falls prior to his surgery that keeps him now with lower confidence. Patient returned to his room and was assisted back to his bed taking his back brace off and moving to a supine position. Plan of Care:  Continue to progress as tolerated and indicated.       Patsy Knutson  8/18/2017

## 2017-08-18 NOTE — PROGRESS NOTES
Physical Medicine and Rehabilitation Progress Note    Zuleyma Sood  Admit Date: 8/12/2017  Admit Diagnosis: Post Spinal Surgery Rehab;Spinal stenosis of lumbar region  Chief Complaint: gait dysfunction secondary to below  Admission Diagnosis: lumbar spinal stenosis  bilateral L3 - L5 laminectomy, partial facetectomy, foraminotomy and lumbar interbody fusion with spinal instrumentation on 8/8/2017  Recurrent DVTs, Pulmonary Embolism requiring long term anticoagulation  HTN  Arthritis  S/P RLL lobectomy   R hip arthroplasty  Chronic pain  Insomnia  H/o renal failure in 2009  Rehabilitation Diagnosis:  Mobility deficits  Self care deficits  Dysequilibrium  Acute on chronic pain    Subjective:     Patient seen and examined. Vss. Afebrile. Patient has no new neurologic deficits. Continues to make good gains. R LE weakness, poor control primary barrier. Wound healing well.      Objective:     Current Facility-Administered Medications   Medication Dose Route Frequency    warfarin (COUMADIN) tablet 5 mg  5 mg Oral QHS    oxyCODONE IR (ROXICODONE) tablet 5-15 mg  5-15 mg Oral Q4H PRN    traZODone (DESYREL) tablet 50 mg  50 mg Oral QHS PRN    senna-docusate (PERICOLACE) 8.6-50 mg per tablet 2 Tab  2 Tab Oral BID    bisacodyl (DULCOLAX) suppository 10 mg  10 mg Rectal DAILY PRN    bisacodyl (DULCOLAX) tablet 5 mg  5 mg Oral DAILY PRN    cyclobenzaprine (FLEXERIL) tablet 5 mg  5 mg Oral TID PRN    magnesium hydroxide (MILK OF MAGNESIA) 400 mg/5 mL oral suspension 30 mL  30 mL Oral DAILY PRN    alum-mag hydroxide-simeth (MYLANTA) oral suspension 30 mL  30 mL Oral Q4H PRN    testosterone enanthate (DELATESTRYL) injection 200 mg  200 mg IntraMUSCular Q 14 DAYS    famotidine (PEPCID) tablet 20 mg  20 mg Oral BID    ondansetron (ZOFRAN ODT) tablet 4 mg  4 mg Oral Q6H PRN    nebivolol (BYSTOLIC) tablet 5 mg  5 mg Oral DAILY     Allergies   Allergen Reactions    Adhesive Contact Dermatitis       Visit Vitals    BP 98/67    Pulse 68    Temp 98.1 °F (36.7 °C)    Resp 14    SpO2 94%      Intake and Output:  08/16 1901 - 08/18 0700  In: 840 [P.O.:840]  Out: 2350 [Urine:2350]    Physical Exam:   General:  Alert, NAD,  resting comfortably in bed       Lungs:  CTA Bilaterally, decreased R LL breath sounds. Heart:  Regular rate and rhythm, no murmur        Abdomen:  Soft, bowel sounds present. .    Neuromuscular:  Left Upper Extremity MMT:  5/5  Sensation: intact     Right Upper Extremity MMT:  5/5      Sensation: intact     Left Lower Extremity MMT:   Hip Flexion: 4-  Knee extension: 5-/5  Ankle Dorsi flexion: 3-/5  EHL: 3-/5  Ankle Plantar Flexion: 4-/5  Sensation: intact     Right Lower Extremity MMT:  Hip Flexion: 4-/5  Knee extension: 4+/5  Ankle Dorsi flexion 4/5  EHL: 4/5  Ankle Plantar Flexion: 4+/5  Sensation: intact   Skin/extremities:  Intact, bilateral SRINATH stockings in place, calves soft, no LE edema       Incision:   dressing intact - clean, dry, no marginal erythema, no rashes or lesions     Functional Assessment:          Balance  Sitting - Static: Good (unsupported) (08/17/17 1500)  Sitting - Dynamic: Good (unsupported) (08/17/17 1500)  Standing - Static: Fair (with RW) (08/17/17 1500)  Standing - Dynamic : Impaired (08/17/17 1500)       [facility] Fall Risk Assessment:  [facility] Fall Risk  Mobility: Ambulates or transfers with assist devices or assistance/unsteady gait (08/17/17 2236)  Mobility Interventions: Communicate number of staff needed for ambulation/transfer (08/17/17 2236)  Mentation: Alert, oriented x 3 (08/17/17 2236)  Mentation Interventions: Adequate sleep, hydration, pain control (08/17/17 2236)  Medication: Patient receiving anticonvulsants, sedatives(tranquilizers), psychotropics or hypnotics, hypoglycemics, narcotics, sleep aids, antihypertensives, laxatives, or diuretics (08/17/17 2236)  Medication Interventions: Patient to call before getting OOB (08/17/17 2236)  Elimination: Independent with frequency or diarrhea (08/17/17 2236)  Elimination Interventions: Call light in reach (08/17/17 2236)  Prior Fall History: No (08/17/17 2236)  History of Falls Interventions: Door open when patient unattended (08/17/17 2236)  Total Score: 3 (08/17/17 2236)  Standard Fall Precautions: Yes (08/17/17 2236)  High Fall Risk: Yes (08/17/17 2236)     Speech Assessment:         Ambulation:  Gait  Distance (ft): 75 Feet (ft) (75ft x 2) (08/17/17 1500)  Assistive Device: Trey Gens, rolling;Gait belt;Orthotic device (Back brace, [place] Toe UP device Right foot) (08/17/17 1500)     Labs/Studies:  Recent Results (from the past 72 hour(s))   PROTHROMBIN TIME + INR    Collection Time: 08/16/17  6:20 AM   Result Value Ref Range    Prothrombin time 34.1 (H) 9.6 - 12.0 sec    INR 3.1 (H) 0.9 - 1.2     PROTHROMBIN TIME + INR    Collection Time: 08/17/17  6:20 AM   Result Value Ref Range    Prothrombin time 35.1 (H) 9.6 - 12.0 sec    INR 3.2 (H) 0.9 - 1.2     CBC WITH AUTOMATED DIFF    Collection Time: 08/17/17  6:20 AM   Result Value Ref Range    WBC 5.1 4.3 - 11.1 K/uL    RBC 3.59 (L) 4.23 - 5.67 M/uL    HGB 12.2 (L) 13.6 - 17.2 g/dL    HCT 35.8 (L) 41.1 - 50.3 %    MCV 99.7 (H) 79.6 - 97.8 FL    MCH 34.0 (H) 26.1 - 32.9 PG    MCHC 34.1 31.4 - 35.0 g/dL    RDW 14.0 11.9 - 14.6 %    PLATELET 266 (L) 407 - 450 K/uL    MPV 9.2 (L) 10.8 - 14.1 FL    DF AUTOMATED      NEUTROPHILS 65 43 - 78 %    LYMPHOCYTES 25 13 - 44 %    MONOCYTES 9 4.0 - 12.0 %    EOSINOPHILS 1 0.5 - 7.8 %    BASOPHILS 0 0.0 - 2.0 %    IMMATURE GRANULOCYTES 0.2 0.0 - 5.0 %    ABS. NEUTROPHILS 3.2 1.7 - 8.2 K/UL    ABS. LYMPHOCYTES 1.3 0.5 - 4.6 K/UL    ABS. MONOCYTES 0.5 0.1 - 1.3 K/UL    ABS. EOSINOPHILS 0.1 0.0 - 0.8 K/UL    ABS. BASOPHILS 0.0 0.0 - 0.2 K/UL    ABS. IMM.  GRANS. 0.0 0.0 - 0.5 K/UL   VITAMIN B12    Collection Time: 08/17/17  1:00 PM   Result Value Ref Range    Vitamin B12 1019 (H) 193 - 986 pg/mL   FERRITIN    Collection Time: 08/17/17  1:00 PM   Result Value Ref Range    Ferritin 227 8 - 388 NG/ML   FOLATE    Collection Time: 08/17/17  1:00 PM   Result Value Ref Range    Folate 12.0 3.1 - 17.5 ng/mL   IRON    Collection Time: 08/17/17  1:00 PM   Result Value Ref Range    Iron 44 35 - 150 ug/dL   LD    Collection Time: 08/17/17  1:00 PM   Result Value Ref Range     (H) 100 - 190 U/L   PROTHROMBIN TIME + INR    Collection Time: 08/18/17  7:30 AM   Result Value Ref Range    Prothrombin time 24.3 (H) 9.6 - 12.0 sec    INR 2.2 (H) 0.9 - 1.2         Assessment: This is a 64 YO with 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 He continues with gait dysfunction, mobility and self care impairments.      Rehabilitation Plan  Continue PT for a minimum of 1.5 hours a day, at least 5 out of 7 days per week to address bed mobility, transfers, ambulation, strengthening, balance, and endurance. The patient to wear back brace when OOB. Patient to continuee to focus on L ankle dorsiflexion strength. Continue OT for a minimum of 1.5 hours a day, at least 5 out of 7 days per week to address ADL (bathing, LE dressing, toileting) safety; energy conservations and adaptive equipment as needed.     Continue 24-hour skilled rehabilitation nursing for bowel and bladder management, skin care for decubitus ulcer prevention , pain management and ongoing medication administration      The patient may benefit from a psychology consult for depression, adjustment disorder. Hx of DVT/ PE, coagulopathy - Anticoagulation management - on long term coumadin secondary to recurrent DVTs/PE. - Continue coumadin, INR- 2.3 on 8/14, lovenox stopped. - pharmacy assisting with dosing. INR 2.3->2.0 ( 8/15)->3.1(8/16) >3.2->2.2(8/18). - conitnue to monitor closely    Spinal stenosis -  Begun on prednisone PTA for a few weeks.  Prednisone tapered, discontinued. HTN -  Controlled on bystolic. BP in good range. Bowel mgmt - on scheduled senokot , prn dulcolax, MOM    GI prophylaxis - on pepcid bid    Pain mgmt- advance to oxycodone 5-15mg q4prn, flexeril prn, yesterday received total 45mg of oxycodone. Fair control. Continue to monitor, assess pain level. - well tolerated. No new pain source. Insomnia - prn trazodone    Acute blood loss anemia - Hgb - 11.8 > 12.2 on 8/13. Monitor.   - macrocytic anemia. Time spent was 25 minutes with over 1/2 in direct patient care/examination, consultation and coordination of care.     Signed By: Oksana Davis MD     August 18, 2017

## 2017-08-18 NOTE — PROGRESS NOTES
OT Daily Note  Time In 1030   Time Out 1115     Pain: Patient had no complaint of pain. Functional Mobility   Pt walked to gym with RW with SBA. Self-Care   Pt donned shoes with mod I using foot funnel, reacher, and elastic shoelaces. Activity Tolerance   Pt completed the following exercises with 10 lb sachin to promote UB strength, activity tolerance, and shoulder stabilization for integration into ADL:  Exercise Reps Comments   Protraction/Retraction 30    Circles 60 1/2 CW, 1/2 CCW   Vs 30    Pt demonstrated good quality during all exercises, but required a rest break between sets. Pt completed prolonged shoulder stabilization task to promote L shoulder ROM and activity tolerance for integration into functional reach. Pt stood 2x for approximately 2 minutes each time while folding laundry. Pt demonstrated decreased standing tolerance today. Pt engaged in functional reach task while trying to maintain precautions with fair compliance. Education   Proper positioning of LSO     Interdisciplinary Communication: PT Deja Castillo about toe brace    Plan: Continue with POC. Pt was left with PT Deja Castillo.      Kassy Noyola OTR/L  8/18/2017

## 2017-08-19 LAB
INR PPP: 2.3 (ref 0.9–1.2)
PROTHROMBIN TIME: 24.7 SEC (ref 9.6–12)

## 2017-08-19 PROCEDURE — 36415 COLL VENOUS BLD VENIPUNCTURE: CPT | Performed by: PHYSICAL MEDICINE & REHABILITATION

## 2017-08-19 PROCEDURE — 74011250637 HC RX REV CODE- 250/637: Performed by: PHYSICAL MEDICINE & REHABILITATION

## 2017-08-19 PROCEDURE — 97150 GROUP THERAPEUTIC PROCEDURES: CPT

## 2017-08-19 PROCEDURE — 85610 PROTHROMBIN TIME: CPT | Performed by: PHYSICAL MEDICINE & REHABILITATION

## 2017-08-19 PROCEDURE — 65310000000 HC RM PRIVATE REHAB

## 2017-08-19 RX ADMIN — FAMOTIDINE 20 MG: 20 TABLET ORAL at 17:05

## 2017-08-19 RX ADMIN — OXYCODONE HYDROCHLORIDE 15 MG: 5 TABLET ORAL at 08:06

## 2017-08-19 RX ADMIN — OXYCODONE HYDROCHLORIDE 10 MG: 5 TABLET ORAL at 22:29

## 2017-08-19 RX ADMIN — NEBIVOLOL HYDROCHLORIDE 5 MG: 5 TABLET ORAL at 08:04

## 2017-08-19 RX ADMIN — STANDARDIZED SENNA CONCENTRATE AND DOCUSATE SODIUM 2 TABLET: 8.6; 5 TABLET, FILM COATED ORAL at 08:04

## 2017-08-19 RX ADMIN — WARFARIN SODIUM 10 MG: 10 TABLET ORAL at 21:29

## 2017-08-19 RX ADMIN — FAMOTIDINE 20 MG: 20 TABLET ORAL at 08:04

## 2017-08-19 RX ADMIN — OXYCODONE HYDROCHLORIDE 15 MG: 5 TABLET ORAL at 16:46

## 2017-08-19 RX ADMIN — STANDARDIZED SENNA CONCENTRATE AND DOCUSATE SODIUM 2 TABLET: 8.6; 5 TABLET, FILM COATED ORAL at 17:04

## 2017-08-19 RX ADMIN — TRAZODONE HYDROCHLORIDE 50 MG: 50 TABLET ORAL at 22:29

## 2017-08-19 NOTE — PROGRESS NOTES
PHYSICAL THERAPY DAILY NOTE  Time In: 0131  Time Out: 1003  Patient Seen For: AM;Transfer training;Gait training; Therapeutic exercise; Other (see progress notes)    Subjective: Patient had no complaints. Objective:  Spinal (back brace on when out of bed, fall precautions)  GROSS ASSESSMENT Daily Assessment            BED/MAT MOBILITY Daily Assessment    Supine to Sit : 0 (Not tested)       TRANSFERS Daily Assessment    Transfer Type: SPT with walker  Transfer Assistance : 4 (Minimal assistance)  Sit to Stand Assistance: Contact guard assistance       GAIT Daily Assessment    Amount of Assistance: 4 (Minimal assistance)  Distance (ft): 70 Feet (ft)  Assistive Device: Gait belt;Walker, rolling;Brace/Splint (dept toe up brace to assist with weak DF)       STEPS or STAIRS Daily Assessment    Level of Assist : 0 (Not tested)       BALANCE Daily Assessment            WHEELCHAIR MOBILITY Daily Assessment            LOWER EXTREMITY EXERCISES Daily Assessment    Extremity: Both  Exercise Type #1: Seated lower extremity strengthening  Sets Performed: 1  Reps Performed: 20  Level of Assist: Supervision  Exercise Type #2: Other (comment) (motomed x 10 minutes)  Sets Performed: 1  Reps Performed: 10  Level of Assist: Supervision          Assessment: Patient making good progress . Posture much better with gait          Plan of Care: Continue with plan of care to reach PT goals. Returned to room with call boles at reach.     Ekta Pack, PTA  8/19/2017

## 2017-08-19 NOTE — PROGRESS NOTES
Warfarin dosing per pharmacist    Maritza Cabello is a 63 y.o. male. Indication:  Hx recurrent DVTs and PE    Goal INR:  2.0 - 3.0    Home dose:  10 mg qhs    Risk factors or significant drug interactions:  none    Other anticoagulants:      Daily Monitoring  Date  INR     Warfarin dose  HGB              Notes  8/9  1.0  10 mg   14.3  8/10  1.0  10 mg   12.7  8/11  1.4  10 mg   11.8  8/12  1.4  10 mg   ---  8/13  1.9  10 mg   12.2  8/14  2.3  10 mg    ---  8/15  2.0  2 mg + 10 mg  ---  8/16  3.1  5 mg   ---  8/17  3.2  5 mg   12.2  8/18  2.2  5 mg + 5 mg  ---  8/19  2.3  10 mg   ---    Pharmacy consulted to assist with warfarin dosing on 8/13. His warfarin was being held for surgery on 8/8 and was resumed on 8/9. INR 2.3 today. Will change back to the 10 mg the patient was receiving. Continue to follow INR daily for now. Pharmacy will continue to follow. Please call with any questions.       Thank you,  Poli Larsen, PharmD  Clinical Pharmacist  [phone]

## 2017-08-19 NOTE — PROGRESS NOTES
Physical Medicine and Rehabilitation Progress Note    [facility]  Admit Date: 8/12/2017  Admit Diagnosis: Post Spinal Surgery Rehab;Spinal stenosis of lumbar region  Chief Complaint: gait dysfunction secondary to below  Admission Diagnosis: lumbar spinal stenosis  bilateral L3 - L5 laminectomy, partial facetectomy, foraminotomy and lumbar interbody fusion with spinal instrumentation on 8/8/2017  Recurrent DVTs, Pulmonary Embolism requiring long term anticoagulation  HTN  Arthritis  S/P RLL lobectomy   R hip arthroplasty  Chronic pain  Insomnia  H/o renal failure in 2009  Rehabilitation Diagnosis:  Mobility deficits  Self care deficits  Dysequilibrium  Acute on chronic pain    Subjective:     Patient seen and examined. Vss. Afebrile. No new complaints. Pain adequately controlled. No new LE weakness, Making steady motor gain R LE.     Objective:     Current Facility-Administered Medications   Medication Dose Route Frequency    warfarin (COUMADIN) tablet 10 mg  10 mg Oral QHS    oxyCODONE IR (ROXICODONE) tablet 5-15 mg  5-15 mg Oral Q4H PRN    traZODone (DESYREL) tablet 50 mg  50 mg Oral QHS PRN    senna-docusate (PERICOLACE) 8.6-50 mg per tablet 2 Tab  2 Tab Oral BID    bisacodyl (DULCOLAX) suppository 10 mg  10 mg Rectal DAILY PRN    bisacodyl (DULCOLAX) tablet 5 mg  5 mg Oral DAILY PRN    cyclobenzaprine (FLEXERIL) tablet 5 mg  5 mg Oral TID PRN    magnesium hydroxide (MILK OF MAGNESIA) 400 mg/5 mL oral suspension 30 mL  30 mL Oral DAILY PRN    alum-mag hydroxide-simeth (MYLANTA) oral suspension 30 mL  30 mL Oral Q4H PRN    testosterone enanthate (DELATESTRYL) injection 200 mg  200 mg IntraMUSCular Q 14 DAYS    famotidine (PEPCID) tablet 20 mg  20 mg Oral BID    ondansetron (ZOFRAN ODT) tablet 4 mg  4 mg Oral Q6H PRN    nebivolol (BYSTOLIC) tablet 5 mg  5 mg Oral DAILY     Allergies   Allergen Reactions    Adhesive Contact Dermatitis       Visit Vitals    /78    Pulse 67    Temp 98.1 °F (36.7 °C)    Resp 18    SpO2 96%      Intake and Output:  08/17 1901 - 08/19 0700  In: 600 [P.O.:600]  Out: 3950 [Urine:3950]    Physical Exam:   General:  Alert, NAD,  resting comfortably in bed       Lungs:  CTA Bilaterally, decreased R LL breath sounds. Heart:  Regular rate and rhythm, no murmur        Abdomen:  Soft, bowel sounds present. .    Neuromuscular:  Left Upper Extremity MMT:  5/5  Sensation: intact     Right Upper Extremity MMT:  5/5      Sensation: intact     Left Lower Extremity MMT:   Hip Flexion: 4-  Knee extension: 5-/5  Ankle Dorsi flexion: 3-/5  EHL: 3-/5  Ankle Plantar Flexion: 4-/5  Sensation: intact     Right Lower Extremity MMT:  Hip Flexion: 4-/5  Knee extension: 4+/5  Ankle Dorsi flexion 4/5  EHL: 4/5  Ankle Plantar Flexion: 4+/5  Sensation: intact   Skin/extremities:  Intact, bilateral SRINATH stockings in place, calves soft, no LE edema       Incision:   dressing intact - clean, dry, no marginal erythema, no rashes or lesions     Functional Assessment:  Gross Assessment  AROM: Generally decreased, functional (08/18/17 1500)  Strength: Generally decreased, functional (08/18/17 1500)  Sensation: Impaired (08/18/17 1500)       Balance  Sitting - Static: Good (unsupported) (08/18/17 1500)  Sitting - Dynamic: Good (unsupported) (08/18/17 1500)  Standing - Static: Fair (08/18/17 1500)  Standing - Dynamic : Impaired (08/18/17 1500)       Melinda Lora Fall Risk Assessment:  Verzonia DixonPickaway Fall Risk  Mobility: Ambulates or transfers with assist devices or assistance/unsteady gait (08/19/17 0721)  Mobility Interventions: Patient to call before getting OOB (08/19/17 0721)  Mentation: Alert, oriented x 3 (08/19/17 0721)  Mentation Interventions: Increase mobility (08/19/17 0721)  Medication: Patient receiving anticonvulsants, sedatives(tranquilizers), psychotropics or hypnotics, hypoglycemics, narcotics, sleep aids, antihypertensives, laxatives, or diuretics (08/19/17 0721)  Medication Interventions: Patient to call before getting OOB (08/19/17 0042)  Elimination: Needs assistance with toileting (08/19/17 0721)  Elimination Interventions: Call light in reach; Patient to call for help with toileting needs; Toileting schedule/hourly rounds (08/19/17 0721)  Prior Fall History: No (08/19/17 8639)  History of Falls Interventions: Door open when patient unattended (08/18/17 1936)  Total Score: 3 (08/19/17 0721)  Standard Fall Precautions: Yes (08/19/17 0721)  High Fall Risk: Yes (08/17/17 2236)     Speech Assessment:         Ambulation:  Gait  Distance (ft): 75 Feet (ft) (75 x 2) (08/18/17 1500)  Assistive Device: Walker, rolling (08/18/17 1500)     Labs/Studies:  Recent Results (from the past 72 hour(s))   PROTHROMBIN TIME + INR    Collection Time: 08/17/17  6:20 AM   Result Value Ref Range    Prothrombin time 35.1 (H) 9.6 - 12.0 sec    INR 3.2 (H) 0.9 - 1.2     CBC WITH AUTOMATED DIFF    Collection Time: 08/17/17  6:20 AM   Result Value Ref Range    WBC 5.1 4.3 - 11.1 K/uL    RBC 3.59 (L) 4.23 - 5.67 M/uL    HGB 12.2 (L) 13.6 - 17.2 g/dL    HCT 35.8 (L) 41.1 - 50.3 %    MCV 99.7 (H) 79.6 - 97.8 FL    MCH 34.0 (H) 26.1 - 32.9 PG    MCHC 34.1 31.4 - 35.0 g/dL    RDW 14.0 11.9 - 14.6 %    PLATELET 827 (L) 633 - 450 K/uL    MPV 9.2 (L) 10.8 - 14.1 FL    DF AUTOMATED      NEUTROPHILS 65 43 - 78 %    LYMPHOCYTES 25 13 - 44 %    MONOCYTES 9 4.0 - 12.0 %    EOSINOPHILS 1 0.5 - 7.8 %    BASOPHILS 0 0.0 - 2.0 %    IMMATURE GRANULOCYTES 0.2 0.0 - 5.0 %    ABS. NEUTROPHILS 3.2 1.7 - 8.2 K/UL    ABS. LYMPHOCYTES 1.3 0.5 - 4.6 K/UL    ABS. MONOCYTES 0.5 0.1 - 1.3 K/UL    ABS. EOSINOPHILS 0.1 0.0 - 0.8 K/UL    ABS. BASOPHILS 0.0 0.0 - 0.2 K/UL    ABS. IMM.  GRANS. 0.0 0.0 - 0.5 K/UL   VITAMIN B12    Collection Time: 08/17/17  1:00 PM   Result Value Ref Range    Vitamin B12 1019 (H) 193 - 986 pg/mL   FERRITIN    Collection Time: 08/17/17  1:00 PM   Result Value Ref Range    Ferritin 227 8 - 388 NG/ML   FOLATE    Collection Time: 08/17/17  1:00 PM   Result Value Ref Range    Folate 12.0 3.1 - 17.5 ng/mL   IRON    Collection Time: 08/17/17  1:00 PM   Result Value Ref Range    Iron 44 35 - 150 ug/dL   LD    Collection Time: 08/17/17  1:00 PM   Result Value Ref Range     (H) 100 - 190 U/L   PROTHROMBIN TIME + INR    Collection Time: 08/18/17  7:30 AM   Result Value Ref Range    Prothrombin time 24.3 (H) 9.6 - 12.0 sec    INR 2.2 (H) 0.9 - 1.2     PROTHROMBIN TIME + INR    Collection Time: 08/19/17  7:33 AM   Result Value Ref Range    Prothrombin time 24.7 (H) 9.6 - 12.0 sec    INR 2.3 (H) 0.9 - 1.2         Assessment: This is a 64 YO with 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 He continues with gait dysfunction, mobility and self care impairments.      Rehabilitation Plan  Continue PT for a minimum of 1.5 hours a day, at least 5 out of 7 days per week to address bed mobility, transfers, ambulation, strengthening, balance, and endurance. The patient to wear back brace when OOB. Patient to continuee to focus on L ankle dorsiflexion strength. Continue OT for a minimum of 1.5 hours a day, at least 5 out of 7 days per week to address ADL (bathing, LE dressing, toileting) safety; energy conservations and adaptive equipment as needed.     Continue 24-hour skilled rehabilitation nursing for bowel and bladder management, skin care for decubitus ulcer prevention , pain management and ongoing medication administration      The patient may benefit from a psychology consult for depression, adjustment disorder. Hx of DVT/ PE, coagulopathy - Anticoagulation management - on long term coumadin secondary to recurrent DVTs/PE. - Continue coumadin, INR- 2.3 on 8/14, lovenox stopped. - pharmacy assisting with dosing. INR 2.3->2.0 ( 8/15)->3.1(8/16) >3.2->2.2(8/18).    - conitnue to monitor closely    Spinal stenosis -  Begun on prednisone PTA for a few weeks. Prednisone tapered, discontinued. HTN -  Controlled on bystolic. BP in good range. Bowel mgmt - on scheduled senokot , prn dulcolax, MOM    GI prophylaxis - on pepcid bid    Pain mgmt- advance to oxycodone 5-15mg q4prn, flexeril prn, yesterday received total 45mg of oxycodone. Fair control. Continue to monitor, assess pain level. - well tolerated. No new pain source. Insomnia - prn trazodone    Acute blood loss anemia - Hgb - 11.8 > 12.2 on 8/13. Monitor.   - macrocytic anemia. Time spent was 25 minutes with over 1/2 in direct patient care/examination, consultation and coordination of care.     Signed By: Efren Chaney MD     August 19, 2017

## 2017-08-20 LAB
INR PPP: 1.7 (ref 0.9–1.2)
PROTHROMBIN TIME: 19 SEC (ref 9.6–12)

## 2017-08-20 PROCEDURE — 97150 GROUP THERAPEUTIC PROCEDURES: CPT

## 2017-08-20 PROCEDURE — 85610 PROTHROMBIN TIME: CPT | Performed by: PHYSICAL MEDICINE & REHABILITATION

## 2017-08-20 PROCEDURE — 74011250637 HC RX REV CODE- 250/637: Performed by: PHYSICAL MEDICINE & REHABILITATION

## 2017-08-20 PROCEDURE — 65310000000 HC RM PRIVATE REHAB

## 2017-08-20 PROCEDURE — 74011250636 HC RX REV CODE- 250/636: Performed by: PHYSICAL MEDICINE & REHABILITATION

## 2017-08-20 PROCEDURE — 36415 COLL VENOUS BLD VENIPUNCTURE: CPT | Performed by: PHYSICAL MEDICINE & REHABILITATION

## 2017-08-20 RX ORDER — ENOXAPARIN SODIUM 100 MG/ML
40 INJECTION SUBCUTANEOUS EVERY 24 HOURS
Status: DISCONTINUED | OUTPATIENT
Start: 2017-08-20 | End: 2017-08-22

## 2017-08-20 RX ADMIN — STANDARDIZED SENNA CONCENTRATE AND DOCUSATE SODIUM 2 TABLET: 8.6; 5 TABLET, FILM COATED ORAL at 08:06

## 2017-08-20 RX ADMIN — STANDARDIZED SENNA CONCENTRATE AND DOCUSATE SODIUM 2 TABLET: 8.6; 5 TABLET, FILM COATED ORAL at 17:02

## 2017-08-20 RX ADMIN — WARFARIN SODIUM 10 MG: 10 TABLET ORAL at 21:05

## 2017-08-20 RX ADMIN — OXYCODONE HYDROCHLORIDE 15 MG: 5 TABLET ORAL at 13:44

## 2017-08-20 RX ADMIN — NEBIVOLOL HYDROCHLORIDE 5 MG: 5 TABLET ORAL at 08:06

## 2017-08-20 RX ADMIN — OXYCODONE HYDROCHLORIDE 15 MG: 5 TABLET ORAL at 21:05

## 2017-08-20 RX ADMIN — ENOXAPARIN SODIUM 40 MG: 40 INJECTION SUBCUTANEOUS at 21:04

## 2017-08-20 RX ADMIN — FAMOTIDINE 20 MG: 20 TABLET ORAL at 08:06

## 2017-08-20 RX ADMIN — FAMOTIDINE 20 MG: 20 TABLET ORAL at 17:03

## 2017-08-20 RX ADMIN — OXYCODONE HYDROCHLORIDE 15 MG: 5 TABLET ORAL at 08:07

## 2017-08-20 NOTE — PROGRESS NOTES
PHYSICAL THERAPY DAILY NOTE  Time In: 0911  Time Out: 1009  Patient Seen For: AM;Transfer training;Gait training; Therapeutic exercise; Other (see progress notes)    Subjective: Patient had no complaints. Objective: Min assist with donning back brace. Spinal (back brace on when out of bed, fall precautions)  GROSS ASSESSMENT Daily Assessment            BED/MAT MOBILITY Daily Assessment    Supine to Sit : 3 (Moderate assistance)  Sit to Supine : 3 (Moderate assistance)       TRANSFERS Daily Assessment    Transfer Type: SPT with walker  Transfer Assistance : 4 (Minimal assistance)  Sit to Stand Assistance: Minimal assistance       GAIT Daily Assessment    Amount of Assistance: 4 (Minimal assistance)  Distance (ft): 70 Feet (ft)  Assistive Device: Gait belt;Walker, rolling;Brace/Splint (toe up brace to assist weak DF)       STEPS or STAIRS Daily Assessment    Level of Assist : 0 (Not tested)       BALANCE Daily Assessment            WHEELCHAIR MOBILITY Daily Assessment            LOWER EXTREMITY EXERCISES Daily Assessment    Extremity: Both  Exercise Type #1: Supine lower extremity strengthening  Sets Performed: 1  Reps Performed: 20  Level of Assist: Minimal assistance          Assessment: Patient improving with gait pattern and posture. Plan of Care: Continue with plan of care to reach PT goals. Returned to room with call bell at [facility] .     Kevin Chau, PTA  8/20/2017

## 2017-08-20 NOTE — PROGRESS NOTES
Physical Medicine and Rehabilitation Progress Note    [facility]  Admit Date: 8/12/2017  Admit Diagnosis: Post Spinal Surgery Rehab;Spinal stenosis of lumbar region  Chief Complaint: gait dysfunction secondary to below  Admission Diagnosis: lumbar spinal stenosis  bilateral L3 - L5 laminectomy, partial facetectomy, foraminotomy and lumbar interbody fusion with spinal instrumentation on 8/8/2017  Recurrent DVTs, Pulmonary Embolism requiring long term anticoagulation  HTN  Arthritis  S/P RLL lobectomy   R hip arthroplasty  Chronic pain  Insomnia  H/o renal failure in 2009  Rehabilitation Diagnosis:  Mobility deficits  Self care deficits  Dysequilibrium  Acute on chronic pain    Subjective:     Patient seen and examined. Vss. Afebrile. No acute problems, no new pain source, no new weakness. Patient happy with his progress. He hatoelrated PT session on Sunday, today. Walking with min assist using toe up brace to assist weak DF, and RW.      Objective:     Current Facility-Administered Medications   Medication Dose Route Frequency    warfarin (COUMADIN) tablet 10 mg  10 mg Oral QHS    oxyCODONE IR (ROXICODONE) tablet 5-15 mg  5-15 mg Oral Q4H PRN    traZODone (DESYREL) tablet 50 mg  50 mg Oral QHS PRN    senna-docusate (PERICOLACE) 8.6-50 mg per tablet 2 Tab  2 Tab Oral BID    bisacodyl (DULCOLAX) suppository 10 mg  10 mg Rectal DAILY PRN    bisacodyl (DULCOLAX) tablet 5 mg  5 mg Oral DAILY PRN    cyclobenzaprine (FLEXERIL) tablet 5 mg  5 mg Oral TID PRN    magnesium hydroxide (MILK OF MAGNESIA) 400 mg/5 mL oral suspension 30 mL  30 mL Oral DAILY PRN    alum-mag hydroxide-simeth (MYLANTA) oral suspension 30 mL  30 mL Oral Q4H PRN    testosterone enanthate (DELATESTRYL) injection 200 mg  200 mg IntraMUSCular Q 14 DAYS    famotidine (PEPCID) tablet 20 mg  20 mg Oral BID    ondansetron (ZOFRAN ODT) tablet 4 mg  4 mg Oral Q6H PRN    nebivolol (BYSTOLIC) tablet 5 mg  5 mg Oral DAILY     Allergies   Allergen Reactions    Adhesive Contact Dermatitis       Visit Vitals    /69 (BP 1 Location: Right arm, BP Patient Position: At rest)    Pulse 72    Temp 98.2 °F (36.8 °C)    Resp 16    SpO2 100%      Intake and Output:  08/18 1901 - 08/20 0700  In: 240 [P.O.:240]  Out: 5075 [Urine:5075]    Physical Exam:   General:  Alert, NAD,  resting comfortably in bed       Lungs:  CTA Bilaterally, decreased R LL breath sounds. Heart:  Regular rate and rhythm, no murmur        Abdomen:  Soft, bowel sounds present. .    Neuromuscular:  Left Upper Extremity MMT:  5/5  Sensation: intact     Right Upper Extremity MMT:  5/5      Sensation: intact     Left Lower Extremity MMT:   Hip Flexion: 4-  Knee extension: 5-/5  Ankle Dorsi flexion: 3-/5  EHL: 3-/5  Ankle Plantar Flexion: 4-/5  Sensation: intact     Right Lower Extremity MMT:  Hip Flexion: 4-/5  Knee extension: 4+/5  Ankle Dorsi flexion 4/5  EHL: 4/5  Ankle Plantar Flexion: 4+/5  Sensation: intact   Skin/extremities:  Intact, bilateral SRINATH stockings in place, calves soft, no LE edema       Incision:   dressing intact - clean, dry, no marginal erythema, no rashes or lesions     Functional Assessment:  Gross Assessment  AROM: Generally decreased, functional (08/18/17 1500)  Strength: Generally decreased, functional (08/18/17 1500)  Sensation: Impaired (08/18/17 1500)       Balance  Sitting - Static: Good (unsupported) (08/18/17 1500)  Sitting - Dynamic: Good (unsupported) (08/18/17 1500)  Standing - Static: Fair (08/18/17 1500)  Standing - Dynamic : Impaired (08/18/17 1500)       Liya Mill Fall Risk Assessment:  Liya Mary Fall Risk  Mobility: Ambulates or transfers with assist devices or assistance/unsteady gait (08/20/17 0710)  Mobility Interventions: Patient to call before getting OOB (08/20/17 0710)  Mentation: Alert, oriented x 3 (08/20/17 0710)  Mentation Interventions: Increase mobility (08/20/17 0710)  Medication: Patient receiving anticonvulsants, sedatives(tranquilizers), psychotropics or hypnotics, hypoglycemics, narcotics, sleep aids, antihypertensives, laxatives, or diuretics (08/20/17 0710)  Medication Interventions: Patient to call before getting OOB (08/20/17 0710)  Elimination: Needs assistance with toileting (08/20/17 0710)  Elimination Interventions: Call light in reach; Patient to call for help with toileting needs; Toileting schedule/hourly rounds (08/20/17 0710)  Prior Fall History: No (08/20/17 0710)  History of Falls Interventions: Door open when patient unattended (08/20/17 0710)  Total Score: 3 (08/20/17 0710)  Standard Fall Precautions: Yes (08/20/17 0710)  High Fall Risk: Yes (08/17/17 2236)     Speech Assessment:         Ambulation:  Gait  Distance (ft): 70 Feet (ft) (08/19/17 1236)  Assistive Device: Gait belt;Walker, rolling;Brace/Splint (dept toe up brace to assist with weak DF) (08/19/17 1236)     Labs/Studies:  Recent Results (from the past 72 hour(s))   VITAMIN B12    Collection Time: 08/17/17  1:00 PM   Result Value Ref Range    Vitamin B12 1019 (H) 193 - 986 pg/mL   FERRITIN    Collection Time: 08/17/17  1:00 PM   Result Value Ref Range    Ferritin 227 8 - 388 NG/ML   FOLATE    Collection Time: 08/17/17  1:00 PM   Result Value Ref Range    Folate 12.0 3.1 - 17.5 ng/mL   IRON    Collection Time: 08/17/17  1:00 PM   Result Value Ref Range    Iron 44 35 - 150 ug/dL   LD    Collection Time: 08/17/17  1:00 PM   Result Value Ref Range     (H) 100 - 190 U/L   PROTHROMBIN TIME + INR    Collection Time: 08/18/17  7:30 AM   Result Value Ref Range    Prothrombin time 24.3 (H) 9.6 - 12.0 sec    INR 2.2 (H) 0.9 - 1.2     PROTHROMBIN TIME + INR    Collection Time: 08/19/17  7:33 AM   Result Value Ref Range    Prothrombin time 24.7 (H) 9.6 - 12.0 sec    INR 2.3 (H) 0.9 - 1.2     PROTHROMBIN TIME + INR    Collection Time: 08/20/17  5:34 AM   Result Value Ref Range    Prothrombin time 19.0 (H) 9.6 - 12.0 sec    INR 1.7 (H) 0.9 - 1.2         Assessment:   This is a 64 YO with 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 He continues with gait dysfunction, mobility and self care impairments.      Rehabilitation Plan  Continue PT for a minimum of 1.5 hours a day, at least 5 out of 7 days per week to address bed mobility, transfers, ambulation, strengthening, balance, and endurance. The patient to wear back brace when OOB. Patient to continuee to focus on L ankle dorsiflexion strength. Continue OT for a minimum of 1.5 hours a day, at least 5 out of 7 days per week to address ADL (bathing, LE dressing, toileting) safety; energy conservations and adaptive equipment as needed.     Continue 24-hour skilled rehabilitation nursing for bowel and bladder management, skin care for decubitus ulcer prevention , pain management and ongoing medication administration      The patient may benefit from a psychology consult for depression, adjustment disorder. Hx of DVT/ PE, coagulopathy - Anticoagulation management - on long term coumadin secondary to recurrent DVTs/PE. - Continue coumadin, INR- 2.3 on 8/14, lovenox had been stopped. - pharmacy assisting with dosing. INR 2.3->2.0 ( 8/15)->3.1(8/16) >3.2->2.2(8/18)->2.3->1.7( 8/20). Will bridge with Lovenox as high risk for DVT. - conitnue to monitor closely    Spinal stenosis -  Begun on prednisone PTA for a few weeks. Prednisone tapered, discontinued. HTN -  Controlled on bystolic. BP in good range. Bowel mgmt - on scheduled senokot , prn dulcolax, MOM    GI prophylaxis - on pepcid bid    Pain mgmt- advance to oxycodone 5-15mg q4prn, flexeril prn, yesterday received total 45mg of oxycodone. Fair control. Continue to monitor, assess pain level. -  No new pain source. No increase in pain. Takes about 3 tabs roxicodone prn.      Insomnia - prn trazodone    Acute blood loss anemia - Hgb - 11.8 > 12.2 on 8/13. Monitor.   - macrocytic anemia. Time spent was 25 minutes with over 1/2 in direct patient care/examination, consultation and coordination of care.     Signed By: Roslyn Williamson MD     August 20, 2017

## 2017-08-20 NOTE — PROGRESS NOTES
Problem: Falls - Risk of  Goal: *Absence of Falls  Document Erick Fall Risk and appropriate interventions in the flowsheet.    Outcome: Progressing Towards Goal  Fall Risk Interventions:  Mobility Interventions: Patient to call before getting OOB     Mentation Interventions: Door open when patient unattended, Toileting rounds     Medication Interventions: Patient to call before getting OOB     Elimination Interventions: Call light in reach, Toileting schedule/hourly rounds     History of Falls Interventions: Door open when patient unattended

## 2017-08-20 NOTE — PROGRESS NOTES
Warfarin dosing per pharmacist    Hazel Brittle is a 63 y.o. male. Indication:  Hx recurrent DVTs and PE    Goal INR:  2.0 - 3.0    Home dose:  10 mg qhs    Risk factors or significant drug interactions:  none    Other anticoagulants:      Daily Monitoring  Date  INR     Warfarin dose  HGB              Notes  8/9  1.0  10 mg   14.3  8/10  1.0  10 mg   12.7  8/11  1.4  10 mg   11.8  8/12  1.4  10 mg   ---  8/13  1.9  10 mg   12.2  8/14  2.3  10 mg    ---  8/15  2.0  2 mg + 10 mg  ---  8/16  3.1  5 mg   ---  8/17  3.2  5 mg   12.2  8/18  2.2  5 mg + 5 mg  ---  8/19  2.3  10 mg   ---  8/20  1.7  10 mg   ---    Pharmacy consulted to assist with warfarin dosing on 8/13. His warfarin was being held for surgery on 8/8 and was resumed on 8/9. INR 1.7 today. Will continue 10 mg tonight. Pt received a few days of 5 mg so could still be seeing a drop from that. If decrease tomorrow - may want to give one time bolus dose. INR now sub therapeutic - may want to consider adding a bridge. Continue to follow INR daily for now. Pharmacy will continue to follow. Please call with any questions.       Thank you,  Ruddy Tim, PharmD  Clinical Pharmacist  [phone]

## 2017-08-21 PROBLEM — Z86.718 PERSONAL HISTORY OF DVT (DEEP VEIN THROMBOSIS): Status: ACTIVE | Noted: 2017-08-21

## 2017-08-21 LAB
INR PPP: 2.1 (ref 0.9–1.2)
PROTHROMBIN TIME: 23.3 SEC (ref 9.6–12)

## 2017-08-21 PROCEDURE — 97110 THERAPEUTIC EXERCISES: CPT

## 2017-08-21 PROCEDURE — 99232 SBSQ HOSP IP/OBS MODERATE 35: CPT | Performed by: PHYSICAL MEDICINE & REHABILITATION

## 2017-08-21 PROCEDURE — 74011250636 HC RX REV CODE- 250/636: Performed by: PHYSICAL MEDICINE & REHABILITATION

## 2017-08-21 PROCEDURE — 97530 THERAPEUTIC ACTIVITIES: CPT

## 2017-08-21 PROCEDURE — 36415 COLL VENOUS BLD VENIPUNCTURE: CPT | Performed by: PHYSICAL MEDICINE & REHABILITATION

## 2017-08-21 PROCEDURE — 97535 SELF CARE MNGMENT TRAINING: CPT

## 2017-08-21 PROCEDURE — 65310000000 HC RM PRIVATE REHAB

## 2017-08-21 PROCEDURE — 74011250637 HC RX REV CODE- 250/637: Performed by: PHYSICAL MEDICINE & REHABILITATION

## 2017-08-21 PROCEDURE — 97116 GAIT TRAINING THERAPY: CPT

## 2017-08-21 PROCEDURE — 85610 PROTHROMBIN TIME: CPT | Performed by: PHYSICAL MEDICINE & REHABILITATION

## 2017-08-21 RX ADMIN — ENOXAPARIN SODIUM 40 MG: 40 INJECTION SUBCUTANEOUS at 17:28

## 2017-08-21 RX ADMIN — TRAZODONE HYDROCHLORIDE 50 MG: 50 TABLET ORAL at 22:19

## 2017-08-21 RX ADMIN — NEBIVOLOL HYDROCHLORIDE 5 MG: 5 TABLET ORAL at 08:09

## 2017-08-21 RX ADMIN — OXYCODONE HYDROCHLORIDE 15 MG: 5 TABLET ORAL at 08:13

## 2017-08-21 RX ADMIN — OXYCODONE HYDROCHLORIDE 15 MG: 5 TABLET ORAL at 18:05

## 2017-08-21 RX ADMIN — WARFARIN SODIUM 10 MG: 10 TABLET ORAL at 22:19

## 2017-08-21 RX ADMIN — FAMOTIDINE 20 MG: 20 TABLET ORAL at 08:09

## 2017-08-21 RX ADMIN — STANDARDIZED SENNA CONCENTRATE AND DOCUSATE SODIUM 2 TABLET: 8.6; 5 TABLET, FILM COATED ORAL at 08:10

## 2017-08-21 RX ADMIN — FAMOTIDINE 20 MG: 20 TABLET ORAL at 17:28

## 2017-08-21 RX ADMIN — STANDARDIZED SENNA CONCENTRATE AND DOCUSATE SODIUM 2 TABLET: 8.6; 5 TABLET, FILM COATED ORAL at 17:28

## 2017-08-21 RX ADMIN — OXYCODONE HYDROCHLORIDE 15 MG: 5 TABLET ORAL at 22:19

## 2017-08-21 NOTE — PROGRESS NOTES
Problem: Falls - Risk of  Goal: *Absence of Falls  Document Erick Fall Risk and appropriate interventions in the flowsheet.    Outcome: Progressing Towards Goal  Fall Risk Interventions:  Mobility Interventions: Patient to call before getting OOB     Mentation Interventions: Door open when patient unattended     Medication Interventions: Patient to call before getting OOB     Elimination Interventions: Call light in reach     History of Falls Interventions: Door open when patient unattended

## 2017-08-21 NOTE — PROGRESS NOTES
Martin Mcgill MD,   Medical Director  [address] [facility], [address] [facility]  Tel: [phone]       [facility] PROGRESS NOTE    Caleb Langford  Admit Date: 8/12/2017  Admit Diagnosis: Post Spinal Surgery Rehab;Spinal stenosis of lumbar region    Subjective     Doing well. Pain is controlled. No cp/sob. Feeling stronger. Continent of b/b. Progressing with therapies. D/w pt occurrences/progress in my absence last week    Objective:     Current Facility-Administered Medications   Medication Dose Route Frequency    enoxaparin (LOVENOX) injection 40 mg  40 mg SubCUTAneous Q24H    warfarin (COUMADIN) tablet 10 mg  10 mg Oral QHS    oxyCODONE IR (ROXICODONE) tablet 5-15 mg  5-15 mg Oral Q4H PRN    traZODone (DESYREL) tablet 50 mg  50 mg Oral QHS PRN    senna-docusate (PERICOLACE) 8.6-50 mg per tablet 2 Tab  2 Tab Oral BID    bisacodyl (DULCOLAX) suppository 10 mg  10 mg Rectal DAILY PRN    bisacodyl (DULCOLAX) tablet 5 mg  5 mg Oral DAILY PRN    cyclobenzaprine (FLEXERIL) tablet 5 mg  5 mg Oral TID PRN    magnesium hydroxide (MILK OF MAGNESIA) 400 mg/5 mL oral suspension 30 mL  30 mL Oral DAILY PRN    alum-mag hydroxide-simeth (MYLANTA) oral suspension 30 mL  30 mL Oral Q4H PRN    testosterone enanthate (DELATESTRYL) injection 200 mg  200 mg IntraMUSCular Q 14 DAYS    famotidine (PEPCID) tablet 20 mg  20 mg Oral BID    ondansetron (ZOFRAN ODT) tablet 4 mg  4 mg Oral Q6H PRN    nebivolol (BYSTOLIC) tablet 5 mg  5 mg Oral DAILY     Review of Systems:Denies chest pain, shortness of breath, cough, headache, visual problems, abdominal pain, dysurea, calf pain. Pertinent positives are as noted in the medical records and unremarkable otherwise.      Visit Vitals    /68 (BP 1 Location: Right arm, BP Patient Position: At rest)    Pulse 73    Temp 98.7 °F (37.1 °C)    Resp 17    SpO2 96%        Physical Exam:   General: Alert and age appropriately oriented. No acute cardio respiratory distress. HEENT: Normocephalic,no scleral icterus  Oral mucosa moist without cyanosis   Lungs: Clear to auscultation  bilaterally. Respiration even and unlabored   Heart: Regular rate and rhythm, S1, S2   No  murmurs, clicks, rub or gallops   Abdomen: Soft, non-tender, nondistended. Bowel sounds present. No organomegaly. Genitourinary: Benign . Neuromuscular:      Hip flexion 4, KE 4+, DF on the right 3-, PF 4-  LLE DF 5-, PF 5-, EHL 4-  Sensation intact   Skin/extremity: No rashes, no erythema. No calf tenderness BLE  Wound (lumbar) with steri strips.  Dressing dry, no erythema or swelling                                                                            Functional Assessment:  Gross Assessment  AROM: Generally decreased, functional (08/18/17 1500)  Strength: Generally decreased, functional (08/18/17 1500)  Sensation: Impaired (08/18/17 1500)       Balance  Sitting - Static: Good (unsupported) (08/18/17 1500)  Sitting - Dynamic: Good (unsupported) (08/18/17 1500)  Standing - Static: Fair (08/18/17 1500)  Standing - Dynamic : Impaired (08/18/17 1500)           Toileting  Cues: Physical assistance for pants down (08/16/17 1021)         Sol Aviles Fall Risk Assessment:  Sol Aviles Fall Risk  Mobility: Ambulates or transfers with assist devices or assistance/unsteady gait (08/21/17 0749)  Mobility Interventions: Patient to call before getting OOB (08/21/17 0749)  Mentation: Alert, oriented x 3 (08/21/17 0749)  Mentation Interventions: Door open when patient unattended (08/21/17 0749)  Medication: Patient receiving anticonvulsants, sedatives(tranquilizers), psychotropics or hypnotics, hypoglycemics, narcotics, sleep aids, antihypertensives, laxatives, or diuretics (08/21/17 0749)  Medication Interventions: Patient to call before getting OOB (08/21/17 0749)  Elimination: Needs assistance with toileting (08/21/17 0749)  Elimination Interventions: Call light in reach (08/21/17 5213)  Prior Fall History: No (08/21/17 0749)  History of Falls Interventions: Door open when patient unattended (08/21/17 0749)  Total Score: 3 (08/21/17 0749)  Standard Fall Precautions: Yes (08/21/17 0749)  High Fall Risk: Yes (08/17/17 2236)     Speech Assessment:         Ambulation:  Gait  Distance (ft): 70 Feet (ft) (08/20/17 1323)  Assistive Device: Gait belt;Walker, rolling;Brace/Splint (toe up brace to assist weak DF) (08/20/17 1323)     Labs/Studies:  Recent Results (from the past 72 hour(s))   PROTHROMBIN TIME + INR    Collection Time: 08/19/17  7:33 AM   Result Value Ref Range    Prothrombin time 24.7 (H) 9.6 - 12.0 sec    INR 2.3 (H) 0.9 - 1.2     PROTHROMBIN TIME + INR    Collection Time: 08/20/17  5:34 AM   Result Value Ref Range    Prothrombin time 19.0 (H) 9.6 - 12.0 sec    INR 1.7 (H) 0.9 - 1.2     PROTHROMBIN TIME + INR    Collection Time: 08/21/17  6:31 AM   Result Value Ref Range    Prothrombin time 23.3 (H) 9.6 - 12.0 sec    INR 2.1 (H) 0.9 - 1.2         Assessment:     Problem List as of 8/21/2017  Never Reviewed          Codes Class Noted - Resolved    Spinal stenosis of lumbar region ICD-10-CM: M48.06  ICD-9-CM: 724.02  8/8/2017 - Present        Spondylolisthesis of lumbar region ICD-10-CM: M43.16  ICD-9-CM: 738.4  8/8/2017 - Present        Spinal stenosis ICD-10-CM: M48.00  ICD-9-CM: 724.00  8/8/2017 - Present        Gastro - esophageal reflux disease (Chronic) ICD-9-CM: 530.81  12/19/2009 - Present        Cellulitis and abscess of trunk ICD-10-CM: L03.319, L02.219  ICD-9-CM: 682.2  12/19/2009 - Present    Overview Signed 12/19/2009 10:48 AM by Wild Lr. Secondary to infected right thoracotomy incision.              Hydropneumothorax ICD-10-CM: J94.8  ICD-9-CM: 511.89  12/19/2009 - Present        Anemia ICD-10-CM: D64.9  ICD-9-CM: 285.9  12/10/2009 - Present        Mycetoma (Chronic) ICD-10-CM: B47.9  ICD-9-CM: 039.9  12/1/2009 - Present        Status Post Partial Lobectomy of Lung-mycetoma ICD-10-CM: Z90.2  ICD-9-CM: V45.89  12/1/2009 - Present        Bronchiectasis ([facility].) (Chronic) ICD-10-CM: J47.9  ICD-9-CM: 494.0  12/1/2009 - Present        RESOLVED: Fever ICD-10-CM: R50.9  ICD-9-CM: 780.60  12/10/2009 - 12/11/2009        RESOLVED: Renal failure ICD-10-CM: N19  ICD-9-CM: 508  12/10/2009 - 12/14/2009        RESOLVED: Hyponatremia ICD-10-CM: E87.1  ICD-9-CM: 276.1  12/10/2009 - 12/14/2009        RESOLVED: Hypotension ICD-10-CM: I95.9  ICD-9-CM: 458.9  12/10/2009 - 12/11/2009        RESOLVED: Systemic inflammatory response syndrome due to non-infectious process with acute organ dysfunction ([facility].) ICD-10-CM: R65.11  ICD-9-CM: 995.94  12/10/2009 - 12/14/2009        RESOLVED: Hypoxemia ICD-10-CM: R09.02  ICD-9-CM: 799.02  12/1/2009 - 12/20/2009              Plan: This is a 64 YO with 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 Rehabilitation Plan  Continue PT for a minimum of 1.5 hours a day, at least 5 out of 7 days per week to address bed mobility, transfers, ambulation, strengthening, balance, and endurance. The patient to wear back brace when OOB. Patient to continuee to focus on L ankle dorsiflexion strength. Continue OT for a minimum of 1.5 hours a day, at least 5 out of 7 days per week to address ADL (bathing, LE dressing, toileting) safety; energy conservations and adaptive equipment as needed.         Hx of DVT/ PE, coagulopathy - Anticoagulation management - on long term coumadin secondary to recurrent DVTs/PE. - Continue coumadin, INR- 2.3 on 8/14, lovenox had been stopped. - pharmacy assisting with dosing. INR 2.3->2.0 ( 8/15)->3.1(8/16) >3.2->2.2(8/18)->2.3->1.7( 8/20). Will bridge with Lovenox as high risk for DVT.    - conitnue to monitor closely  -8/21 INR therapeutic, dc Lovenox     Spinal stenosis -  Begun on prednisone PTA for a few weeks. Prednisone tapered, discontinued.      HTN -  Controlled on bystolic. BP in good range.      Bowel mgmt - on scheduled senokot , prn dulcolax, MOM     GI prophylaxis - on pepcid bid     Pain mgmt- advance to oxycodone 5-15mg q4prn, flexeril prn, yesterday received total 45mg of oxycodone. Fair control. Continue to monitor, assess pain level. -  No new pain source. No increase in pain. Takes about 3 tabs roxicodone prn.      Insomnia - prn trazodone     Acute blood loss anemia - Hgb - 11.8 > 12.2 on 8/13. Monitor.   - macrocytic anemia.                 Time spent was 25 minutes with over 1/2 in direct patient care/examination, consultation and coordination of care.      Signed By: Josue Ly MD     August 21, 2017

## 2017-08-21 NOTE — PROGRESS NOTES
PHYSICAL THERAPY DAILY NOTE  Time In: 7747  Time Out: 3363  Patient Seen For: PM;Other (see progress notes); Therapeutic exercise;Gait training;Transfer training    Subjective: Patient had no complaints. Objective: No pain noted. Spinal (back brace on when out of bed, fall precautions)  GROSS ASSESSMENT Daily Assessment    AROM: Generally decreased, functional  Strength: Generally decreased, functional (R Ankle DF- 3/5)       BED/MAT MOBILITY Daily Assessment    Rolling Right : 5 (Supervision)  Rolling Left : 5 (Supervision)  Supine to Sit : 5 (Supervision)  Sit to Supine : 5 (Supervision)       TRANSFERS Daily Assessment    Transfer Type: SPT with walker  Transfer Assistance : 4 (Contact guard assistance)  Sit to Stand Assistance: Contact guard assistance  Car Transfers: Not tested       GAIT Daily Assessment   Decreased distance to work on quality of steps, step length and posture. Patient leans forward with constant cues for posture during gait. Toe up to right foot to assist weak DF. Amount of Assistance: 4 (Minimal assistance)  Distance (ft): 60 Feet (ft)  Assistive Device: Gait belt;Brace/Splint; Walker, rolling       STEPS or STAIRS Daily Assessment    Steps/Stairs Ambulated (#): 4  Level of Assist : 0 (Not tested)  Rail Use: Both       BALANCE Daily Assessment    Sitting - Static: Good (unsupported)  Sitting - Dynamic: Good (unsupported)  Standing - Static: Fair  Standing - Dynamic : Impaired       WHEELCHAIR MOBILITY Daily Assessment    Able to Propel (ft): 0 feet  Functional Level: 0 (Patient's primary mode of locmotion is ambualtion)  Curbs/Ramps Assist Required (FIM Score): 0 (Not tested)  Wheelchair Setup Assist Required : 0 (Not tested)       LOWER EXTREMITY EXERCISES Daily Assessment    Extremity: Both  Exercise Type #1: Supine lower extremity strengthening  Sets Performed: 2  Reps Performed: 10  Level of Assist: Minimal assistance          Assessment: Patient making progress.          Plan of Care: Continue with plan of care to reach PT goals. Returned to room to bed with call bell at reach.     Gilbert Fuchs, PTA  8/21/2017

## 2017-08-21 NOTE — PROGRESS NOTES
Time In 0658   Time Out 0740     Mobility   Score Comments   Supine to Sit 6 (Modified independent)    Transfer Assist 5 Transfer Type: SPT with walker  Comments: S     Activities of Daily Living    Score Comments   Self-Feeding 7 Independent   Bathing 6 Body Parts Bathe: Abdomen, Thigh, right, Arm, left, Arm, right, Buttocks, Chest, Lower leg and foot, left, Lower leg and foot, right, Josselin area, Thigh, left  Comments: LHS   Tub/Shower Transfer [place] 5 Type of Shower: Shower  Adaptive  Equipment:Tub transfer bench, Grab bars and Walker  Comments: SBA   Upper Body  Dressing/  Undressing 6 Items Applied:Pullover (4 steps)  Comments: Reacher to gather clothes   Lower Body Dressing/  Undressing 4 Items Applied:Shoe, left (1 step), Shoe, right (1 step), Sock, left (1 step), Sock, right (1 step), Elastic waist pants (3 steps), Fasten shoe (1 step)  Adaptive Equipment: Reacher, sock aide, elastic shoe lace, and foot funnel  Comments: A to fasten R shoe   Education  Ways to lace shoes     Pt was in bed and agreeable to tx. Pt's performance with ADL is reflected in above chart. Consulted with Dr. Trevor Alcantara regarding pt's condition. Pt was left in recliner with all needs within reach. Continue with POC.      Darrius Groves OTR/L  8/21/2017

## 2017-08-21 NOTE — PROGRESS NOTES
PT WEEKLY PROGRESS NOTE   Time In: 6212   Time Out: 1003    Subjective: \"I'm a little nervous about stairs. \" Patient agreeable to therapy. Objective:   Spinal (back brace on when out of bed, fall precautions)    Outcome Measures: Vital Signs:   Patient Vitals for the past 8 hrs:   Temp Pulse Resp BP SpO2   08/21/17 0814 98.9 °F (37.2 °C) 83 18 115/72 96 %   08/21/17 0809 - 82 - 115/72 -           Pain level: No pain reported. Pain location: n/a  Pain interventions: n/a    Patient education: Educated patient on stair ascend/descend and proper foot placement for safety. Interdisciplinary Communication: Communicated with Iain Jennings regarding patient's POC. AROM: Generally decreased Lumbar ROM secondary to surgery, decreased R Ankle DF    FIM SCORES Initial Assessment Weekly Progress Assessment 8/21/2017   Bed/Chair/Wheelchair Transfers 3 4   Wheelchair Mobility 4 0   Walking [place] 2 4   Steps/Stairs  (NT) 2   Please see C Interdisciplinary Eval: Coordination/Balance Section for details regarding FIM score description. BED/CHAIR/WHEELCHAIR TRANSFERS Initial Assessment Weekly Progress Assessment 8/21/2017   Rolling Right 4 (Minimal assistance) 5 (Supervision)   Rolling Left 4 (Minimal assistance) 5 (Supervision)   Supine to Sit 4 (Minimal assistance) 5 (Supervision)   Sit to Stand Minimal assistance Contact guard assistance   Sit to Supine 4 (Minimal assistance) 5 (Supervision)   Transfer Type SPT with walker (assist with sit to stand and stand to sit) SPT with walker   Comments Increased time and effort with cues for body mechanics for log rolling and supine<>sidelying<>sit<>stand. Patient using bed rail for rolling and sidelying<>sit.  Increased back pain during bed mobility and transfers [place] Not tested Not tested   Car Type         GROSS ASSESSMENT Weekly Progress Assessment 8/21/2017   AROM Generally decreased, functional   Strength Generally decreased, functional (R Ankle DF- 3/5)   Coordination     Tone     Sensation     PROM       POSTURE Weekly Progress Assessment 8/21/2017   Posture (WDL)     Posture Assessment       WHEELCHAIR MOBILITY/MANAGEMENT Initial Assessment Weekly Progress Assessment 8/21/2017   Able to Propel 150 feet 0 feet   Curbs/ramps assistance required 0 (Not tested) 0 (Not tested)   Wheelchair set up assistance required 3 (Moderate assistance)     Wheelchair management Manages left brake, Manages right brake       WALKING INDEPENDENCE Initial Assessment Weekly Progress Assessment 8/21/2017   Assistive device Walker, rolling, Gait belt, Orthotic device (LSO) Gait belt;Walker, rolling;Brace/Splint   Ambulation assistance - level surface 4 (Contact guard assistance) 4 (Contact guard assistance)   Distance 75 Feet (ft) (75ft x 1 w/o DF assist, 25 ft x 1 with DF assist RLE) 170 Feet (ft)   Comments slow cont partial step through gait pattern with flexed posture. Steppage gait pattern RLE during swing due to foot drop on right, PF right foot at initial contact, decreased ankle PF and knee flexion bilaterally at terminal stance, decreased ankle DF and knee ext LLE at initial contact. Slow partial step through gait pattern with flexed posture. R LE makes contact with surface in narrowed stance, decreased foot clearance L LE. GAIT Weekly Progress Assessment 8/21/2017   Gait Description (WDL)     Gait Abnormalities Decreased step clearance;Shuffling gait (R LE narrowed foot placement at IC)     STEPS/STAIRS Initial Assessment Weekly Progress Assessment 8/21/2017   Steps/Stairs ambulated 0 4   Rail Use Both Both   Comments Unable to ambulate up/down steps at this time due to low back pain, impaired balance and decreased strength RLE  Patient ascended with R LE up first and one step at a time cordell and L LE down first with one step at a time cordell. Curbs/Ramps 0 (Not tested)       Patient returned to room sitting in w/c with all needs in reach.        Assessment: Patient continues to make progress towards goals outlined in Care Plan. Patient continues to require assistance with household ambulation and stair ascend/descend secondary to decreased strength in B LE. Patient will benefit from further therapy to improve gait mechanics to decrease fall risk with household ambulation. Patient will also benefit from further therapy to increase functional strength in B LE to increase independence with transfers, stair ascend/descend, and household ambulation. Plan of Care: Please see Care Plan for updated LTGs. Family Training:       Solomon Scriver  8/21/2017

## 2017-08-21 NOTE — PROGRESS NOTES
Warfarin dosing per pharmacist    Caleb Langford is a 63 y.o. male. Indication:  Hx recurrent DVTs and PE    Goal INR:  2.0 - 3.0    Home dose:  10 mg qhs    Risk factors or significant drug interactions:  none    Other anticoagulants:      Daily Monitoring  Date  INR     Warfarin dose  HGB              Notes  8/9  1.0  10 mg   14.3  8/10  1.0  10 mg   12.7  8/11  1.4  10 mg   11.8  8/12  1.4  10 mg   ---  8/13  1.9  10 mg   12.2  8/14  2.3  10 mg    ---  8/15  2.0  2 mg + 10 mg  ---  8/16  3.1  5 mg   ---  8/17  3.2  5 mg   12.2  8/18  2.2  5 mg + 5 mg  ---  8/19  2.3  10 mg   ---  8/20  1.7  10 mg   ---  8/21  2.1  10 mg   ---    Pharmacy consulted to assist with warfarin dosing on 8/13. His warfarin was being held for surgery on 8/8 and was resumed on 8/9. INR 2.1 today. Will continue 10 mg tonight. Pt received a few days of 5 mg so could still be seeing a drop from that. If decrease tomorrow - may want to give one time bolus dose. INR now sub therapeutic - may want to consider adding a bridge. Continue to follow INR daily for now. Pharmacy will continue to follow. Please call with any questions.       Thank you,  Keshawn Garrison, PharmD, [facility]  Clinical Pharmacist  [phone]

## 2017-08-21 NOTE — PROGRESS NOTES
Pt has participated in therapy today without complaints . Resting in bed at this time. ANTHONY ATKINSON  Gave pt pain med for complaints of back pain.

## 2017-08-22 LAB
INR PPP: 2.2 (ref 0.9–1.2)
PROTHROMBIN TIME: 24.3 SEC (ref 9.6–12)

## 2017-08-22 PROCEDURE — 74011250637 HC RX REV CODE- 250/637: Performed by: PHYSICAL MEDICINE & REHABILITATION

## 2017-08-22 PROCEDURE — 77030011256 HC DRSG MEPILEX <16IN NO BORD MOLN -A

## 2017-08-22 PROCEDURE — 97530 THERAPEUTIC ACTIVITIES: CPT

## 2017-08-22 PROCEDURE — 99232 SBSQ HOSP IP/OBS MODERATE 35: CPT | Performed by: PHYSICAL MEDICINE & REHABILITATION

## 2017-08-22 PROCEDURE — 97110 THERAPEUTIC EXERCISES: CPT

## 2017-08-22 PROCEDURE — 97535 SELF CARE MNGMENT TRAINING: CPT

## 2017-08-22 PROCEDURE — 65310000000 HC RM PRIVATE REHAB

## 2017-08-22 PROCEDURE — 97116 GAIT TRAINING THERAPY: CPT

## 2017-08-22 PROCEDURE — 85610 PROTHROMBIN TIME: CPT | Performed by: PHYSICAL MEDICINE & REHABILITATION

## 2017-08-22 RX ADMIN — OXYCODONE HYDROCHLORIDE 15 MG: 5 TABLET ORAL at 08:27

## 2017-08-22 RX ADMIN — OXYCODONE HYDROCHLORIDE 10 MG: 5 TABLET ORAL at 20:22

## 2017-08-22 RX ADMIN — NEBIVOLOL HYDROCHLORIDE 5 MG: 5 TABLET ORAL at 08:27

## 2017-08-22 RX ADMIN — STANDARDIZED SENNA CONCENTRATE AND DOCUSATE SODIUM 2 TABLET: 8.6; 5 TABLET, FILM COATED ORAL at 08:27

## 2017-08-22 RX ADMIN — FAMOTIDINE 20 MG: 20 TABLET ORAL at 08:27

## 2017-08-22 RX ADMIN — FAMOTIDINE 20 MG: 20 TABLET ORAL at 17:21

## 2017-08-22 RX ADMIN — STANDARDIZED SENNA CONCENTRATE AND DOCUSATE SODIUM 2 TABLET: 8.6; 5 TABLET, FILM COATED ORAL at 17:21

## 2017-08-22 RX ADMIN — WARFARIN SODIUM 10 MG: 10 TABLET ORAL at 21:13

## 2017-08-22 NOTE — PROGRESS NOTES
PHYSICAL THERAPY DAILY NOTE  Time In: 1346  Time Out: 6773  Patient Seen For: PM;Other (see progress notes); Therapeutic exercise;Gait training;Transfer training    Subjective: Patient had no complaints. Objective: Discussed with Dr. Brayden Loomis concerning his progress and patient would require w/c for mobility if discharged at this time. Spinal (back brace on when out of bed, fall precautions)  GROSS ASSESSMENT Daily Assessment            BED/MAT MOBILITY Daily Assessment    Supine to Sit : 5 (Stand-by assistance)  Sit to Supine : 5 (Supervision)       TRANSFERS Daily Assessment    Transfer Type: SPT with walker  Transfer Assistance : 4 (Contact guard assistance)  Sit to Stand Assistance: Contact guard assistance       GAIT Daily Assessment    Amount of Assistance: 4 (Minimal assistance)  Distance (ft): 50 Feet (ft)  Assistive Device: Gait belt;Brace/Splint; Walker, rolling       STEPS or STAIRS Daily Assessment    Level of Assist : 0 (Not tested)       BALANCE Daily Assessment            WHEELCHAIR MOBILITY Daily Assessment            LOWER EXTREMITY EXERCISES Daily Assessment    Extremity: Both  Exercise Type #1: Other (comment) (pregait exs in II bars)  Sets Performed: 1  Reps Performed: 10  Level of Assist: Minimal assistance          Assessment: Patient making progress but weakness in right leg hindering mobility. Plan of Care: Continue with plan of care to reach PT goals. Returned to room with call boles at reach.     Nighat Long, PTA  8/22/2017

## 2017-08-22 NOTE — PROGRESS NOTES
Warfarin dosing per pharmacist    Edgar Montano is a 63 y.o. male. Indication:  Hx recurrent DVTs and PE    Goal INR:  2.0 - 3.0    Home dose:  10 mg qhs    Risk factors or significant drug interactions:  none    Other anticoagulants:      Daily Monitoring  Date  INR     Warfarin dose  HGB              Notes  8/9  1.0  10 mg   14.3  8/10  1.0  10 mg   12.7  8/11  1.4  10 mg   11.8  8/12  1.4  10 mg   ---  8/13  1.9  10 mg   12.2  8/14  2.3  10 mg    ---  8/15  2.0  2 mg + 10 mg  ---  8/16  3.1  5 mg   ---  8/17  3.2  5 mg   12.2  8/18  2.2  5 mg + 5 mg  ---  8/19  2.3  10 mg   ---  8/20  1.7  10 mg   ---  8/21  2.1  10 mg   ---  8/22  2.2  10 mg   ---    Pharmacy consulted to assist with warfarin dosing on 8/13. His warfarin was being held for surgery on 8/8 and was resumed on 8/9. INR 2.2. No changes, continue 10 mg tonight. D/c lovenox with INR therapeutic x 2. Following daily. Thank you,  Anya Greene, Pharm. D.   Clinical Pharmacist  [phone]

## 2017-08-22 NOTE — PROGRESS NOTES
OT Daily Note  Time In 1115   Time Out 1200     Pain: Patient had no complaint of pain. Functional Mobility   Pt walked with S with RW. Activity Tolerance   Pt stood for approximately 13 minutes while engaging in one handed activity. Pt had no LOB. Pt completed 10 minutes on the [employer] on [place] and went 0.48 miles. Self-Care   Pt donned back brace and shoes with s/u. Education   Suction grab bars and shower chairs     Interdisciplinary Communication: Pt's communication board was updated with the most pertinent information. Plan: Continue with POC. Pt was left in w/c with all needs within reach.      Graeme De Paz OTR/L  8/22/2017

## 2017-08-22 NOTE — PROGRESS NOTES
Julio Smith MD,   Medical Director  [address] [facility], [address] [facility]  Tel: [phone]       [facility] PROGRESS NOTE    Raghav Burton  Admit Date: 8/12/2017  Admit Diagnosis: Post Spinal Surgery Rehab;Spinal stenosis of lumbar region    Subjective     Doing well. Back pain without radicular symptoms. Incisional and msk pain 4/10. BB continent. Progressing in therapies. Not following spinal precautions well per therapists    Objective:     Current Facility-Administered Medications   Medication Dose Route Frequency    enoxaparin (LOVENOX) injection 40 mg  40 mg SubCUTAneous Q24H    warfarin (COUMADIN) tablet 10 mg  10 mg Oral QHS    oxyCODONE IR (ROXICODONE) tablet 5-15 mg  5-15 mg Oral Q4H PRN    traZODone (DESYREL) tablet 50 mg  50 mg Oral QHS PRN    senna-docusate (PERICOLACE) 8.6-50 mg per tablet 2 Tab  2 Tab Oral BID    bisacodyl (DULCOLAX) suppository 10 mg  10 mg Rectal DAILY PRN    bisacodyl (DULCOLAX) tablet 5 mg  5 mg Oral DAILY PRN    cyclobenzaprine (FLEXERIL) tablet 5 mg  5 mg Oral TID PRN    magnesium hydroxide (MILK OF MAGNESIA) 400 mg/5 mL oral suspension 30 mL  30 mL Oral DAILY PRN    alum-mag hydroxide-simeth (MYLANTA) oral suspension 30 mL  30 mL Oral Q4H PRN    testosterone enanthate (DELATESTRYL) injection 200 mg  200 mg IntraMUSCular Q 14 DAYS    famotidine (PEPCID) tablet 20 mg  20 mg Oral BID    ondansetron (ZOFRAN ODT) tablet 4 mg  4 mg Oral Q6H PRN    nebivolol (BYSTOLIC) tablet 5 mg  5 mg Oral DAILY     Review of Systems:Denies chest pain, shortness of breath, cough, headache, visual problems, abdominal pain, dysurea, calf pain. Pertinent positives are as noted in the medical records and unremarkable otherwise. Visit Vitals    /72    Pulse 75    Temp 98.9 °F (37.2 °C)    Resp 16    SpO2 97%        Physical Exam:   General: Alert and age appropriately oriented. No acute cardio respiratory distress. HEENT: Normocephalic,no scleral icterus  Oral mucosa moist without cyanosis   Lungs: Clear to auscultation  bilaterally. Respiration even and unlabored   Heart: Regular rate and rhythm, S1, S2   No  murmurs, clicks, rub or gallops   Abdomen: Soft, non-tender, nondistended. Bowel sounds present. No organomegaly. Genitourinary: Benign . Neuromuscular:      RLE hip flexion slt weaker than right. Atrophy noted in right quads. Limited DF on the right 3/5  No sensory deficits distally. Exam limited by pain. Skin/extremity: No rashes, no erythema. No calf tenderness BLE  Wound c/d/i.  No peripheral edema                                                                            Functional Assessment:  Gross Assessment  AROM: Generally decreased, functional (08/21/17 1000)  Strength: Generally decreased, functional (R Ankle DF- 3/5) (08/21/17 1000)  Sensation: Impaired (08/18/17 1500)       Balance  Sitting - Static: Good (unsupported) (08/21/17 1000)  Sitting - Dynamic: Good (unsupported) (08/21/17 1000)  Standing - Static: Fair (08/21/17 1000)  Standing - Dynamic : Impaired (08/21/17 1000)           Toileting  Cues: Physical assistance for pants down (08/16/17 1021)         [place]ows Fall Risk Assessment:  [place] Fall Risk  Mobility: Ambulates or transfers with assist devices or assistance/unsteady gait (08/21/17 1958)  Mobility Interventions: Patient to call before getting OOB (08/21/17 1958)  Mentation: Alert, oriented x 3 (08/21/17 1958)  Mentation Interventions: Door open when patient unattended (08/21/17 1958)  Medication: Patient receiving anticonvulsants, sedatives(tranquilizers), psychotropics or hypnotics, hypoglycemics, narcotics, sleep aids, antihypertensives, laxatives, or diuretics (08/21/17 1958)  Medication Interventions: Patient to call before getting OOB (08/21/17 1958)  Elimination: Needs assistance with toileting (08/21/17 1958)  Elimination Interventions: Call light in reach (08/21/17 1958)  Prior Fall History: No (08/21/17 1958)  History of Falls Interventions: Door open when patient unattended (08/21/17 1958)  Total Score: 3 (08/21/17 1958)  Standard Fall Precautions: Yes (08/21/17 1958)  High Fall Risk: Yes (08/17/17 2236)     Speech Assessment:         Ambulation:  Gait  Speed/Lillie: Pace decreased (<100 feet/min); Slow (08/21/17 1000)  Step Length: Left shortened;Right shortened (08/21/17 1000)  Swing Pattern: Right asymmetrical (08/21/17 1000)  Gait Abnormalities: Decreased step clearance;Shuffling gait (R LE narrowed foot placement at IC) (08/21/17 1000)  Distance (ft): 60 Feet (ft) (08/21/17 1618)  Assistive Device: Gait belt;Brace/Splint; Walker, rolling (08/21/17 1618)  Rail Use: Both (08/21/17 1000)     Labs/Studies:  Recent Results (from the past 72 hour(s))   PROTHROMBIN TIME + INR    Collection Time: 08/19/17  7:33 AM   Result Value Ref Range    Prothrombin time 24.7 (H) 9.6 - 12.0 sec    INR 2.3 (H) 0.9 - 1.2     PROTHROMBIN TIME + INR    Collection Time: 08/20/17  5:34 AM   Result Value Ref Range    Prothrombin time 19.0 (H) 9.6 - 12.0 sec    INR 1.7 (H) 0.9 - 1.2     PROTHROMBIN TIME + INR    Collection Time: 08/21/17  6:31 AM   Result Value Ref Range    Prothrombin time 23.3 (H) 9.6 - 12.0 sec    INR 2.1 (H) 0.9 - 1.2         Assessment:     Problem List as of 8/22/2017  Never Reviewed          Codes Class Noted - Resolved    Personal history of DVT (deep vein thrombosis) ICD-10-CM: R90.717  ICD-9-CM: V12.51  8/21/2017 - Present        * (Principal)Spinal stenosis of lumbar region ICD-10-CM: M48.06  ICD-9-CM: 724.02  8/8/2017 - Present        Spondylolisthesis of lumbar region ICD-10-CM: M43.16  ICD-9-CM: 738.4  8/8/2017 - Present        Spinal stenosis ICD-10-CM: M48.00  ICD-9-CM: 724.00  8/8/2017 - Present        Gastro - esophageal reflux disease (Chronic) ICD-9-CM: 530.81  12/19/2009 - Present        Cellulitis and abscess of trunk ICD-10-CM: L03.319, L02.219  ICD-9-CM: 682.2  12/19/2009 - Present    Overview Signed 12/19/2009 10:48 AM by Sharon Desai. Secondary to infected right thoracotomy incision. Hydropneumothorax ICD-10-CM: J94.8  ICD-9-CM: 511.89  12/19/2009 - Present        Anemia ICD-10-CM: D64.9  ICD-9-CM: 285.9  12/10/2009 - Present        Mycetoma (Chronic) ICD-10-CM: B47.9  ICD-9-CM: 039.9  12/1/2009 - Present        Status Post Partial Lobectomy of Lung-mycetoma ICD-10-CM: Z90.2  ICD-9-CM: V45.89  12/1/2009 - Present        Bronchiectasis ([facility] (Chronic) ICD-10-CM: J47.9  ICD-9-CM: 494.0  12/1/2009 - Present        RESOLVED: Fever ICD-10-CM: R50.9  ICD-9-CM: 780.60  12/10/2009 - 12/11/2009        RESOLVED: Renal failure ICD-10-CM: N19  ICD-9-CM: 693  12/10/2009 - 12/14/2009        RESOLVED: Hyponatremia ICD-10-CM: E87.1  ICD-9-CM: 276.1  12/10/2009 - 12/14/2009        RESOLVED: Hypotension ICD-10-CM: I95.9  ICD-9-CM: 458.9  12/10/2009 - 12/11/2009        RESOLVED: Systemic inflammatory response syndrome due to non-infectious process with acute organ dysfunction (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      Rehabilitation Plan  Continue PT for a minimum of 1.5 hours a day, at least 5 out of 7 days per week to address bed mobility, transfers, ambulation, strengthening, balance, and endurance. The patient to wear back brace when OOB. Patient to continuee to focus on L ankle dorsiflexion strength.   Continue OT for a minimum of 1.5 hours a day, at least 5 out of 7 days per week to address ADL (bathing, LE dressing, toileting) safety; energy conservations and adaptive equipment as needed.  -8/22 stress spinal precautions!! Cont brace when oob          Hx of DVT/ PE, coagulopathy - Anticoagulation management - on long term coumadin secondary to recurrent DVTs/PE. - Continue coumadin, INR- 2.3 on 8/14, lovenox had been stopped. - pharmacy assisting with dosing. INR 2.3->2.0 ( 8/15)->3.1(8/16) >3.2->2.2(8/18)->2.3->1.7( 8/20). Will bridge with Lovenox as high risk for DVT. - conitnue to monitor closely  -8/21 INR therapeutic, dc Lovenox; 8/22 Pt PEND; ? CHANGE TO M,W,F      Spinal stenosis -  Begun on prednisone PTA for a few weeks. Prednisone tapered, discontinued.       HTN -  Controlled on bystolic. BP in good range.       Bowel mgmt - on scheduled senokot , prn dulcolax, MOM      GI prophylaxis - on pepcid bid      Pain mgmt- advance to oxycodone 5-15mg q4prn, flexeril prn, yesterday received total 45mg of oxycodone. Fair control. Continue to monitor, assess pain level. -  No new pain source. No increase in pain. Takes about 3 tabs roxicodone prn.       Insomnia - prn trazodone      Acute blood loss anemia - Hgb - 11.8 > 12.2 on 8/13. Monitor.   - macrocytic anemia.         Time spent was 25 minutes with over 1/2 in direct patient care/examination, consultation and coordination of care.      Signed By: Vito Posadas MD     August 22, 2017

## 2017-08-22 NOTE — PROGRESS NOTES
Report received on pts history and status from [facility]. Pt sitting up in bedside chair.

## 2017-08-22 NOTE — PROGRESS NOTES
Assessment completed, respiration even and unlabored, instructed to call for needs or assist , educated on risk and needs not to get up without assist, call light in reach, denies any c/o at present

## 2017-08-22 NOTE — PROGRESS NOTES
PHYSICAL THERAPY DAILY NOTE  Time In: 7386  Time Out: 1005  Patient Seen For: AM;Other (see progress notes); Therapeutic exercise;Gait training;Transfer training    Subjective: Patient had no complaints. Objective: No pain noted. Spinal (back brace on when out of bed, fall precautions)  GROSS ASSESSMENT Daily Assessment            BED/MAT MOBILITY Daily Assessment    Supine to Sit : 5 (Supervision)  Sit to Supine : 5 (Supervision)       TRANSFERS Daily Assessment    Transfer Type: SPT with walker  Transfer Assistance : 4 (Contact guard assistance)  Sit to Stand Assistance: Contact guard assistance       GAIT Daily Assessment    Amount of Assistance: 4 (Minimal assistance)  Distance (ft): 60 Feet (ft)  Assistive Device: Gait belt;Walker, rolling;Brace/Splint       STEPS or STAIRS Daily Assessment    Level of Assist : 0 (Not tested)       BALANCE Daily Assessment            WHEELCHAIR MOBILITY Daily Assessment            LOWER EXTREMITY EXERCISES Daily Assessment    Extremity: Both  Exercise Type #1: Supine lower extremity strengthening  Sets Performed: 2  Reps Performed: 15  Level of Assist: Minimal assistance          Assessment: Patient making good progress but stillunsafe with ambulation. Plan of Care: Continue with plan of care to reach PT goals. Returned to room with call boles at [facility].     Walter Green, KATH  8/22/2017

## 2017-08-22 NOTE — PROGRESS NOTES
Time In 0701   Time Out 0740     Activities of Daily Living    Score Comments   Self-Feeding 7 Independent   Bathing 6 Body Parts Bathe: Abdomen, Arm, left, Arm, right, Buttocks, Chest, Lower leg and foot, left, Lower leg and foot, right, Josselin area, Thigh, left, Thigh, right  Comments: LHS   Tub/Shower Transfer [place] 5 Type of Shower: Shower  Adaptive  Equipment:Tub transfer bench, Grab bars and Walker  Comments: S   Upper Body  Dressing/  Undressing 7 Items Applied:Pullover (4 steps)  Comments: I   Lower Body Dressing/  Undressing 5 Items Applied:Shoe, left (1 step), Shoe, right (1 step), Sock, left (1 step), Sock, right (1 step), Elastic waist pants (3 steps)  Adaptive Equipment: Foot funnel, elastic shoelace, reacher, sock aide  Comments: SBA   Education  Equipment needs for home     Pt was in bed and agreeable to tx. Pt's performance with ADL is reflected in above chart. Discussed wound care with pt. Pt was left in recliner with all needs within reach. Continue with POC.      Severiano Niño OTR/STANLEY  8/22/2017

## 2017-08-22 NOTE — PROGRESS NOTES
OT Daily Note  Time In 1115   Time Out 1157     Pain: Patient had no complaint of pain. Functional Mobility   Pt transferred on and off Nustep without device with CGA and poor adherence to spinal precautions. Activity Tolerance   Pt completed 15 minutes on the NuStep on Level 3 and went 0.75 miles. Pt stood for 6.5 mins with S while engaging in B hand activity without a LOB to improve ability to complete simple IADL. Self-Care   Pt engaged in donning and doffing R shoe with shoe funnel and reacher with new lacing strategy. Pt demonstrated modified independence. Education   Spinal precautions     Interdisciplinary Communication: KATH Frankel on pt's performance    Plan: Continue with POC. Pt was left in w/c with all needs within reach.      Marky Blanton OTR/L  8/22/2017

## 2017-08-22 NOTE — PROGRESS NOTES
Spoke to Jaz Loza With [facility]. Clinicals faxed. Pt approved for a total of 14 days through 8/24. Will need to fax update 8/24after conference if dc scheduled after 8/25.

## 2017-08-23 LAB
INR PPP: 2.3 (ref 0.9–1.2)
PROTHROMBIN TIME: 25 SEC (ref 9.6–12)

## 2017-08-23 PROCEDURE — 65310000000 HC RM PRIVATE REHAB

## 2017-08-23 PROCEDURE — 36415 COLL VENOUS BLD VENIPUNCTURE: CPT | Performed by: PHYSICAL MEDICINE & REHABILITATION

## 2017-08-23 PROCEDURE — 74011250637 HC RX REV CODE- 250/637: Performed by: PHYSICAL MEDICINE & REHABILITATION

## 2017-08-23 PROCEDURE — 85610 PROTHROMBIN TIME: CPT | Performed by: PHYSICAL MEDICINE & REHABILITATION

## 2017-08-23 PROCEDURE — 97530 THERAPEUTIC ACTIVITIES: CPT

## 2017-08-23 PROCEDURE — 97110 THERAPEUTIC EXERCISES: CPT

## 2017-08-23 PROCEDURE — 97116 GAIT TRAINING THERAPY: CPT

## 2017-08-23 PROCEDURE — 99232 SBSQ HOSP IP/OBS MODERATE 35: CPT | Performed by: PHYSICAL MEDICINE & REHABILITATION

## 2017-08-23 PROCEDURE — 97535 SELF CARE MNGMENT TRAINING: CPT

## 2017-08-23 RX ADMIN — WARFARIN SODIUM 10 MG: 10 TABLET ORAL at 21:31

## 2017-08-23 RX ADMIN — OXYCODONE HYDROCHLORIDE 15 MG: 5 TABLET ORAL at 13:12

## 2017-08-23 RX ADMIN — TRAZODONE HYDROCHLORIDE 50 MG: 50 TABLET ORAL at 22:48

## 2017-08-23 RX ADMIN — FAMOTIDINE 20 MG: 20 TABLET ORAL at 18:17

## 2017-08-23 RX ADMIN — FAMOTIDINE 20 MG: 20 TABLET ORAL at 08:01

## 2017-08-23 RX ADMIN — NEBIVOLOL HYDROCHLORIDE 5 MG: 5 TABLET ORAL at 08:01

## 2017-08-23 RX ADMIN — OXYCODONE HYDROCHLORIDE 15 MG: 5 TABLET ORAL at 08:01

## 2017-08-23 RX ADMIN — OXYCODONE HYDROCHLORIDE 15 MG: 5 TABLET ORAL at 22:48

## 2017-08-23 NOTE — PROGRESS NOTES
Time In 0659   Time Out 0737     Activities of Daily Living    Score Comments   Self-Feeding 7 Indpendent   Bathing 6 Body Parts Bathe: Abdomen, Arm, left, Arm, right, Buttocks, Chest, Lower leg and foot, left, Lower leg and foot, right, Josselin area, Thigh, left, Thigh, right  Comments: LHS   Tub/Shower Transfer [place] 5 Type of Shower: Shower  Adaptive  Equipment:Tub transfer bench, Grab bars and Walker  Comments: S   Upper Body  Dressing/  Undressing 7 Items Applied:Pullover (4 steps)  Comments: I   Lower Body Dressing/  Undressing 5 Items Applied:Shoe, left (1 step), Shoe, right (1 step), Sock, left (1 step), Sock, right (1 step), Elastic waist pants (3 steps)  Adaptive Equipment: Reacher, sock aide, foot funnel, and elastic shoe lace  Comments: SBA   Education  Techniques to pick items off the floor     Pt was in bed and agreeable to tx. Pt's performance with ADL is reflected in above chart. Pt was left in recliner with all needs within reach. Continue with MARIA C Pugh OTR/L  8/23/2017

## 2017-08-23 NOTE — PROGRESS NOTES
Subjective \"I am doing great. Thank you for taking me outside. \"   Activity Horseshoe toss with 1 lb wrist weights   Strength/Endurance Patient tolerated the session with no c/o tiredness. He propelled his w/c throughout the floor. Balance NT   Social Interaction Patient was friendly and initiated conversation with this therapist.   Cognitive A&O X4   Comments Patient was assisted to his room and left with all needs within reach.      Gely Gandara, CTRS

## 2017-08-23 NOTE — PROGRESS NOTES
PHYSICAL THERAPY DAILY NOTE  Time In: 0831  Time Out: 9638  Patient Seen For: AM;Balance activities;Gait training;Patient education; Therapeutic exercise;Transfer training; Other (see progress notes)    Subjective: patient reporting he felt stiff and was hurting a little this AM but now that he is moving around he feels better. Reports no increase in back pain during treatment. Reports he feels like he is walking better and his posture is better         Objective:Vital Signs:  Patient Vitals for the past 12 hrs:   Temp Pulse Resp BP SpO2   08/23/17 0801 99.4 °F (37.4 °C) 86 18 122/79 91 %     Pain level: 2 to 4 out of 10  Pain location:low back  Pain interventions:Pain medication, rest, positioning,posture correction    Patient education:Balance training,transfer training, gait training, fall precautions, activity pacing, spinal precautions, body mechanics, posture correction,Patient verbalizing understanding and demonstrating partial understanding of patient education. Recommend follow up education.     Interdisciplinary Communication:NA    Spinal (back brace on when out of bed, fall precautions)  GROSS ASSESSMENT Daily Assessment     Independent with adjusting LSO       BED/MAT MOBILITY Daily Assessment    Rolling Right : 0 (Not tested)  Rolling Left : 0 (Not tested)  Supine to Sit : 0 (Not tested)  Sit to Supine : 0 (Not tested)       TRANSFERS Daily Assessment   Demonstrating improved body mechanics with sit<>stand Transfer Type: SPT with walker  Transfer Assistance : 5 (Stand-by assistance)  Sit to Stand Assistance: Stand-by assistance  Car Transfers: Not tested       GAIT Daily Assessment   Adjusted height of RW by lowering it 1 inch to facilitate improved elbow extension with improved upright posture while maintaining body position inside frame of RW Amount of Assistance: 5 (Stand-by assistance)  Distance (ft): 75 Feet (ft) (75ft x 2)  Assistive Device: Walker, rolling;Orthotic device (LSO, [place] foot up device) Gait training x 60ft x 4 in parallel bars facilitating upright midline posture with improved stance stability and inhibiting trendelenburg during stance phase, facilitating improved width of base of support inhibiting excessive adduction of LLE during swing phase with verbal and visual cues. STEPS or STAIRS Daily Assessment    Steps/Stairs Ambulated (#): 0  Level of Assist : 0 (Not tested)       BALANCE Daily Assessment   Static standing balance activities in parallel  Bars with left foot on 3 inch step facilitating upright posture with hip stability inhibiting trendelenburg on Right stance LE. Alternating single UE support x 10 reps followed 5 5 reps of no UE support. Completed 3 sets with verbal, visual and tactile cues  Sitting - Static: Good (unsupported)  Sitting - Dynamic: Good (unsupported)  Standing - Static: Fair (with RW)  Standing - Dynamic : Impaired       WHEELCHAIR MOBILITY Daily Assessment    Curbs/Ramps Assist Required (FIM Score): 0 (Not tested)  Wheelchair Setup Assist Required : 4 (Minimal assistance)  Wheelchair Management: Manages left brake;Manages right brake       LOWER EXTREMITY EXERCISES Daily Assessment     NA          Assessment: Core and hip strength slowly improving with improved stance stability during gait training with RW. Gait posture slowly improving. Limiting gait distance on purpose secondary to focusing on stance stability and posture control during gait training. Patient to recreational therapy at end of treatment    Plan of Care: Continue with POC and progress as tolerated.      Duarte Manjarrez, PT  8/23/2017

## 2017-08-23 NOTE — PROGRESS NOTES
OT Daily Note  Time In 1114   Time Out 1200     Pain: Patient had no complaint of pain. Functional Mobility   Pt went from supine to sit with mod I. Pt walked to gym with SBA with RW. Self-Care   Attempted to get off the toilet without grab bar. Pt was unable. Recommend BSC over toilet. Pt reports he has access to [facility]. Pt engaged in picking four items off the floor using localbacon's reach to maintain spinal precautions. Pt demonstrated fair form. Recommended using reacher when possible still. Activity Tolerance   Pt stood for approximately 15 minutes while engaging in one handed activity with no LOB. Education   Downward reach     Interdisciplinary Communication: PT Eran Parisi on gait    Plan: Continue with POC. Pt was left in w/c with all needs within reach.      Morgan Hull OTR/L  8/23/2017

## 2017-08-23 NOTE — PROGRESS NOTES
Warfarin dosing per pharmacist    Ravi Reyes is a 63 y.o. male. Indication:  Hx recurrent DVTs and PE    Goal INR:  2.0 - 3.0    Home dose:  10 mg qhs    Risk factors or significant drug interactions:  none    Other anticoagulants:      Daily Monitoring  Date  INR     Warfarin dose  HGB              Notes  8/9  1.0  10 mg   14.3  8/10  1.0  10 mg   12.7  8/11  1.4  10 mg   11.8  8/12  1.4  10 mg   ---  8/13  1.9  10 mg   12.2  8/14  2.3  10 mg    ---  8/15  2.0  2 mg + 10 mg  ---  8/16  3.1  5 mg   ---  8/17  3.2  5 mg   12.2  8/18  2.2  5 mg + 5 mg  ---  8/19  2.3  10 mg   ---  8/20  1.7  10 mg   ---  8/21  2.1  10 mg   ---  8/22  2.2  10 mg   ---  8/23  2.3  10 mg   ---        Pharmacy consulted to assist with warfarin dosing on 8/13. His warfarin was being held for surgery on 8/8 and was resumed on 8/9. INR remains within therapeutic range at 2.3 today, so will continue dosing at home dose of 10 mg every night. As INR appears stable and within range, will check INR on [facility] schedule moving forward. Will also plan to check hemoglobin level on Friday as it has not been evaluated in a week. Will continue to follow.      Thank you,  Artur Patricio, PharmD  Clinical Pharmacist  [phone]

## 2017-08-23 NOTE — PROGRESS NOTES
De Steward MD,   Medical Director  [address] [facility], [address] [facility]  Tel: [phone]       [facility] PROGRESS NOTE    [place] Floor  Admit Date: 8/12/2017  Admit Diagnosis: Post Spinal Surgery Rehab;Spinal stenosis of lumbar region    Subjective     Pain controlled . No cp, sob, n/v. Slept well. Therapies going well. Objective:     Current Facility-Administered Medications   Medication Dose Route Frequency    warfarin (COUMADIN) tablet 10 mg  10 mg Oral QHS    oxyCODONE IR (ROXICODONE) tablet 5-15 mg  5-15 mg Oral Q4H PRN    traZODone (DESYREL) tablet 50 mg  50 mg Oral QHS PRN    senna-docusate (PERICOLACE) 8.6-50 mg per tablet 2 Tab  2 Tab Oral BID    bisacodyl (DULCOLAX) suppository 10 mg  10 mg Rectal DAILY PRN    bisacodyl (DULCOLAX) tablet 5 mg  5 mg Oral DAILY PRN    cyclobenzaprine (FLEXERIL) tablet 5 mg  5 mg Oral TID PRN    magnesium hydroxide (MILK OF MAGNESIA) 400 mg/5 mL oral suspension 30 mL  30 mL Oral DAILY PRN    alum-mag hydroxide-simeth (MYLANTA) oral suspension 30 mL  30 mL Oral Q4H PRN    testosterone enanthate (DELATESTRYL) injection 200 mg  200 mg IntraMUSCular Q 14 DAYS    famotidine (PEPCID) tablet 20 mg  20 mg Oral BID    ondansetron (ZOFRAN ODT) tablet 4 mg  4 mg Oral Q6H PRN    nebivolol (BYSTOLIC) tablet 5 mg  5 mg Oral DAILY     Review of Systems:Denies chest pain, shortness of breath, cough, headache, visual problems, abdominal pain, dysurea, calf pain. Pertinent positives are as noted in the medical records and unremarkable otherwise. Visit Vitals    /79    Pulse 86    Temp 99.4 °F (37.4 °C)    Resp 18    SpO2 91%        Physical Exam:   General: Alert and age appropriately oriented. No acute cardio respiratory distress. HEENT: Normocephalic,no scleral icterus  Oral mucosa moist without cyanosis   Lungs: Clear to auscultation  bilaterally.   Respiration even and unlabored   Heart: Regular rate and rhythm, S1, S2   No  murmurs, clicks, rub or gallops   Abdomen: Soft, non-tender, nondistended. Bowel sounds present. No organomegaly. Genitourinary: Benign . Neuromuscular:      Mild hip flexion weakness, still limited DF on the right 3/5. No sensory deficits. No clonus   Skin/extremity: No rashes, no erythema. No calf tenderness BLE  Wound with steri strips, dry, well approximated, no erythema                                                                            Functional Assessment:  Gross Assessment  AROM: Generally decreased, functional (08/21/17 1000)  Strength: Generally decreased, functional (R Ankle DF- 3/5) (08/21/17 1000)  Sensation: Impaired (08/18/17 1500)       Balance  Sitting - Static: Good (unsupported) (08/21/17 1000)  Sitting - Dynamic: Good (unsupported) (08/21/17 1000)  Standing - Static: Fair (08/21/17 1000)  Standing - Dynamic : Impaired (08/21/17 1000)           Toileting  Cues: Physical assistance for pants down (08/16/17 1021)         Max Lopez Fall Risk Assessment:  Max Lopez Fall Risk  Mobility: Ambulates or transfers with assist devices or assistance/unsteady gait (08/23/17 0213)  Mobility Interventions: Patient to call before getting OOB;Utilize walker, cane, or other assitive device (08/23/17 3319)  Mentation: Alert, oriented x 3 (08/23/17 3596)  Mentation Interventions: Door open when patient unattended;Eyeglasses and hearing aids (08/22/17 0800)  Medication: Patient receiving anticonvulsants, sedatives(tranquilizers), psychotropics or hypnotics, hypoglycemics, narcotics, sleep aids, antihypertensives, laxatives, or diuretics (08/23/17 0213)  Medication Interventions: Patient to call before getting OOB; Teach patient to arise slowly (08/23/17 9711)  Elimination: Needs assistance with toileting (08/23/17 9463)  Elimination Interventions: Call light in reach; Toileting schedule/hourly rounds (08/23/17 1563)  Prior Fall History: No (08/23/17 7083)  History of Falls Interventions: Door open when patient unattended (08/22/17 0800)  Total Score: 3 (08/23/17 0213)  Standard Fall Precautions: Yes (08/23/17 0213)  High Fall Risk: Yes (08/17/17 2236)     Speech Assessment:         Ambulation:  Gait  Speed/Lillie: Pace decreased (<100 feet/min); Slow (08/21/17 1000)  Step Length: Left shortened;Right shortened (08/21/17 1000)  Swing Pattern: Right asymmetrical (08/21/17 1000)  Gait Abnormalities: Decreased step clearance;Shuffling gait (R LE narrowed foot placement at IC) (08/21/17 1000)  Distance (ft): 50 Feet (ft) (08/22/17 1609)  Assistive Device: Gait belt;Brace/Splint; Walker, rolling (08/22/17 1609)  Rail Use: Both (08/21/17 1000)     Labs/Studies:  Recent Results (from the past 72 hour(s))   PROTHROMBIN TIME + INR    Collection Time: 08/21/17  6:31 AM   Result Value Ref Range    Prothrombin time 23.3 (H) 9.6 - 12.0 sec    INR 2.1 (H) 0.9 - 1.2     PROTHROMBIN TIME + INR    Collection Time: 08/22/17  7:06 AM   Result Value Ref Range    Prothrombin time 24.3 (H) 9.6 - 12.0 sec    INR 2.2 (H) 0.9 - 1.2     PROTHROMBIN TIME + INR    Collection Time: 08/23/17  6:46 AM   Result Value Ref Range    Prothrombin time 25.0 (H) 9.6 - 12.0 sec    INR 2.3 (H) 0.9 - 1.2         Assessment:     Problem List as of 8/23/2017  Never Reviewed          Codes Class Noted - Resolved    Personal history of DVT (deep vein thrombosis) ICD-10-CM: T11.411  ICD-9-CM: V12.51  8/21/2017 - Present        * (Principal)Spinal stenosis of lumbar region ICD-10-CM: M48.06  ICD-9-CM: 724.02  8/8/2017 - Present        Spondylolisthesis of lumbar region ICD-10-CM: M43.16  ICD-9-CM: 738.4  8/8/2017 - Present        Spinal stenosis ICD-10-CM: M48.00  ICD-9-CM: 724.00  8/8/2017 - Present        Gastro - esophageal reflux disease (Chronic) ICD-9-CM: 530.81  12/19/2009 - Present        Cellulitis and abscess of trunk ICD-10-CM: L03.319, L02.219  ICD-9-CM: 682.2  12/19/2009 - Present    Overview Signed 12/19/2009 10:48 AM by Kahlil Gaytan. Secondary to infected right thoracotomy incision. Hydropneumothorax ICD-10-CM: J94.8  ICD-9-CM: 511.89  12/19/2009 - Present        Anemia ICD-10-CM: D64.9  ICD-9-CM: 285.9  12/10/2009 - Present        Mycetoma (Chronic) ICD-10-CM: B47.9  ICD-9-CM: 039.9  12/1/2009 - Present        Status Post Partial Lobectomy of Lung-mycetoma ICD-10-CM: Z90.2  ICD-9-CM: V45.89  12/1/2009 - Present        Bronchiectasis ([facility] [address] (Chronic) ICD-10-CM: J47.9  ICD-9-CM: 494.0  12/1/2009 - Present        RESOLVED: Fever ICD-10-CM: R50.9  ICD-9-CM: 780.60  12/10/2009 - 12/11/2009        RESOLVED: Renal failure ICD-10-CM: N19  ICD-9-CM: 198  12/10/2009 - 12/14/2009        RESOLVED: Hyponatremia ICD-10-CM: E87.1  ICD-9-CM: 276.1  12/10/2009 - 12/14/2009        RESOLVED: Hypotension ICD-10-CM: I95.9  ICD-9-CM: 458.9  12/10/2009 - 12/11/2009        RESOLVED: Systemic inflammatory response syndrome due to non-infectious process with acute organ dysfunction ([facility] [address] ICD-10-CM: R65.11  ICD-9-CM: 995.94  12/10/2009 - 12/14/2009        RESOLVED: Hypoxemia ICD-10-CM: R09.02  ICD-9-CM: 799.02  12/1/2009 - 12/20/2009              Plan: This is a 62 YO with 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       Rehabilitation Plan  Continue PT for a minimum of 1.5 hours a day, at least 5 out of 7 days per week to address bed mobility, transfers, ambulation, strengthening, balance, and endurance. The patient to wear back brace when OOB. Patient to continuee to focus on L ankle dorsiflexion strength.   Continue OT for a minimum of 1.5 hours a day, at least 5 out of 7 days per week to address ADL (bathing, LE dressing, toileting) safety; energy conservations and adaptive equipment as needed.  -8/22 stress spinal precautions!! Cont brace when oob          Hx of DVT/ PE, coagulopathy - Anticoagulation management - on long term coumadin secondary to recurrent DVTs/PE. - Continue coumadin, INR- 2.3 on 8/14, lovenox had been stopped. - pharmacy assisting with dosing. INR 2.3->2.0 ( 8/15)->3.1(8/16) >3.2->2.2(8/18)->2.3->1.7( 8/20). Will bridge with Lovenox as high risk for DVT. - conitnue to monitor closely  -8/21 INR therapeutic, dc Lovenox; 8/22 Pt PEND; ? CHANGE TO M,W,F  -8/23 INR 2.3 therapeutic on home dose of Coumadin 10mg daily      Spinal stenosis -  Begun on prednisone PTA for a few weeks. Prednisone tapered, discontinued.       HTN -  Controlled on bystolic. BP in good range.       Bowel mgmt - on scheduled senokot , prn dulcolax, MOM      GI prophylaxis - on pepcid bid      Pain mgmt- advance to oxycodone 5-15mg q4prn, flexeril prn, yesterday received total 45mg of oxycodone. Fair control. Continue to monitor, assess pain level. -  No new pain source. No increase in pain. Takes about 3 tabs roxicodone prn.       Insomnia - prn trazodone      Acute blood loss anemia - Hgb - 11.8 > 12.2 on 8/13. Monitor.   - macrocytic anemia.              Time spent was 25 minutes with over 1/2 in direct patient care/examination, consultation and coordination of care.      Signed By: Khai Kern MD     August 23, 2017

## 2017-08-23 NOTE — PROGRESS NOTES
Problem: Falls - Risk of  Goal: *Absence of Falls  Document Erick Fall Risk and appropriate interventions in the flowsheet.    Outcome: Progressing Towards Goal  Fall Risk Interventions:  Mobility Interventions: Patient to call before getting OOB, Utilize walker, cane, or other assitive device     Mentation Interventions: Door open when patient unattended, Eyeglasses and hearing aids     Medication Interventions: Patient to call before getting OOB, Teach patient to arise slowly     Elimination Interventions: Call light in reach, Toileting schedule/hourly rounds     History of Falls Interventions: Door open when patient unattended

## 2017-08-23 NOTE — PROGRESS NOTES
PHYSICAL THERAPY DAILY NOTE  Time In: 1311  Time Out: 1680  Patient Seen For: PM;Gait training;Patient education; Therapeutic exercise;Transfer training; Wheelchair mobility; Other (see progress notes)    Subjective: patient reporting he just finished lunch and just had pain medication. Reports no increase in low back pain during LE exercises. Reports less pain when walking         Objective:Vital Signs:  Patient Vitals for the past 12 hrs:   Temp Pulse Resp BP SpO2   08/23/17 0801 99.4 °F (37.4 °C) 86 18 122/79 91 %     Pain level:2 to 7 out of 10  Pain location:low back  Pain interventions:Pain medication, rest, positioning,body mechanics    Patient education:Bed mobility training,transfer training, gait training, fall precautions, activity pacing, spinal precautions, w/c mobility and parts management, body mechanics, Patient verbalizing understanding and demonstrating partial understanding of patient education. Recommend follow up education. Interdisciplinary Communication:RN issued patient pain medication at beginning of treatment    Spinal (back brace on when out of bed, fall precautions)  GROSS ASSESSMENT Daily Assessment     Able to don/doff back brace independently       BED/MAT MOBILITY Daily Assessment   Demonstrating correct log roll and supine<>sidelying<>sit.  Improved ability to manage RLE onto and off mat Rolling Right : 6 (Modified independent)  Rolling Left : 6 (Modified independent)  Supine to Sit : 5 (Supervision)  Sit to Supine : 5 (Supervision)       TRANSFERS Daily Assessment    Transfer Type: SPT with walker  Transfer Assistance : 5 (Stand-by assistance)  Sit to Stand Assistance: Stand-by assistance  Car Transfers: Not tested       GAIT Daily Assessment    Amount of Assistance: 5 (Stand-by assistance)  Distance (ft): 100 Feet (ft)  Assistive Device: Walker, rolling;Orthotic device (LSO, [place] Toe UP device)   Gait training with verbal cues for posture correction and to control step length, width of base of support and gait speed    STEPS or STAIRS Daily Assessment    Steps/Stairs Ambulated (#): 0  Level of Assist : 0 (Not tested)       BALANCE Daily Assessment    Sitting - Static: Good (unsupported)  Sitting - Dynamic: Good (unsupported)  Standing - Static: Fair (with RW)  Standing - Dynamic : Impaired       WHEELCHAIR MOBILITY Daily Assessment    Able to Propel (ft): 150 feet  Functional Level: 4  Curbs/Ramps Assist Required (FIM Score): 0 (Not tested)  Wheelchair Setup Assist Required : 4 (Minimal assistance)  Wheelchair Management: Manages left brake;Manages right brake       LOWER EXTREMITY EXERCISES Daily Assessment    Extremity: Both  Exercise Type #1: Supine lower extremity strengthening  Sets Performed: 1  Reps Performed: 20  Level of Assist: Minimal assistance (with RLE)          Assessment: Body mechanics with bed mobility improving. Gait distance slowly improving without compromising posture or gait pattern       Patient returned to room at end of treatment. Patient supine in bed with head of bed elevated and bed rails up x 2. Needs placed in reach of patient. Plan of Care: Continue with POC and progress as tolerated.      Nomi Choi, PT  8/23/2017

## 2017-08-23 NOTE — PROGRESS NOTES
Pt laying in bed watching TV, denies any discomfort or needing anything at this time. Urinal emptied.  Hourly rounding completed throughout shift

## 2017-08-24 ENCOUNTER — HOME HEALTH ADMISSION (OUTPATIENT)
Dept: HOME HEALTH SERVICES | Facility: HOME HEALTH | Age: 62
End: 2017-08-24
Payer: COMMERCIAL

## 2017-08-24 PROCEDURE — 97110 THERAPEUTIC EXERCISES: CPT

## 2017-08-24 PROCEDURE — 97112 NEUROMUSCULAR REEDUCATION: CPT

## 2017-08-24 PROCEDURE — 74011250637 HC RX REV CODE- 250/637: Performed by: PHYSICAL MEDICINE & REHABILITATION

## 2017-08-24 PROCEDURE — 65310000000 HC RM PRIVATE REHAB

## 2017-08-24 PROCEDURE — 99232 SBSQ HOSP IP/OBS MODERATE 35: CPT | Performed by: PHYSICAL MEDICINE & REHABILITATION

## 2017-08-24 PROCEDURE — 97530 THERAPEUTIC ACTIVITIES: CPT

## 2017-08-24 PROCEDURE — 97116 GAIT TRAINING THERAPY: CPT

## 2017-08-24 PROCEDURE — 97535 SELF CARE MNGMENT TRAINING: CPT

## 2017-08-24 RX ADMIN — STANDARDIZED SENNA CONCENTRATE AND DOCUSATE SODIUM 2 TABLET: 8.6; 5 TABLET, FILM COATED ORAL at 17:02

## 2017-08-24 RX ADMIN — OXYCODONE HYDROCHLORIDE 15 MG: 5 TABLET ORAL at 22:18

## 2017-08-24 RX ADMIN — FAMOTIDINE 20 MG: 20 TABLET ORAL at 17:02

## 2017-08-24 RX ADMIN — TRAZODONE HYDROCHLORIDE 50 MG: 50 TABLET ORAL at 22:18

## 2017-08-24 RX ADMIN — WARFARIN SODIUM 10 MG: 10 TABLET ORAL at 20:48

## 2017-08-24 RX ADMIN — NEBIVOLOL HYDROCHLORIDE 5 MG: 5 TABLET ORAL at 08:03

## 2017-08-24 RX ADMIN — FAMOTIDINE 20 MG: 20 TABLET ORAL at 08:03

## 2017-08-24 RX ADMIN — CYCLOBENZAPRINE HYDROCHLORIDE 5 MG: 10 TABLET, FILM COATED ORAL at 07:40

## 2017-08-24 RX ADMIN — OXYCODONE HYDROCHLORIDE 15 MG: 5 TABLET ORAL at 15:23

## 2017-08-24 RX ADMIN — OXYCODONE HYDROCHLORIDE 15 MG: 5 TABLET ORAL at 08:05

## 2017-08-24 RX ADMIN — STANDARDIZED SENNA CONCENTRATE AND DOCUSATE SODIUM 2 TABLET: 8.6; 5 TABLET, FILM COATED ORAL at 08:03

## 2017-08-24 NOTE — PROGRESS NOTES
Problem: Nutrition Deficit  Goal: *Optimize nutritional status  Nutrition LOS Note:   Assessment  Diet order(s): Regular  Food,Nutrition, and Pertinent History: Patient presents with no acute nutrition risk factors identified by malnutrition screening tool upon admission. The patient with a h/o partial lobectomy, bronchiectasis and spinal stenosis. The patient has a good appetite. The patient has been consuming nearly 100% of his meals. Anthropometrics:   Height 6' 1\",  weight 108.9 kg, 240# (weight source: bed), BMI 31.7  BMI class of obesity class I. Macronutrient Needs:  · EER:  6758-8951 kcal /day (15-20 kcal/kg actual BW)  · EPR:   grams protein/day (1-1.2 grams/kg IBW)  Intake/Comparative Standards: PAverage intake for past 12 day(s)/22 recorded meal(s): 90%. This potentially meets ~100% of kcal and ~100% of protein needs     Nutrition Diagnosis: No nutrition diagnosis at this time     Intervention:   Meals and snacks: Continue current diet. Nutrition Discharge Plan: no nutrition needs identified at this time. Abdulaziz Roche [address], [address],    [phone]

## 2017-08-24 NOTE — PROGRESS NOTES
Warfarin dosing per pharmacist    Maritza Cabello is a 63 y.o. male. Indication:  Hx recurrent DVTs and PE    Goal INR:  2.0 - 3.0    Home dose:  10 mg qhs    Risk factors or significant drug interactions:  none    Other anticoagulants:      Daily Monitoring  Date  INR     Warfarin dose  HGB              Notes  8/9  1.0  10 mg   14.3  8/10  1.0  10 mg   12.7  8/11  1.4  10 mg   11.8  8/12  1.4  10 mg   ---  8/13  1.9  10 mg   12.2  8/14  2.3  10 mg    ---  8/15  2.0  2 mg + 10 mg  ---  8/16  3.1  5 mg   ---  8/17  3.2  5 mg   12.2  8/18  2.2  5 mg + 5 mg  ---  8/19  2.3  10 mg   ---  8/20  1.7  10 mg   ---  8/21  2.1  10 mg   ---  8/22  2.2  10 mg   ---  8/23  2.3  10 mg   ---  8/24  ---  10 mg   ---        Pharmacy consulted to assist with warfarin dosing on 8/13. His warfarin was being held for surgery on 8/8 and was resumed on 8/9. INR remains within therapeutic range at 2.3 today, so will continue dosing at home dose of 10 mg every night. As INR appears stable and within range, will check INR on [facility] schedule moving forward. Will also plan to check hemoglobin level on Friday as it has not been evaluated in a week. Will continue to follow.      Thank you,  Poli Larsen, PharmD  Clinical Pharmacist  [phone]

## 2017-08-24 NOTE — PROGRESS NOTES
Subjective \"My left shoulder still limits me with my movements. \"   Activity UE exercises with weighted bar   Strength/Endurance Patient was limited in the use of his LUE due to his shoulder pain when he raised his arm above shoulder height. Balance Patient was CGA with SPT from his w/c to bed. Social Interaction Patient was friendly and sociable during the session. He initiated conversation and enjoyed sharing about his girlfriend and her kids. Cognitive A&O X4   Comments Patient was assisted to his room and left seated up in his w/c with all needs within reach.      Ro Rivas, CTRS

## 2017-08-24 NOTE — PROGRESS NOTES
PHYSICAL THERAPY DAILY NOTE  Time In: 1213  Time Out: 3424  Patient Seen For: PM;Therapeutic exercise; Other (see progress notes);Gait training;Transfer training    Subjective: Patient had no complaints. Objective:   Spinal (back brace on when out of bed, fall precautions)  GROSS ASSESSMENT Daily Assessment            BED/MAT MOBILITY Daily Assessment    Supine to Sit : 5 (Supervision)  Sit to Supine : 5 (Supervision)       TRANSFERS Daily Assessment    Transfer Type: SPT with walker  Transfer Assistance : 5 (Stand-by assistance)  Sit to Stand Assistance: Stand-by assistance       GAIT Daily Assessment    Amount of Assistance: 4 (Contact guard assistance)  Distance (ft): 80 Feet (ft)  Assistive Device: Walker, rolling;Gait belt;Brace/Splint (dept toe up brace)       STEPS or STAIRS Daily Assessment    Level of Assist : 0 (Not tested)       BALANCE Daily Assessment            WHEELCHAIR MOBILITY Daily Assessment            LOWER EXTREMITY EXERCISES Daily Assessment    Extremity: Both  Exercise Type #1: Other (comment) (standing exs in III bars)  Sets Performed: 1  Reps Performed: 10  Level of Assist: Stand-by assistance          Assessment: Patient making good progress. Plan of Care: Continue with plan of care to reach PT goals.  Returned to room with call boles at [facility]    Brooks Arm, PTA  8/24/2017

## 2017-08-24 NOTE — PROGRESS NOTES
OT Daily Note  Time In 1345   Time Out 1430     Pain: Patient had no complaint of pain. Functional Mobility   Pt walked to gym with SBA with RW and toe-up. Pt was mod I for supine to sit. Self-Care   Pt donned shoes and back brace with set-up. Neuro-Muscular Re-Education   Pt completed approximately 30 alternating lunges and mini-squats to improve balance and LB strength for functional mobility. Pt commented squats were more difficult. Pt completed 10 upward punches B, 20 anterior circles in each direction with 3 lb dowel, and 15 anterior punches with 3 lb dowel to improve B shoulder strength and ROM. LUE demonstrates deficits still, but is improving. Pt kicked a ball 2x20 with RLE. Pt's second set demonstrated improvement when he paid attention to lifting toes. Pt stood for approximately 10 minutes while engaging in one handed activity. Education   Discharge     Interdisciplinary Communication: Team conference    Plan: Continue with POC. Pt was left awaiting PTA Corn[place].      Belen Morrison OTR/L  8/24/2017

## 2017-08-24 NOTE — PROGRESS NOTES
Time In 0700   Time Out 0738     Activities of Daily Living    Score Comments   Self-Feeding 7 Indpendent   Bathing 6 Body Parts Bathe: Abdomen, Arm, left, Arm, right, Buttocks, Chest, Lower leg and foot, left, Lower leg and foot, right, Josselin area, Thigh, left, Thigh, right  Comments: LHS   Tub/Shower Transfer [place] 6 Type of Shower: Shower  Adaptive  Equipment:Tub transfer bench, Grab bars and Walker  Comments: RW   Upper Body  Dressing/  Undressing 7 Items Applied:Pullover (4 steps)  Comments: I   Lower Body Dressing/  Undressing 6 Items Applied:Shoe, left (1 step), Shoe, right (1 step), Sock, left (1 step), Sock, right (1 step), Underpants (3 steps), Elastic waist pants (3 steps)  Adaptive Equipment: Elastic shoelaces, RW, reacher, sock aide, and foot funnel  Comments: RW   Education  Spinal precautions     Pt was in bed and agreeable to tx. Pt's performance with ADL is reflected in above chart. Pt was left in recliner with all needs within reach. Pt had some back spasms that DEMETRIO Foster was notified of. Discussed d/c with KATH Wilson. Continue with POC.      Clara Rahman OTR/L  8/24/2017

## 2017-08-24 NOTE — PROGRESS NOTES
Team conference; discharge scheduled for 8/30 to home with [place] PT and OT. Family training recommended. Pt will go to girlfriend's home at discharge. Discussed with pt; agreeable. He will discuss dc with GF. Prefers [facility]. Referral made. Clinical update [facility]. with C requesting additional rehab days.

## 2017-08-24 NOTE — PROGRESS NOTES
Michael Ellis MD,   Medical Director  [address] [facility], [address] [facility]  Tel: [phone]       [facility] PROGRESS NOTE    Siddharth Gonzalez  Admit Date: 8/12/2017  Admit Diagnosis: Post Spinal Surgery Rehab;Spinal stenosis of lumbar region    Subjective     No problems or complaints. Pain midline inc 3/10. No pain at rest. No radicular symptoms. Progressing in therapies. Objective:     Current Facility-Administered Medications   Medication Dose Route Frequency    warfarin (COUMADIN) tablet 10 mg  10 mg Oral QHS    oxyCODONE IR (ROXICODONE) tablet 5-15 mg  5-15 mg Oral Q4H PRN    traZODone (DESYREL) tablet 50 mg  50 mg Oral QHS PRN    senna-docusate (PERICOLACE) 8.6-50 mg per tablet 2 Tab  2 Tab Oral BID    bisacodyl (DULCOLAX) suppository 10 mg  10 mg Rectal DAILY PRN    bisacodyl (DULCOLAX) tablet 5 mg  5 mg Oral DAILY PRN    cyclobenzaprine (FLEXERIL) tablet 5 mg  5 mg Oral TID PRN    magnesium hydroxide (MILK OF MAGNESIA) 400 mg/5 mL oral suspension 30 mL  30 mL Oral DAILY PRN    alum-mag hydroxide-simeth (MYLANTA) oral suspension 30 mL  30 mL Oral Q4H PRN    testosterone enanthate (DELATESTRYL) injection 200 mg  200 mg IntraMUSCular Q 14 DAYS    famotidine (PEPCID) tablet 20 mg  20 mg Oral BID    ondansetron (ZOFRAN ODT) tablet 4 mg  4 mg Oral Q6H PRN    nebivolol (BYSTOLIC) tablet 5 mg  5 mg Oral DAILY     Review of Systems:Denies chest pain, shortness of breath, cough, headache, visual problems, abdominal pain, dysurea, calf pain. Pertinent positives are as noted in the medical records and unremarkable otherwise. Visit Vitals    /79    Pulse 69    Temp 98.8 °F (37.1 °C)    Resp 17    SpO2 97%        Physical Exam:   General: Alert and age appropriately oriented. No acute cardio respiratory distress.    HEENT: Normocephalic,no scleral icterus  Oral mucosa moist without cyanosis   Lungs: Clear to auscultation bilaterally. Respiration even and unlabored   Heart: Regular rate and rhythm, S1, S2   No  murmurs, clicks, rub or gallops   Abdomen: Soft, non-tender, nondistended. Bowel sounds present. No organomegaly. Genitourinary: Benign . Neuromuscular:      Mild hip flexion weakness, still limited DF on the right 3-/5. No sensory deficits. No clonus   Skin/extremity: No rashes, no erythema. No calf tenderness BLE  Wound with steri strips, dry, no erythema                                                                            Functional Assessment:  Gross Assessment  AROM: Generally decreased, functional (08/21/17 1000)  Strength: Generally decreased, functional (R Ankle DF- 3/5) (08/21/17 1000)  Sensation: Impaired (08/18/17 1500)       Balance  Sitting - Static: Good (unsupported) (08/23/17 1500)  Sitting - Dynamic: Good (unsupported) (08/23/17 1500)  Standing - Static: Fair (with RW) (08/23/17 1500)  Standing - Dynamic : Impaired (08/23/17 1500)           Toileting  Cues: Physical assistance for pants down (08/16/17 1021)         [facility]  [facility] Fall Risk  Mobility: Ambulates or transfers with assist devices or assistance/unsteady gait (08/24/17 0729)  Mobility Interventions: Patient to call before getting OOB (08/24/17 0729)  Mentation: Alert, oriented x 3 (08/24/17 0729)  Mentation Interventions: Door open when patient unattended (08/24/17 0729)  Medication: Patient receiving anticonvulsants, sedatives(tranquilizers), psychotropics or hypnotics, hypoglycemics, narcotics, sleep aids, antihypertensives, laxatives, or diuretics (08/24/17 0729)  Medication Interventions: Patient to call before getting OOB (08/24/17 0729)  Elimination: Needs assistance with toileting (08/24/17 0729)  Elimination Interventions: Call light in reach; Patient to call for help with toileting needs; Toileting schedule/hourly rounds (08/24/17 0729)  Prior Fall History: No (08/24/17 0729)  History of Falls Interventions: Door open when patient unattended (08/22/17 0800)  Total Score: 3 (08/24/17 0729)  Standard Fall Precautions: Yes (08/24/17 0729)  High Fall Risk: Yes (08/17/17 0676)     Speech Assessment:         Ambulation:  Gait  Speed/Lillie: Pace decreased (<100 feet/min); Slow (08/21/17 1000)  Step Length: Left shortened;Right shortened (08/21/17 1000)  Swing Pattern: Right asymmetrical (08/21/17 1000)  Gait Abnormalities: Decreased step clearance;Shuffling gait (R LE narrowed foot placement at IC) (08/21/17 1000)  Distance (ft): 100 Feet (ft) (08/23/17 1500)  Assistive Device: Lubertha Evener, rolling;Orthotic device (LSO, Ossur Toe UP device) (08/23/17 1500)  Rail Use: Both (08/21/17 1000)     Labs/Studies:  Recent Results (from the past 72 hour(s))   PROTHROMBIN TIME + INR    Collection Time: 08/22/17  7:06 AM   Result Value Ref Range    Prothrombin time 24.3 (H) 9.6 - 12.0 sec    INR 2.2 (H) 0.9 - 1.2     PROTHROMBIN TIME + INR    Collection Time: 08/23/17  6:46 AM   Result Value Ref Range    Prothrombin time 25.0 (H) 9.6 - 12.0 sec    INR 2.3 (H) 0.9 - 1.2         Assessment:     Problem List as of 8/24/2017  Never Reviewed          Codes Class Noted - Resolved    Personal history of DVT (deep vein thrombosis) ICD-10-CM: Q91.220  ICD-9-CM: V12.51  8/21/2017 - Present        * (Principal)Spinal stenosis of lumbar region ICD-10-CM: M48.06  ICD-9-CM: 724.02  8/8/2017 - Present        Spondylolisthesis of lumbar region ICD-10-CM: M43.16  ICD-9-CM: 738.4  8/8/2017 - Present        Spinal stenosis ICD-10-CM: M48.00  ICD-9-CM: 724.00  8/8/2017 - Present        Gastro - esophageal reflux disease (Chronic) ICD-9-CM: 530.81  12/19/2009 - Present        Cellulitis and abscess of trunk ICD-10-CM: L03.319, L02.219  ICD-9-CM: 682.2  12/19/2009 - Present    Overview Signed 12/19/2009 10:48 AM by Karis Mabry. Secondary to infected right thoracotomy incision.              Hydropneumothorax ICD-10-CM: J94.8  ICD-9-CM: 511.89 12/19/2009 - Present        Anemia ICD-10-CM: D64.9  ICD-9-CM: 285.9  12/10/2009 - Present        Mycetoma (Chronic) ICD-10-CM: B47.9  ICD-9-CM: 039.9  12/1/2009 - Present        Status Post Partial Lobectomy of Lung-mycetoma ICD-10-CM: Z90.2  ICD-9-CM: V45.89  12/1/2009 - Present        [facility] (Chronic) ICD-10-CM: J47.9  ICD-9-CM: 494.0  12/1/2009 - Present        RESOLVED: Fever ICD-10-CM: R50.9  ICD-9-CM: 780.60  12/10/2009 - 12/11/2009        RESOLVED: Renal failure ICD-10-CM: N19  ICD-9-CM: 760  12/10/2009 - 12/14/2009        RESOLVED: Hyponatremia ICD-10-CM: E87.1  ICD-9-CM: 276.1  12/10/2009 - 12/14/2009        RESOLVED: Hypotension ICD-10-CM: I95.9  ICD-9-CM: 458.9  12/10/2009 - 12/11/2009        RESOLVED: Systemic inflammatory response syndrome due to non-infectious process with acute organ dysfunction ([facility] ICD-10-CM: R65.11  ICD-9-CM: 995.94  12/10/2009 - 12/14/2009        RESOLVED: Hypoxemia ICD-10-CM: R09.02  ICD-9-CM: 799.02  12/1/2009 - 12/20/2009              Plan: This is a 62 YO with 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       Rehabilitation Plan  Continue PT for a minimum of 1.5 hours a day, at least 5 out of 7 days per week to address bed mobility, transfers, ambulation, strengthening, balance, and endurance. The patient to wear back brace when OOB. Patient to continuee to focus on L ankle dorsiflexion strength. Continue OT for a minimum of 1.5 hours a day, at least 5 out of 7 days per week to address ADL (bathing, LE dressing, toileting) safety; energy conservations and adaptive equipment as needed.  -8/22 stress spinal precautions!! Cont brace when oob    L foot drop; activates, improving.  Hopefully no AFO needed but with use of one, all deficits resolve but want to keep working on active DF thus, continue ACE dorsiflex assist; e stim          Hx of DVT/ PE, coagulopathy - Anticoagulation management - on long term coumadin secondary to recurrent DVTs/PE. - Continue coumadin, INR- 2.3 on 8/14, lovenox had been stopped. - pharmacy assisting with dosing. INR 2.3->2.0 ( 8/15)->3.1(8/16) >3.2->2.2(8/18)->2.3->1.7( 8/20). Will bridge with Lovenox as high risk for DVT. - conitnue to monitor closely  -8/21 INR therapeutic, dc Lovenox; 8/22 Pt PEND; ? CHANGE TO M,W,F  -8/23 INR 2.3 therapeutic on home dose of Coumadin 10mg daily  -8/23 INR 2.4 therapeutic; change to M. W F      Spinal stenosis -  Begun on prednisone PTA for a few weeks. Prednisone tapered, discontinued.       HTN -  Controlled on bystolic. BP in good range.       Bowel mgmt - on scheduled senokot , prn dulcolax, MOM      GI prophylaxis - on pepcid bid      Pain mgmt- advance to oxycodone 5-15mg q4prn, flexeril prn, yesterday received total 45mg of oxycodone. Fair control. Continue to monitor, assess pain level. -  No new pain source. No increase in pain. Takes about 3 tabs roxicodone prn.       Insomnia - prn trazodone      Acute blood loss anemia - Hgb - 11.8 > 12.2 on 8/13. Monitor.   - macrocytic anemia.         Time spent was 25 minutes with over 1/2 in direct patient care/examination, consultation and coordination of care.      Signed By: Petrona Main MD     August 24, 2017

## 2017-08-24 NOTE — PROGRESS NOTES
PHYSICAL THERAPY DAILY NOTE  Time In: 6772  Time Out: 7839  Patient Seen For: AM;Transfer training;Gait training; Therapeutic exercise; Other (see progress notes)    Subjective: Patient had no complaints. Objective: No pain noted but did voice that he had spasms earlier and did get medicine for them prior to therapy. Patient can recall back precautions but doesn't follow so recommended him to yolande back brace when up to the bathroom. Spinal (back brace on when out of bed, fall precautions)  GROSS ASSESSMENT Daily Assessment            BED/MAT MOBILITY Daily Assessment    Supine to Sit : 5 (Supervision)  Sit to Supine : 5 (Supervision)       TRANSFERS Daily Assessment    Transfer Type: SPT with walker  Transfer Assistance : 5 (Stand-by assistance)  Sit to Stand Assistance: Stand-by assistance       GAIT Daily Assessment    Amount of Assistance: 5 (Stand-by assistance)  Distance (ft): 80 Feet (ft)  Assistive Device: Walker, rolling;Gait belt;Brace/Splint (back brace)       STEPS or STAIRS Daily Assessment    Level of Assist : 0 (Not tested)       BALANCE Daily Assessment            WHEELCHAIR MOBILITY Daily Assessment            LOWER EXTREMITY EXERCISES Daily Assessment    Extremity: Both  Exercise Type #1: Other (comment) (gait exs in II bars using mirror to improve posture)  Sets Performed: 2  Reps Performed: 5  Level of Assist: Stand-by assistance          Assessment: Patient making good progress but balance in standing and gait still at risk for falling. Right leg weak in the hip. Plan of Care: Continue with plan of care to reach PT goals.      Augustus Brooks, PTA  8/24/2017

## 2017-08-25 LAB
ERYTHROCYTE [DISTWIDTH] IN BLOOD BY AUTOMATED COUNT: 13.4 % (ref 11.9–14.6)
HCT VFR BLD AUTO: 41.2 % (ref 41.1–50.3)
HGB BLD-MCNC: 12.6 G/DL (ref 13.6–17.2)
HGB BLD-MCNC: 13.9 G/DL (ref 13.6–17.2)
INR PPP: 2.7 (ref 0.9–1.2)
MCH RBC QN AUTO: 33.3 PG (ref 26.1–32.9)
MCHC RBC AUTO-ENTMCNC: 33.7 G/DL (ref 31.4–35)
MCV RBC AUTO: 98.6 FL (ref 79.6–97.8)
PLATELET # BLD AUTO: 191 K/UL (ref 150–450)
PMV BLD AUTO: 9.1 FL (ref 10.8–14.1)
PROTHROMBIN TIME: 30.1 SEC (ref 9.6–12)
RBC # BLD AUTO: 4.18 M/UL (ref 4.23–5.67)
WBC # BLD AUTO: 5.1 K/UL (ref 4.3–11.1)

## 2017-08-25 PROCEDURE — 97110 THERAPEUTIC EXERCISES: CPT

## 2017-08-25 PROCEDURE — 97535 SELF CARE MNGMENT TRAINING: CPT

## 2017-08-25 PROCEDURE — 85027 COMPLETE CBC AUTOMATED: CPT | Performed by: PHYSICAL MEDICINE & REHABILITATION

## 2017-08-25 PROCEDURE — 83090 ASSAY OF HOMOCYSTEINE: CPT | Performed by: PHYSICAL MEDICINE & REHABILITATION

## 2017-08-25 PROCEDURE — 74011250637 HC RX REV CODE- 250/637: Performed by: PHYSICAL MEDICINE & REHABILITATION

## 2017-08-25 PROCEDURE — 85306 CLOT INHIBIT PROT S FREE: CPT | Performed by: PHYSICAL MEDICINE & REHABILITATION

## 2017-08-25 PROCEDURE — 85610 PROTHROMBIN TIME: CPT | Performed by: PHYSICAL MEDICINE & REHABILITATION

## 2017-08-25 PROCEDURE — 97530 THERAPEUTIC ACTIVITIES: CPT

## 2017-08-25 PROCEDURE — 36415 COLL VENOUS BLD VENIPUNCTURE: CPT | Performed by: PHYSICAL MEDICINE & REHABILITATION

## 2017-08-25 PROCEDURE — 85240 CLOT FACTOR VIII AHG 1 STAGE: CPT | Performed by: PHYSICAL MEDICINE & REHABILITATION

## 2017-08-25 PROCEDURE — 85302 CLOT INHIBIT PROT C ANTIGEN: CPT | Performed by: PHYSICAL MEDICINE & REHABILITATION

## 2017-08-25 PROCEDURE — 97116 GAIT TRAINING THERAPY: CPT

## 2017-08-25 PROCEDURE — 99232 SBSQ HOSP IP/OBS MODERATE 35: CPT | Performed by: PHYSICAL MEDICINE & REHABILITATION

## 2017-08-25 PROCEDURE — 85018 HEMOGLOBIN: CPT | Performed by: PHYSICAL MEDICINE & REHABILITATION

## 2017-08-25 PROCEDURE — 65310000000 HC RM PRIVATE REHAB

## 2017-08-25 PROCEDURE — 97112 NEUROMUSCULAR REEDUCATION: CPT

## 2017-08-25 PROCEDURE — 81241 F5 GENE: CPT | Performed by: PHYSICAL MEDICINE & REHABILITATION

## 2017-08-25 RX ADMIN — TRAZODONE HYDROCHLORIDE 50 MG: 50 TABLET ORAL at 22:07

## 2017-08-25 RX ADMIN — OXYCODONE HYDROCHLORIDE 15 MG: 5 TABLET ORAL at 08:18

## 2017-08-25 RX ADMIN — STANDARDIZED SENNA CONCENTRATE AND DOCUSATE SODIUM 2 TABLET: 8.6; 5 TABLET, FILM COATED ORAL at 08:06

## 2017-08-25 RX ADMIN — STANDARDIZED SENNA CONCENTRATE AND DOCUSATE SODIUM 2 TABLET: 8.6; 5 TABLET, FILM COATED ORAL at 17:02

## 2017-08-25 RX ADMIN — OXYCODONE HYDROCHLORIDE 15 MG: 5 TABLET ORAL at 15:07

## 2017-08-25 RX ADMIN — FAMOTIDINE 20 MG: 20 TABLET ORAL at 17:02

## 2017-08-25 RX ADMIN — OXYCODONE HYDROCHLORIDE 15 MG: 5 TABLET ORAL at 22:06

## 2017-08-25 RX ADMIN — FAMOTIDINE 20 MG: 20 TABLET ORAL at 08:06

## 2017-08-25 RX ADMIN — WARFARIN SODIUM 10 MG: 10 TABLET ORAL at 22:07

## 2017-08-25 RX ADMIN — NEBIVOLOL HYDROCHLORIDE 5 MG: 5 TABLET ORAL at 08:06

## 2017-08-25 NOTE — PROGRESS NOTES
Subjective \"Thank you for that cup of coffee. It was so good especially since I haven't had a good cup since I got here. \"   Activity UE exercises with 1 lb wrist weights   Strength/Endurance Patient tolerated the session with no c/o tiredness. He was limited with the movement in his LUE but still able to participate. Balance NT   Social Interaction Patient was friendly and motivated during the session. Cognitive A&O X4   Comments Patient was handed off to Jamel Goodell, PT at the end of the session.      Gely Gandara, APRILS

## 2017-08-25 NOTE — PROGRESS NOTES
Time In 0744   Time Out 0822     Activities of Daily Living    Score Comments   Grooming 7 Tasks completed by patient: Brushed teeth, Shaved/applied makeup (optional)  Comments: I in sitting   Bathing 6 Body Parts Bathe: Abdomen, Arm, left, Arm, right, Chest, Buttocks, Lower leg and foot, left, Lower leg and foot, right, Josselin area, Thigh, left, Thigh, right  Comments: LHS   Tub/Shower Transfer [place] 6 Type of Shower: Shower  Adaptive  Equipment:Tub transfer bench, Grab bars and Wheelchair  Comments: RW   Upper Body  Dressing/  Undressing 6 Items Applied:Pullover (4 steps)  Comments: Reacher   Lower Body Dressing/  Undressing 6 Items Applied:Shoe, left (1 step), Shoe, right (1 step), Sock, left (1 step), Sock, right (1 step), Elastic waist pants (3 steps)  Adaptive Equipment: Sock aide, reacher, foot funnel, elastic shoelace, and RW  Comments: AE   Education  Spinal precautions     Pt was in bed and agreeable to tx. Pt's performance with ADL is reflected in above chart. Pt was left in w/c with all needs within reach. Collaborated with DEMETRIO Bay and [employer] on pt needs. Continue with POC.      Garry Damon OTR/STANLEY  8/25/2017

## 2017-08-25 NOTE — PROGRESS NOTES
Warfarin dosing per pharmacist    Triston Ardon is a 63 y.o. male. Indication:  Hx recurrent DVTs and PE    Goal INR:  2.0 - 3.0    Home dose:  10 mg qhs    Risk factors or significant drug interactions:  none    Other anticoagulants:      Daily Monitoring  Date  INR     Warfarin dose  HGB              Notes  8/9  1.0  10 mg   14.3  8/10  1.0  10 mg   12.7  8/11  1.4  10 mg   11.8  8/12  1.4  10 mg   ---  8/13  1.9  10 mg   12.2  8/14  2.3  10 mg    ---  8/15  2.0  2 mg + 10 mg  ---  8/16  3.1  5 mg   ---  8/17  3.2  5 mg   12.2  8/18  2.2  5 mg + 5 mg  ---  8/19  2.3  10 mg   ---  8/20  1.7  10 mg   ---  8/21  2.1  10 mg   ---  8/22  2.2  10 mg   ---  8/23  2.3  10 mg   ---  8/24  ---  10 mg   ---  8/25  2.7  10 mg   13.9       Pharmacy consulted to assist with warfarin dosing on 8/13. His warfarin was being held for surgery on 8/8 and was resumed on 8/9. INR trending up and now at 2.7. Will continue with 10 mg every evening for now. To be cautious as INR is trending up, will draw INR daily moving forward. Will continue to follow.      Thank you,  Maikol Andre, PharmD  Clinical Pharmacist  [phone]

## 2017-08-25 NOTE — PROGRESS NOTES
PHYSICAL THERAPY DAILY NOTE  Time In: 1811  Time Out: 1100  Patient Seen For: AM;Balance activities;Gait training;Patient education; Therapeutic exercise;Transfer training; Wheelchair mobility; Other (see progress notes)    Subjective: Patient reporting he feels OK. Reports he has had pain medication. Reports no increase in back pain during treatment. Reports no muscle spasms. Reports he could feel his low back muscle on the right side \"tense up\" when attempting to maintain standing balance on just his Right leg         Objective:Vital Signs:  Patient Vitals for the past 12 hrs:   Temp Pulse Resp BP SpO2   08/25/17 0813 98.4 °F (36.9 °C) 74 16 117/78 95 %     Pain level:0 to 4 out of 10  Pain location:low back  Pain interventions:Pain medication, rest, positioning,body mechanics    Patient education:Balance training,transfer training, gait training, fall precautions, activity pacing, spinal precautions, w/c mobility and parts management, body mechanics, Patient verbalizing understanding and demonstrating partial understanding of patient education. Recommend follow up education.     Interdisciplinary Communication:spoke with MD regarding improvement in gait pattern and posture    Spinal (back brace on when out of bed, fall precautions)  GROSS ASSESSMENT Daily Assessment     independent with donning/doffing LSO       BED/MAT MOBILITY Daily Assessment    Rolling Right : 0 (Not tested)  Rolling Left : 0 (Not tested)  Supine to Sit : 0 (Not tested)  Sit to Supine : 0 (Not tested)       TRANSFERS Daily Assessment   Demonstrating improved body mechanics with sit<>stand Transfer Type: SPT with walker  Transfer Assistance : 5 (Stand-by assistance)  Sit to Stand Assistance: Supervision  Car Transfers: Not tested       GAIT Daily Assessment    Amount of Assistance: 5 (Stand-by assistance) verbal cues for posture correction, RW placement and to control step length  Distance (ft): 100 Feet (ft)  Assistive Device: Aracelis Sky rolling;Orthotic device (LSO, ossur foot up device)   Gait training x 60ft x 4 in parallel bars facilitating upright midline posture with improved stance stability and inhibiting trendelenburg during stance phase, facilitating improved width of base of support inhibiting excessive adduction of LLE during swing phase with verbal and visual cues    STEPS or STAIRS Daily Assessment    Steps/Stairs Ambulated (#): 0  Level of Assist : 0 (Not tested)       BALANCE Daily Assessment   Static standing balance activities in parallel  Bars with left foot on 3 inch step facilitating upright posture with hip stability inhibiting trendelenburg on Right stance LE. Alternating single UE support x 10 reps followed 5 5 reps of no UE support. Completed 3 sets with verbal, visual and tactile cues . Attempted RLE single limb stance with single UE support and patient unable to maintain upright midline position Sitting - Static: Good (unsupported)  Sitting - Dynamic: Good (unsupported)  Standing - Static: Fair  Standing - Dynamic : Impaired       WHEELCHAIR MOBILITY Daily Assessment    Able to Propel (ft): 150 feet  Functional Level: 5  Curbs/Ramps Assist Required (FIM Score): 0 (Not tested)  Wheelchair Setup Assist Required : 4 (Minimal assistance)  Wheelchair Management: Manages left brake;Manages right brake       LOWER EXTREMITY EXERCISES Daily Assessment     NA          Assessment: RLE stance phase stability improving. Not able to stabilize right hip in single limb stance without UE support due to hip ABD weakness       Patient return to room at end of treatment and remained up in wheelchair with needs in reach. Plan of Care: Continue with POC and progress as tolerated.      Bria Ahn, PT  8/25/2017

## 2017-08-25 NOTE — PROGRESS NOTES
PHYSICAL THERAPY DAILY NOTE  Time In: 1520  Time Out: 0206  Patient Seen For: AM;Gait training;Patient education; Therapeutic exercise;Transfer training; Wheelchair mobility; Other (see progress notes)    Subjective: patient reporting he feels OK. Reports he was able to rest in the bed for a few hours after lunch. Reports he is trying to take less pain medication. Objective:Vital Signs:  Patient Vitals for the past 12 hrs:   Temp Pulse Resp BP SpO2   08/25/17 0813 98.4 °F (36.9 °C) 74 16 117/78 95 %     Pain level: 0 to 6 out of 10  Pain location:low back  Pain interventions:Pain medication, rest, positioning    Patient education:Bed mobility training,transfer training, gait training, fall precautions, activity pacing, spinal precautions, w/c mobility and parts management, body mechanics, Patient verbalizing understanding and demonstrating partial understanding of patient education. Recommend follow up education.         Interdisciplinary Communication:NA    Spinal (back brace on when out of bed, fall precautions)  GROSS ASSESSMENT Daily Assessment     NA       BED/MAT MOBILITY Daily Assessment   Demonstrating correct body mechanics Rolling Right : 6 (Modified independent)  Rolling Left : 6 (Modified independent)  Supine to Sit : 6 (Modified independent)  Sit to Supine : 6 (Modified independent)       TRANSFERS Daily Assessment    Transfer Type: SPT with walker  Transfer Assistance : 5 (Supervision/setup)  Sit to Stand Assistance: Supervision  Car Transfers: Not tested       GAIT Daily Assessment    NT       STEPS or STAIRS Daily Assessment   Slow single step at a time leading up with LLE and down with RLE< cues for posture and gait seqeunce Steps/Stairs Ambulated (#): 8 (4 steps x 2 )  Level of Assist : 5 (Stand-by assistance)  Rail Use: Both       BALANCE Daily Assessment    Sitting - Static: Good (unsupported)  Sitting - Dynamic: Good (unsupported)  Standing - Static: Fair  Standing - Dynamic : Impaired WHEELCHAIR MOBILITY Daily Assessment    Able to Propel (ft): 150 feet  Functional Level: 5  Curbs/Ramps Assist Required (FIM Score): 0 (Not tested)  Wheelchair Setup Assist Required : 4 (Minimal assistance)  Wheelchair Management: Manages left brake;Manages right brake       LOWER EXTREMITY EXERCISES Daily Assessment    Extremity: Both  Exercise Type #1: Supine lower extremity strengthening  Sets Performed: 2  Reps Performed: 10  Level of Assist: Minimal assistance (with RLE heel slides and hip ABD<>ADD)          Assessment: Progressing towards modified independence with SPT with RW. Improved posture and stance stability ambulating up/down steps       Patient return to room at end of treatment and remained up in wheelchair with needs in reach. Plan of Care: Continue with POC and progress as tolerated.      Star Martinez, PT  8/25/2017

## 2017-08-25 NOTE — PROGRESS NOTES
OT Daily Note  Time In 1116   Time Out 1200     Pain: Patient had no complaint of pain. Functional Mobility   Pt walked to and from the gym with SBA using toe off. Neuro-Muscular Re-Education   Pt completed 1x5 sit to stands from 21'' and 3x5 of sit to stands from 19'' inches. Pt required to push up with hands from 19'', but not 21''. Pt required 3 minute rest breaks between sets. Pt completed 15 minutes on the Nu[employer] on Level 4 and went 0.75 miles. Education   Modifications for furniture at home     Interdisciplinary Communication: DEMETRIO Jay on pain management    Plan: Continue with POC. Pt was left in recliner with all needs within reach.      Rachid Amato OTR/L  8/25/2017

## 2017-08-25 NOTE — PROGRESS NOTES
Problem: Falls - Risk of  Goal: *Absence of Falls  Document Erick Fall Risk and appropriate interventions in the flowsheet.    Outcome: Progressing Towards Goal  Fall Risk Interventions:  Mobility Interventions: Patient to call before getting OOB     Mentation Interventions: Door open when patient unattended, Room close to nurse's station     Medication Interventions: Patient to call before getting OOB     Elimination Interventions: Call light in reach     History of Falls Interventions: Door open when patient unattended

## 2017-08-25 NOTE — PROGRESS NOTES
Maurice Montes MD,   Medical Director  [address] [facility], [address] [facility]  Tel: [phone]       [facility] PROGRESS NOTE    [place] Files  Admit Date: 8/12/2017  Admit Diagnosis: Post Spinal Surgery Rehab;Spinal stenosis of lumbar region    Subjective     Patient seen and examined. Vss. No acute complaints. PT, OT well tolerated. Steady gains made. No new barrier to progress noted. Motivated to improve. Still with some issues with right foot drop and lack of safety awareness at times. Starting to \"get it \" due to constant communication with therapists who are educating him    Objective:     Current Facility-Administered Medications   Medication Dose Route Frequency    warfarin (COUMADIN) tablet 10 mg  10 mg Oral QHS    oxyCODONE IR (ROXICODONE) tablet 5-15 mg  5-15 mg Oral Q4H PRN    traZODone (DESYREL) tablet 50 mg  50 mg Oral QHS PRN    senna-docusate (PERICOLACE) 8.6-50 mg per tablet 2 Tab  2 Tab Oral BID    bisacodyl (DULCOLAX) suppository 10 mg  10 mg Rectal DAILY PRN    bisacodyl (DULCOLAX) tablet 5 mg  5 mg Oral DAILY PRN    cyclobenzaprine (FLEXERIL) tablet 5 mg  5 mg Oral TID PRN    magnesium hydroxide (MILK OF MAGNESIA) 400 mg/5 mL oral suspension 30 mL  30 mL Oral DAILY PRN    alum-mag hydroxide-simeth (MYLANTA) oral suspension 30 mL  30 mL Oral Q4H PRN    testosterone enanthate (DELATESTRYL) injection 200 mg  200 mg IntraMUSCular Q 14 DAYS    famotidine (PEPCID) tablet 20 mg  20 mg Oral BID    ondansetron (ZOFRAN ODT) tablet 4 mg  4 mg Oral Q6H PRN    nebivolol (BYSTOLIC) tablet 5 mg  5 mg Oral DAILY     Review of Systems:Denies chest pain, shortness of breath, cough, headache, visual problems, abdominal pain, dysurea, calf pain. Pertinent positives are as noted in the medical records and unremarkable otherwise.      Visit Vitals    /79    Pulse 79    Temp 97.8 °F (36.6 °C)    Resp 18    SpO2 97%        Physical Exam: General: Alert and age appropriately oriented. No acute cardio respiratory distress. HEENT: Normocephalic,no scleral icterus  Oral mucosa moist without cyanosis   Lungs: Clear to auscultation  bilaterally. Respiration even and unlabored   Heart: Regular rate and rhythm, S1, S2   No  murmurs, clicks, rub or gallops   Abdomen: Soft, non-tender, nondistended. Bowel sounds present. No organomegaly. Genitourinary: Benign . Neuromuscular:      Hip flexion on the left 5- , on the right 4  DF on the right 2, left 4+  PF on the right 3+ 4-, on the left 5-  Sensation intact   Skin/extremity: No rashes, no erythema.  No calf tenderness BLE  No peripheral edema, back incision healing well                                                                            Functional Assessment:  Gross Assessment  AROM: Generally decreased, functional (08/21/17 1000)  Strength: Generally decreased, functional (R Ankle DF- 3/5) (08/21/17 1000)  Sensation: Impaired (08/18/17 1500)       Balance  Sitting - Static: Good (unsupported) (08/23/17 1500)  Sitting - Dynamic: Good (unsupported) (08/23/17 1500)  Standing - Static: Fair (with RW) (08/23/17 1500)  Standing - Dynamic : Impaired (08/23/17 1500)           Toileting  Cues: Physical assistance for pants down (08/16/17 1021)         Sanjeev Tellez Fall Risk Assessment:  Sanjeev Tellez Fall Risk  Mobility: Ambulates or transfers with assist devices or assistance/unsteady gait (08/24/17 2121)  Mobility Interventions: Patient to call before getting OOB (08/24/17 2121)  Mentation: Alert, oriented x 3 (08/24/17 2121)  Mentation Interventions: Door open when patient unattended;Room close to nurse's station (08/24/17 2121)  Medication: Patient receiving anticonvulsants, sedatives(tranquilizers), psychotropics or hypnotics, hypoglycemics, narcotics, sleep aids, antihypertensives, laxatives, or diuretics (08/24/17 2121)  Medication Interventions: Patient to call before getting OOB (08/24/17 2121)  Elimination: Needs assistance with toileting (08/24/17 2121)  Elimination Interventions: Call light in reach (08/24/17 2121)  Prior Fall History: No (08/24/17 2121)  History of Falls Interventions: Door open when patient unattended (08/24/17 1410)  Total Score: 3 (08/24/17 2121)  Standard Fall Precautions: Yes (08/24/17 2121)  High Fall Risk: Yes (08/17/17 2236)     Speech Assessment:         Ambulation:  Gait  Speed/Lillie: Pace decreased (<100 feet/min); Slow (08/21/17 1000)  Step Length: Left shortened;Right shortened (08/21/17 1000)  Swing Pattern: Right asymmetrical (08/21/17 1000)  Gait Abnormalities: Decreased step clearance;Shuffling gait (R LE narrowed foot placement at IC) (08/21/17 1000)  Distance (ft): 80 Feet (ft) (08/24/17 1639)  Assistive Device: Rina Cynthia, rolling;Gait belt;Brace/Splint (dept toe up brace) (08/24/17 1639)  Rail Use: Both (08/21/17 1000)     Labs/Studies:  Recent Results (from the past 67 hour(s))   PROTHROMBIN TIME + INR    Collection Time: 08/23/17  6:46 AM   Result Value Ref Range    Prothrombin time 25.0 (H) 9.6 - 12.0 sec    INR 2.3 (H) 0.9 - 1.2         Assessment:     Problem List as of 8/25/2017  Never Reviewed          Codes Class Noted - Resolved    Personal history of DVT (deep vein thrombosis) ICD-10-CM: C56.944  ICD-9-CM: V12.51  8/21/2017 - Present        * (Principal)Spinal stenosis of lumbar region ICD-10-CM: M48.06  ICD-9-CM: 724.02  8/8/2017 - Present        Spondylolisthesis of lumbar region ICD-10-CM: M43.16  ICD-9-CM: 738.4  8/8/2017 - Present        Spinal stenosis ICD-10-CM: M48.00  ICD-9-CM: 724.00  8/8/2017 - Present        Gastro - esophageal reflux disease (Chronic) ICD-9-CM: 530.81  12/19/2009 - Present        Cellulitis and abscess of trunk ICD-10-CM: L03.319, L02.219  ICD-9-CM: 682.2  12/19/2009 - Present    Overview Signed 12/19/2009 10:48 AM by Britney Duarte. Secondary to infected right thoracotomy incision.              Hydropneumothorax ICD-10-CM: J94.8  ICD-9-CM: 511.89  12/19/2009 - Present        Anemia ICD-10-CM: D64.9  ICD-9-CM: 285.9  12/10/2009 - Present        Mycetoma (Chronic) ICD-10-CM: B47.9  ICD-9-CM: 039.9  12/1/2009 - Present        Status Post Partial Lobectomy of Lung-mycetoma ICD-10-CM: Z90.2  ICD-9-CM: V45.89  12/1/2009 - Present        Bronchiectasis ([facility] (Chronic) ICD-10-CM: J47.9  ICD-9-CM: 494.0  12/1/2009 - Present        RESOLVED: Fever ICD-10-CM: R50.9  ICD-9-CM: 780.60  12/10/2009 - 12/11/2009        RESOLVED: Renal failure ICD-10-CM: N19  ICD-9-CM: 465  12/10/2009 - 12/14/2009        RESOLVED: Hyponatremia ICD-10-CM: E87.1  ICD-9-CM: 276.1  12/10/2009 - 12/14/2009        RESOLVED: Hypotension ICD-10-CM: I95.9  ICD-9-CM: 458.9  12/10/2009 - 12/11/2009        RESOLVED: Systemic inflammatory response syndrome due to non-infectious process with acute organ dysfunction ([facility] ICD-10-CM: R65.11  ICD-9-CM: 995.94  12/10/2009 - 12/14/2009        RESOLVED: Hypoxemia ICD-10-CM: R09.02  ICD-9-CM: 799.02  12/1/2009 - 12/20/2009              Plan: This is a 64 YO with 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       Rehabilitation Plan  Continue PT for a minimum of 1.5 hours a day, at least 5 out of 7 days per week to address bed mobility, transfers, ambulation, strengthening, balance, and endurance. The patient to wear back brace when OOB. Patient to continuee to focus on L ankle dorsiflexion strength. Continue OT for a minimum of 1.5 hours a day, at least 5 out of 7 days per week to address ADL (bathing, LE dressing, toileting) safety; energy conservations and adaptive equipment as needed.  -8/22 stress spinal precautions!! Cont brace when oob     L foot drop; activates, improving.  Hopefully no AFO needed but with use of one, all deficits resolve but want to keep working on active DF thus, continue ACE dorsiflex assist; e stim          Hx of DVT/ PE, coagulopathy - Anticoagulation management - on long term coumadin secondary to recurrent DVTs/PE. - Continue coumadin, INR- 2.3 on 8/14, lovenox had been stopped. - pharmacy assisting with dosing. INR 2.3->2.0 ( 8/15)->3.1(8/16) >3.2->2.2(8/18)->2.3->1.7( 8/20). Will bridge with Lovenox as high risk for DVT. - conitnue to monitor closely  -8/21 INR therapeutic, dc Lovenox; 8/22 Pt PEND; ? CHANGE TO M,W,F  -8/23 INR 2.3 therapeutic on home dose of Coumadin 10mg daily  -8/23 INR 2.4 therapeutic; change to M. W F  -8/25 pt had his first DVT and PE after a previous hip surgery in 2007, subsequently had 3 other DVTs over the years in the LLE ; no hypercoag w/u done; will pursue this as pt is wondering if he can come off of coumadin after 10yrs of treatment.       Spinal stenosis -  Begun on prednisone PTA for a few weeks. Prednisone tapered, discontinued.       HTN -  Controlled on bystolic. BP in good range.       Bowel mgmt - on scheduled senokot , prn dulcolax, MOM      GI prophylaxis - on pepcid bid      Pain mgmt- advance to oxycodone 5-15mg q4prn, flexeril prn, yesterday received total 45mg of oxycodone. Fair control. Continue to monitor, assess pain level. -  No new pain source. No increase in pain. Takes about 3 tabs roxicodone prn.       Insomnia - prn trazodone      Acute blood loss anemia - Hgb - 11.8 > 12.2 on 8/13. Monitor.   - macrocytic anemia. Recheck 8/25        Time spent was 25 minutes with over 1/2 in direct patient care/examination, consultation and coordination of care.      Signed By: Brayden Suarez MD     August 25, 2017

## 2017-08-25 NOTE — PROGRESS NOTES
Spoke to pt and girlfriend regarding discharge. Family training scheduled. Pt still deciding where he will go at discharge - to his home or to girlfriend's house -[address] [place] [address]. Pt to notify SW of decision. Continue to follow.

## 2017-08-26 LAB
INR PPP: 2.7 (ref 0.9–1.2)
PROTHROMBIN TIME: 30 SEC (ref 9.6–12)

## 2017-08-26 PROCEDURE — 85610 PROTHROMBIN TIME: CPT | Performed by: PHYSICAL MEDICINE & REHABILITATION

## 2017-08-26 PROCEDURE — 65310000000 HC RM PRIVATE REHAB

## 2017-08-26 PROCEDURE — 36415 COLL VENOUS BLD VENIPUNCTURE: CPT | Performed by: PHYSICAL MEDICINE & REHABILITATION

## 2017-08-26 PROCEDURE — 97110 THERAPEUTIC EXERCISES: CPT

## 2017-08-26 PROCEDURE — 97116 GAIT TRAINING THERAPY: CPT

## 2017-08-26 PROCEDURE — 99232 SBSQ HOSP IP/OBS MODERATE 35: CPT | Performed by: PHYSICAL MEDICINE & REHABILITATION

## 2017-08-26 PROCEDURE — 74011250637 HC RX REV CODE- 250/637: Performed by: PHYSICAL MEDICINE & REHABILITATION

## 2017-08-26 PROCEDURE — 97530 THERAPEUTIC ACTIVITIES: CPT

## 2017-08-26 RX ADMIN — OXYCODONE HYDROCHLORIDE 15 MG: 5 TABLET ORAL at 11:58

## 2017-08-26 RX ADMIN — WARFARIN SODIUM 10 MG: 10 TABLET ORAL at 21:37

## 2017-08-26 RX ADMIN — NEBIVOLOL HYDROCHLORIDE 5 MG: 5 TABLET ORAL at 08:18

## 2017-08-26 RX ADMIN — FAMOTIDINE 20 MG: 20 TABLET ORAL at 17:02

## 2017-08-26 RX ADMIN — TRAZODONE HYDROCHLORIDE 50 MG: 50 TABLET ORAL at 23:13

## 2017-08-26 RX ADMIN — STANDARDIZED SENNA CONCENTRATE AND DOCUSATE SODIUM 2 TABLET: 8.6; 5 TABLET, FILM COATED ORAL at 08:18

## 2017-08-26 RX ADMIN — STANDARDIZED SENNA CONCENTRATE AND DOCUSATE SODIUM 2 TABLET: 8.6; 5 TABLET, FILM COATED ORAL at 17:02

## 2017-08-26 RX ADMIN — FAMOTIDINE 20 MG: 20 TABLET ORAL at 08:21

## 2017-08-26 RX ADMIN — OXYCODONE HYDROCHLORIDE 15 MG: 5 TABLET ORAL at 23:13

## 2017-08-26 RX ADMIN — OXYCODONE HYDROCHLORIDE 15 MG: 5 TABLET ORAL at 17:01

## 2017-08-26 NOTE — PROGRESS NOTES
Jennifer Guallpa MD,   Medical Director  [address] [facility], [address] [facility]  Tel: [phone]       [facility] PROGRESS NOTE    Naveed Turner  Admit Date: 8/12/2017  Admit Diagnosis: Post Spinal Surgery Rehab;Spinal stenosis of lumbar region    Subjective     Patient seen and examined. Vss. No acute complaints. PT, OT well tolerated. Steady gains made. No new barrier to progress noted. Left shoulder pain, chronic. Last injection 4mos ago by Dr. Dominick Borden    Objective:     Current Facility-Administered Medications   Medication Dose Route Frequency    warfarin (COUMADIN) tablet 10 mg  10 mg Oral QHS    oxyCODONE IR (ROXICODONE) tablet 5-15 mg  5-15 mg Oral Q4H PRN    traZODone (DESYREL) tablet 50 mg  50 mg Oral QHS PRN    senna-docusate (PERICOLACE) 8.6-50 mg per tablet 2 Tab  2 Tab Oral BID    bisacodyl (DULCOLAX) suppository 10 mg  10 mg Rectal DAILY PRN    bisacodyl (DULCOLAX) tablet 5 mg  5 mg Oral DAILY PRN    cyclobenzaprine (FLEXERIL) tablet 5 mg  5 mg Oral TID PRN    magnesium hydroxide (MILK OF MAGNESIA) 400 mg/5 mL oral suspension 30 mL  30 mL Oral DAILY PRN    alum-mag hydroxide-simeth (MYLANTA) oral suspension 30 mL  30 mL Oral Q4H PRN    testosterone enanthate (DELATESTRYL) injection 200 mg  200 mg IntraMUSCular Q 14 DAYS    famotidine (PEPCID) tablet 20 mg  20 mg Oral BID    ondansetron (ZOFRAN ODT) tablet 4 mg  4 mg Oral Q6H PRN    nebivolol (BYSTOLIC) tablet 5 mg  5 mg Oral DAILY     Review of Systems:Denies chest pain, shortness of breath, cough, headache, visual problems, abdominal pain, dysurea, calf pain. Pertinent positives are as noted in the medical records and unremarkable otherwise. Visit Vitals    /68 (BP 1 Location: Right arm, BP Patient Position: At rest)    Pulse 60    Temp 98.4 °F (36.9 °C)    Resp 19    SpO2 93%        Physical Exam:   General: Alert and age appropriately oriented.   No acute cardio respiratory distress. HEENT: Normocephalic,no scleral icterus  Oral mucosa moist without cyanosis   Lungs: Clear to auscultation  bilaterally. Respiration even and unlabored   Heart: Regular rate and rhythm, S1, S2   No  murmurs, clicks, rub or gallops   Abdomen: Soft, non-tender, nondistended. Bowel sounds present. No organomegaly. Genitourinary: Benign . Neuromuscular:      Hip flexion on the left 5- , on the right 4  DF on the right 2, left 4+  PF on the right 3+ 4-, on the left 5-  Sensation intact   Exam limited by pain. Skin/extremity: No rashes, no erythema.  No calf tenderness BLE  Wound healing well                                                                            Functional Assessment:  Gross Assessment  AROM: Generally decreased, functional (08/21/17 1000)  Strength: Generally decreased, functional (R Ankle DF- 3/5) (08/21/17 1000)  Sensation: Impaired (08/18/17 1500)       Balance  Sitting - Static: Good (unsupported) (08/25/17 1600)  Sitting - Dynamic: Good (unsupported) (08/25/17 1600)  Standing - Static: Fair (08/25/17 1600)  Standing - Dynamic : Impaired (08/25/17 1600)           Toileting  Cues: Physical assistance for pants down (08/16/17 1021)         Oksana Zaldivar Fall Risk Assessment:  Oksana Zaldivar Fall Risk  Mobility: Ambulates or transfers with assist devices or assistance/unsteady gait (08/25/17 2000)  Mobility Interventions: Patient to call before getting OOB (08/25/17 2000)  Mentation: Alert, oriented x 3 (08/25/17 2000)  Mentation Interventions: Door open when patient unattended (08/25/17 2000)  Medication: Patient receiving anticonvulsants, sedatives(tranquilizers), psychotropics or hypnotics, hypoglycemics, narcotics, sleep aids, antihypertensives, laxatives, or diuretics (08/25/17 2000)  Medication Interventions: Patient to call before getting OOB (08/25/17 2000)  Elimination: Needs assistance with toileting (08/25/17 2000)  Elimination Interventions: Call light in reach (08/25/17 2000)  Prior Fall History: No (08/25/17 2000)  History of Falls Interventions: Door open when patient unattended (08/25/17 2000)  Total Score: 3 (08/25/17 2000)  Standard Fall Precautions: Yes (08/25/17 2000)  High Fall Risk: Yes (08/17/17 2236)     Speech Assessment:         Ambulation:  Gait  Speed/Lillie: Pace decreased (<100 feet/min); Slow (08/21/17 1000)  Step Length: Left shortened;Right shortened (08/21/17 1000)  Swing Pattern: Right asymmetrical (08/21/17 1000)  Gait Abnormalities: Decreased step clearance;Shuffling gait (R LE narrowed foot placement at IC) (08/21/17 1000)  Distance (ft): 0 Feet (ft) (08/25/17 1600)  Assistive Device: Trey Gens, rolling;Orthotic device (LSO, ossur foot up device) (08/25/17 1100)  Rail Use: Both (08/25/17 1600)     Labs/Studies:  Recent Results (from the past 72 hour(s))   HEMOGLOBIN    Collection Time: 08/25/17  7:25 AM   Result Value Ref Range    HGB 12.6 (L) 13.6 - 17.2 g/dL   PROTHROMBIN TIME + INR    Collection Time: 08/25/17  7:25 AM   Result Value Ref Range    Prothrombin time 30.1 (H) 9.6 - 12.0 sec    INR 2.7 (H) 0.9 - 1.2     PROTEIN C ANTIGEN, PLASMA    Collection Time: 08/25/17  9:21 AM   Result Value Ref Range    Protein C Antigen PENDING %   FACTOR V LEIDEN MUTATION    Collection Time: 08/25/17  9:21 AM   Result Value Ref Range    Factor V Leiden Mutation PENDING %   LUPUS ANTICOAG PANEL    Collection Time: 08/25/17  9:21 AM   Result Value Ref Range    Lupus Anticoag Panel PENDING    CBC W/O DIFF    Collection Time: 08/25/17  9:21 AM   Result Value Ref Range    WBC 5.1 4.3 - 11.1 K/uL    RBC 4.18 (L) 4.23 - 5.67 M/uL    HGB 13.9 13.6 - 17.2 g/dL    HCT 41.2 41.1 - 50.3 %    MCV 98.6 (H) 79.6 - 97.8 FL    MCH 33.3 (H) 26.1 - 32.9 PG    MCHC 33.7 31.4 - 35.0 g/dL    RDW 13.4 11.9 - 14.6 %    PLATELET 696 907 - 217 K/uL    MPV 9.1 (L) 10.8 - 14.1 FL   PROTEIN S ANTIGEN    Collection Time: 08/25/17  9:21 AM   Result Value Ref Range    Protein S Antigen PENDING %   PROTHROMBIN TIME + INR    Collection Time: 08/26/17  6:38 AM   Result Value Ref Range    Prothrombin time 30.0 (H) 9.6 - 12.0 sec    INR 2.7 (H) 0.9 - 1.2         Assessment:     Problem List as of 8/26/2017  Never Reviewed          Codes Class Noted - Resolved    Personal history of DVT (deep vein thrombosis) ICD-10-CM: Z86.718  ICD-9-CM: V12.51  8/21/2017 - Present        * (Principal)Spinal stenosis of lumbar region ICD-10-CM: M48.06  ICD-9-CM: 724.02  8/8/2017 - Present        Spondylolisthesis of lumbar region ICD-10-CM: M43.16  ICD-9-CM: 738.4  8/8/2017 - Present        Spinal stenosis ICD-10-CM: M48.00  ICD-9-CM: 724.00  8/8/2017 - Present        Gastro - esophageal reflux disease (Chronic) ICD-9-CM: 530.81  12/19/2009 - Present        Cellulitis and abscess of trunk ICD-10-CM: L03.319, L02.219  ICD-9-CM: 682.2  12/19/2009 - Present    Overview Signed 12/19/2009 10:48 AM by Levi Rodríguez. Secondary to infected right thoracotomy incision.              Hydropneumothorax ICD-10-CM: J94.8  ICD-9-CM: 511.89  12/19/2009 - Present        Anemia ICD-10-CM: D64.9  ICD-9-CM: 285.9  12/10/2009 - Present        Mycetoma (Chronic) ICD-10-CM: B47.9  ICD-9-CM: 039.9  12/1/2009 - Present        Status Post Partial Lobectomy of Lung-mycetoma ICD-10-CM: Z90.2  ICD-9-CM: V45.89  12/1/2009 - Present        Bronchiectasis ([address] (Chronic) ICD-10-CM: J47.9  ICD-9-CM: 494.0  12/1/2009 - Present        RESOLVED: Fever ICD-10-CM: R50.9  ICD-9-CM: 780.60  12/10/2009 - 12/11/2009        RESOLVED: Renal failure ICD-10-CM: N19  ICD-9-CM: 269  12/10/2009 - 12/14/2009        RESOLVED: Hyponatremia ICD-10-CM: E87.1  ICD-9-CM: 276.1  12/10/2009 - 12/14/2009        RESOLVED: Hypotension ICD-10-CM: I95.9  ICD-9-CM: 458.9  12/10/2009 - 12/11/2009        RESOLVED: Systemic inflammatory response syndrome due to non-infectious process with acute organ dysfunction ([facility] ICD-10-CM: R65.11  ICD-9-CM: 995.94  12/10/2009 - 12/14/2009        RESOLVED: Hypoxemia ICD-10-CM: R09.02  ICD-9-CM: 799.02  12/1/2009 - 12/20/2009              Plan: This is a 62 YO with 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       Rehabilitation Plan  Continue PT for a minimum of 1.5 hours a day, at least 5 out of 7 days per week to address bed mobility, transfers, ambulation, strengthening, balance, and endurance. The patient to wear back brace when OOB. Patient to continuee to focus on L ankle dorsiflexion strength. Continue OT for a minimum of 1.5 hours a day, at least 5 out of 7 days per week to address ADL (bathing, LE dressing, toileting) safety; energy conservations and adaptive equipment as needed.  -8/22 stress spinal precautions!! Cont brace when oob      L foot drop; activates, improving. Hopefully no AFO needed but with use of one, all deficits resolve but want to keep working on active DF thus, continue ACE dorsiflex assist; e stim          Hx of DVT/ PE, coagulopathy - Anticoagulation management - on long term coumadin secondary to recurrent DVTs/PE. - Continue coumadin, INR- 2.3 on 8/14, lovenox had been stopped. - pharmacy assisting with dosing. INR 2.3->2.0 ( 8/15)->3.1(8/16) >3.2->2.2(8/18)->2.3->1.7( 8/20). Will bridge with Lovenox as high risk for DVT. - conitnue to monitor closely  -8/21 INR therapeutic, dc Lovenox; 8/22 Pt PEND; ? CHANGE TO M,W,F  -8/23 INR 2.3 therapeutic on home dose of Coumadin 10mg daily  -8/23 INR 2.4 therapeutic; change to M. W F  -8/25 pt had his first DVT and PE after a previous hip surgery in 2007, subsequently had 3 other DVTs over the years in the LLE ; no hypercoag w/u done; will pursue this as pt is wondering if he can come off of coumadin after 10yrs of treatment.   -8/26 INR 2.7 as it was yesterday.  Pharmacy following, daily INR for now due to chance of elevation above desired level      Spinal stenosis -  Begun on prednisone PTA for a few weeks. Prednisone tapered, discontinued. Chronic left shoulder pain; will schedule f/u with Dr. Corey Nageotte. No need to inject now, no significant pain      HTN -  Controlled on bystolic. BP in good range.       Bowel mgmt - on scheduled senokot , prn dulcolax, MOM      GI prophylaxis - on pepcid bid      Pain mgmt- advance to oxycodone 5-15mg q4prn, flexeril prn, yesterday received total 45mg of oxycodone. Fair control. Continue to monitor, assess pain level. -  No new pain source. No increase in pain. Takes about 3 tabs roxicodone prn.       Insomnia - prn trazodone      Acute blood loss anemia - Hgb - 11.8 > 12.2 on 8/13. Monitor.   - macrocytic anemia. Recheck 8/25; 13.9 with only slt elevation in MCV         Time spent was 25 minutes with over 1/2 in direct patient care/examination, consultation and coordination of care.      Signed By: Brayden Suarez MD     August 26, 2017

## 2017-08-26 NOTE — PROGRESS NOTES
08/26/17 1230   Time Spent With Patient   Time In 1100   Time Out 1158   Patient Seen For: AM    Patient seen for strengthening/endurance and transfer skills. Sit to stand x 4 with min A for each attempt. Nu-step at level 4 for 15 minutes. Went . 66 miles. Patient tolerated well. Patient taken back to room. Transferred to recliner with supervision. All essentials within reach. Continue POC.        Shahida Monae  8/26/2017

## 2017-08-26 NOTE — PROGRESS NOTES
Warfarin dosing per pharmacist    Topher Sherman is a 63 y.o. male. Indication:  Hx recurrent DVTs and PE    Goal INR:  2.0 - 3.0    Home dose:  10 mg qhs    Risk factors or significant drug interactions:  none    Other anticoagulants:      Daily Monitoring  Date  INR     Warfarin dose  HGB              Notes  8/9  1.0  10 mg   14.3  8/10  1.0  10 mg   12.7  8/11  1.4  10 mg   11.8  8/12  1.4  10 mg   ---  8/13  1.9  10 mg   12.2  8/14  2.3  10 mg    ---  8/15  2.0  2 mg + 10 mg  ---  8/16  3.1  5 mg   ---  8/17  3.2  5 mg   12.2  8/18  2.2  5 mg + 5 mg  ---  8/19  2.3  10 mg   ---  8/20  1.7  10 mg   ---  8/21  2.1  10 mg   ---  8/22  2.2  10 mg   ---  8/23  2.3  10 mg   ---  8/24  ---  10 mg   ---  8/25  2.7  10 mg   13.9   8/26  2.7  10 mg   ---      Pharmacy consulted to assist with warfarin dosing on 8/13. His warfarin was being held for surgery on 8/8 and was resumed on 8/9. INR trending up and now at 2.7 and stable. Will continue with 10 mg every evening for now. To be cautious as INR is trending up, will draw INR daily moving forward. Will continue to follow.      Thank you,  Jelena Love, PharmD, BCPS  Clinical Pharmacist  [phone]

## 2017-08-26 NOTE — PROGRESS NOTES
PHYSICAL THERAPY DAILY NOTE  Time In: 1031  Time Out: 1111  Patient Seen For: AM;Gait training;Patient education; Therapeutic exercise;Transfer training    Subjective: \"I feel pretty good, bored sitting in that bed\"         Objective: Pt rates 0/10 pain before and after therapy session  Spinal (back brace on when out of bed, fall precautions)  GROSS ASSESSMENT Daily Assessment            BED/MAT MOBILITY Daily Assessment            TRANSFERS Daily Assessment    Transfer Type: SPT with walker  Transfer Assistance : 4 (Contact guard assistance)  Sit to Stand Assistance: Stand-by assistance       GAIT Daily Assessment   Additional 100ft with FWW CGA  DF assist device donned Amount of Assistance: 4 (Contact guard assistance)  Distance (ft): 70 Feet (ft)  Assistive Device: Walker       STEPS or STAIRS Daily Assessment            BALANCE Daily Assessment    Sitting - Static: Good (unsupported)       WHEELCHAIR MOBILITY Daily Assessment            LOWER EXTREMITY EXERCISES Daily Assessment   Standing with FWW for support heel raises, hip abd, knee flex, hip flex, SLR all AROM SBA Extremity: Both  Exercise Type #1: Standing lower extremity strengthening  Sets Performed: 2  Reps Performed: 15  Level of Assist: Stand-by assistance          Assessment: Pt requires seated rest breaks between exercises and gait training for recovery WNL, v.c.s needed for controlled slower pace with gait for safety and upright posture for efficiency.          Plan of Care: Continue with current POC to help pt achieve PLOF    Nydia Austin, KATH  8/26/2017

## 2017-08-26 NOTE — PROGRESS NOTES
Pt has participated in therapy today . Safety maintained . Pt given pain med this shift for C/O BACK pain .  Call bell within reach

## 2017-08-27 LAB
INR PPP: 2.5 (ref 0.9–1.2)
PROTHROMBIN TIME: 27.4 SEC (ref 9.6–12)

## 2017-08-27 PROCEDURE — 85610 PROTHROMBIN TIME: CPT | Performed by: PHYSICAL MEDICINE & REHABILITATION

## 2017-08-27 PROCEDURE — 36415 COLL VENOUS BLD VENIPUNCTURE: CPT | Performed by: PHYSICAL MEDICINE & REHABILITATION

## 2017-08-27 PROCEDURE — 74011250637 HC RX REV CODE- 250/637: Performed by: PHYSICAL MEDICINE & REHABILITATION

## 2017-08-27 PROCEDURE — 65310000000 HC RM PRIVATE REHAB

## 2017-08-27 PROCEDURE — 99232 SBSQ HOSP IP/OBS MODERATE 35: CPT | Performed by: PHYSICAL MEDICINE & REHABILITATION

## 2017-08-27 RX ADMIN — NEBIVOLOL HYDROCHLORIDE 5 MG: 5 TABLET ORAL at 09:02

## 2017-08-27 RX ADMIN — TRAZODONE HYDROCHLORIDE 50 MG: 50 TABLET ORAL at 22:07

## 2017-08-27 RX ADMIN — FAMOTIDINE 20 MG: 20 TABLET ORAL at 09:02

## 2017-08-27 RX ADMIN — FAMOTIDINE 20 MG: 20 TABLET ORAL at 21:22

## 2017-08-27 RX ADMIN — OXYCODONE HYDROCHLORIDE 15 MG: 5 TABLET ORAL at 18:05

## 2017-08-27 RX ADMIN — STANDARDIZED SENNA CONCENTRATE AND DOCUSATE SODIUM 2 TABLET: 8.6; 5 TABLET, FILM COATED ORAL at 21:22

## 2017-08-27 RX ADMIN — WARFARIN SODIUM 10 MG: 10 TABLET ORAL at 21:22

## 2017-08-27 RX ADMIN — OXYCODONE HYDROCHLORIDE 15 MG: 5 TABLET ORAL at 22:06

## 2017-08-27 RX ADMIN — OXYCODONE HYDROCHLORIDE 15 MG: 5 TABLET ORAL at 09:02

## 2017-08-27 RX ADMIN — STANDARDIZED SENNA CONCENTRATE AND DOCUSATE SODIUM 2 TABLET: 8.6; 5 TABLET, FILM COATED ORAL at 09:02

## 2017-08-27 NOTE — PROGRESS NOTES
Warfarin dosing per pharmacist    Kristy Gross is a 63 y.o. male. Indication:  Hx recurrent DVTs and PE    Goal INR:  2.0 - 3.0    Home dose:  10 mg qhs    Risk factors or significant drug interactions:  none    Other anticoagulants:      Daily Monitoring  Date  INR     Warfarin dose  HGB              Notes  8/9  1.0  10 mg   14.3  8/10  1.0  10 mg   12.7  8/11  1.4  10 mg   11.8  8/12  1.4  10 mg   ---  8/13  1.9  10 mg   12.2  8/14  2.3  10 mg    ---  8/15  2.0  2 mg + 10 mg  ---  8/16  3.1  5 mg   ---  8/17  3.2  5 mg   12.2  8/18  2.2  5 mg + 5 mg  ---  8/19  2.3  10 mg   ---  8/20  1.7  10 mg   ---  8/21  2.1  10 mg   ---  8/22  2.2  10 mg   ---  8/23  2.3  10 mg   ---  8/24  ---  10 mg   ---  8/25  2.7  10 mg   13.9   8/26  2.7  10 mg   ---  8/26  2.5  10 mg   ---      Pharmacy consulted to assist with warfarin dosing on 8/13. His warfarin was being held for surgery on 8/8 and was resumed on 8/9. INR trending down today now at 2.5 and stable. Will continue with 10 mg every evening for now. To be cautious as INR is trending up overall, will draw INR daily moving forward. Will continue to follow.      Thank you,  Karl Fair, PharmD, [facility]  Clinical Pharmacist  [phone]

## 2017-08-27 NOTE — PROGRESS NOTES
Angeles Olivier MD,   Medical Director  [address] [facility], [address] [facility]  Tel: [phone]       [facility] PROGRESS NOTE    Hazel Brittle  Admit Date: 8/12/2017  Admit Diagnosis: Post Spinal Surgery Rehab;Spinal stenosis of lumbar region    Subjective     Patient seen and examined. Vss. No acute complaints. PT, OT well tolerated. Steady gains made. No new barrier to progress noted. Feeling well. Pain controlled    Objective:     Current Facility-Administered Medications   Medication Dose Route Frequency    warfarin (COUMADIN) tablet 10 mg  10 mg Oral QHS    oxyCODONE IR (ROXICODONE) tablet 5-15 mg  5-15 mg Oral Q4H PRN    traZODone (DESYREL) tablet 50 mg  50 mg Oral QHS PRN    senna-docusate (PERICOLACE) 8.6-50 mg per tablet 2 Tab  2 Tab Oral BID    bisacodyl (DULCOLAX) suppository 10 mg  10 mg Rectal DAILY PRN    bisacodyl (DULCOLAX) tablet 5 mg  5 mg Oral DAILY PRN    cyclobenzaprine (FLEXERIL) tablet 5 mg  5 mg Oral TID PRN    magnesium hydroxide (MILK OF MAGNESIA) 400 mg/5 mL oral suspension 30 mL  30 mL Oral DAILY PRN    alum-mag hydroxide-simeth (MYLANTA) oral suspension 30 mL  30 mL Oral Q4H PRN    testosterone enanthate (DELATESTRYL) injection 200 mg  200 mg IntraMUSCular Q 14 DAYS    famotidine (PEPCID) tablet 20 mg  20 mg Oral BID    ondansetron (ZOFRAN ODT) tablet 4 mg  4 mg Oral Q6H PRN    nebivolol (BYSTOLIC) tablet 5 mg  5 mg Oral DAILY     Review of Systems:Denies chest pain, shortness of breath, cough, headache, visual problems, abdominal pain, dysurea, calf pain. Pertinent positives are as noted in the medical records and unremarkable otherwise. Visit Vitals    /68 (BP 1 Location: Right arm, BP Patient Position: At rest)    Pulse 72    Temp 98.2 °F (36.8 °C)    Resp 16    SpO2 92%        Physical Exam:   General: Alert and age appropriately oriented. No acute cardio respiratory distress.    HEENT: Normocephalic,no scleral icterus  Oral mucosa moist without cyanosis   Lungs: Clear to auscultation  bilaterally. Respiration even and unlabored   Heart: Regular rate and rhythm, S1, S2   No  murmurs, clicks, rub or gallops   Abdomen: Soft, non-tender, nondistended. Bowel sounds present. No organomegaly. Genitourinary: Benign . Neuromuscular:      Hip flexion on the left 5- , on the right 4  DF on the right 2, left 4+  PF on the right 3+ 4-, on the left 5-  Sensation intact    Skin/extremity: No rashes, no erythema. No calf tenderness BLE  Wound healing well.  C/d/i                                                                            Functional Assessment:  Gross Assessment  AROM: Generally decreased, functional (08/21/17 1000)  Strength: Generally decreased, functional (R Ankle DF- 3/5) (08/21/17 1000)  Sensation: Impaired (08/18/17 1500)       Balance  Sitting - Static: Good (unsupported) (08/26/17 1200)  Sitting - Dynamic: Good (unsupported) (08/25/17 1600)  Standing - Static: Fair (08/25/17 1600)  Standing - Dynamic : Impaired (08/25/17 1600)           Toileting  Cues: Physical assistance for pants down (08/16/17 1021)         Koreen Masters Fall Risk Assessment:  Koreen Masters Fall Risk  Mobility: Ambulates or transfers with assist devices or assistance/unsteady gait (08/26/17 2319)  Mobility Interventions: Patient to call before getting OOB (08/26/17 2319)  Mentation: Alert, oriented x 3 (08/26/17 2319)  Mentation Interventions: Door open when patient unattended (08/26/17 2319)  Medication: Patient receiving anticonvulsants, sedatives(tranquilizers), psychotropics or hypnotics, hypoglycemics, narcotics, sleep aids, antihypertensives, laxatives, or diuretics (08/26/17 2319)  Medication Interventions: Patient to call before getting OOB (08/26/17 2319)  Elimination: Needs assistance with toileting (08/26/17 2319)  Elimination Interventions: Call light in reach (08/26/17 2319)  Prior Fall History: No (08/26/17 2319)  History of Falls Interventions: Door open when patient unattended (08/26/17 2319)  Total Score: 3 (08/26/17 2319)  Standard Fall Precautions: Yes (08/25/17 2000)  High Fall Risk: Yes (08/17/17 2236)     Speech Assessment:         Ambulation:  Gait  Speed/Lillie: Pace decreased (<100 feet/min); Slow (08/21/17 1000)  Step Length: Left shortened;Right shortened (08/21/17 1000)  Swing Pattern: Right asymmetrical (08/21/17 1000)  Gait Abnormalities: Decreased step clearance;Shuffling gait (R LE narrowed foot placement at IC) (08/21/17 1000)  Distance (ft): 70 Feet (ft) (08/26/17 1200)  Assistive Device: Walker (08/26/17 1200)  Rail Use: Both (08/25/17 1600)     Labs/Studies:  Recent Results (from the past 72 hour(s))   HEMOGLOBIN    Collection Time: 08/25/17  7:25 AM   Result Value Ref Range    HGB 12.6 (L) 13.6 - 17.2 g/dL   PROTHROMBIN TIME + INR    Collection Time: 08/25/17  7:25 AM   Result Value Ref Range    Prothrombin time 30.1 (H) 9.6 - 12.0 sec    INR 2.7 (H) 0.9 - 1.2     PROTEIN C ANTIGEN, PLASMA    Collection Time: 08/25/17  9:21 AM   Result Value Ref Range    Protein C Antigen PENDING %   FACTOR V LEIDEN MUTATION    Collection Time: 08/25/17  9:21 AM   Result Value Ref Range    Factor V Leiden Mutation PENDING %   LUPUS ANTICOAG PANEL    Collection Time: 08/25/17  9:21 AM   Result Value Ref Range    Lupus Anticoag Panel PENDING    CBC W/O DIFF    Collection Time: 08/25/17  9:21 AM   Result Value Ref Range    WBC 5.1 4.3 - 11.1 K/uL    RBC 4.18 (L) 4.23 - 5.67 M/uL    HGB 13.9 13.6 - 17.2 g/dL    HCT 41.2 41.1 - 50.3 %    MCV 98.6 (H) 79.6 - 97.8 FL    MCH 33.3 (H) 26.1 - 32.9 PG    MCHC 33.7 31.4 - 35.0 g/dL    RDW 13.4 11.9 - 14.6 %    PLATELET 941 080 - 214 K/uL    MPV 9.1 (L) 10.8 - 14.1 FL   PROTEIN S ANTIGEN    Collection Time: 08/25/17  9:21 AM   Result Value Ref Range    Protein S Antigen PENDING %   PROTHROMBIN TIME + INR    Collection Time: 08/26/17  6:38 AM   Result Value Ref Range    Prothrombin time 30.0 (H) 9.6 - 12.0 sec    INR 2.7 (H) 0.9 - 1.2         Assessment:     Problem List as of 8/27/2017  Never Reviewed          Codes Class Noted - Resolved    Personal history of DVT (deep vein thrombosis) ICD-10-CM: C81.779  ICD-9-CM: V12.51  8/21/2017 - Present        * (Principal)Spinal stenosis of lumbar region ICD-10-CM: M48.06  ICD-9-CM: 724.02  8/8/2017 - Present        Spondylolisthesis of lumbar region ICD-10-CM: M43.16  ICD-9-CM: 738.4  8/8/2017 - Present        Spinal stenosis ICD-10-CM: M48.00  ICD-9-CM: 724.00  8/8/2017 - Present        Gastro - esophageal reflux disease (Chronic) ICD-9-CM: 530.81  12/19/2009 - Present        Cellulitis and abscess of trunk ICD-10-CM: L03.319, L02.219  ICD-9-CM: 682.2  12/19/2009 - Present    Overview Signed 12/19/2009 10:48 AM by Karis Mitchell Secondary to infected right thoracotomy incision. Hydropneumothorax ICD-10-CM: J94.8  ICD-9-CM: 511.89  12/19/2009 - Present        Anemia ICD-10-CM: D64.9  ICD-9-CM: 285.9  12/10/2009 - Present        Mycetoma (Chronic) ICD-10-CM: B47.9  ICD-9-CM: 039.9  12/1/2009 - Present        Status Post Partial Lobectomy of Lung-mycetoma ICD-10-CM: Z90.2  ICD-9-CM: V45.89  12/1/2009 - Present        Bronchiectasis ([address] (Chronic) ICD-10-CM: J47.9  ICD-9-CM: 494.0  12/1/2009 - Present        RESOLVED: Fever ICD-10-CM: R50.9  ICD-9-CM: 780.60  12/10/2009 - 12/11/2009        RESOLVED: Renal failure ICD-10-CM: N19  ICD-9-CM: 513  12/10/2009 - 12/14/2009        RESOLVED: Hyponatremia ICD-10-CM: E87.1  ICD-9-CM: 276.1  12/10/2009 - 12/14/2009        RESOLVED: Hypotension ICD-10-CM: I95.9  ICD-9-CM: 458.9  12/10/2009 - 12/11/2009        RESOLVED: Systemic inflammatory response syndrome due to non-infectious process with acute organ dysfunction ([facility] [address] ICD-10-CM: R65.11  ICD-9-CM: 995.94  12/10/2009 - 12/14/2009        RESOLVED: Hypoxemia ICD-10-CM: R09.02  ICD-9-CM: 799.02  12/1/2009 - 12/20/2009              Plan:    This is a 62 YO with 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       Rehabilitation Plan  Continue PT for a minimum of 1.5 hours a day, at least 5 out of 7 days per week to address bed mobility, transfers, ambulation, strengthening, balance, and endurance. The patient to wear back brace when OOB. Patient to continuee to focus on L ankle dorsiflexion strength. Continue OT for a minimum of 1.5 hours a day, at least 5 out of 7 days per week to address ADL (bathing, LE dressing, toileting) safety; energy conservations and adaptive equipment as needed.  -8/22 stress spinal precautions!! Cont brace when oob      L foot drop; activates, improving. Hopefully no AFO needed but with use of one, all deficits resolve but want to keep working on active DF thus, continue ACE dorsiflex assist; e stim          Hx of DVT/ PE, coagulopathy - Anticoagulation management - on long term coumadin secondary to recurrent DVTs/PE. - Continue coumadin, INR- 2.3 on 8/14, lovenox had been stopped. - pharmacy assisting with dosing. INR 2.3->2.0 ( 8/15)->3.1(8/16) >3.2->2.2(8/18)->2.3->1.7( 8/20). Will bridge with Lovenox as high risk for DVT. - conitnue to monitor closely  -8/21 INR therapeutic, dc Lovenox; 8/22 Pt PEND; ? CHANGE TO M,W,F  -8/23 INR 2.3 therapeutic on home dose of Coumadin 10mg daily  -8/23 INR 2.4 therapeutic; change to M. W F  -8/25 pt had his first DVT and PE after a previous hip surgery in 2007, subsequently had 3 other DVTs over the years in the LLE ; no hypercoag w/u done; will pursue this as pt is wondering if he can come off of coumadin after 10yrs of treatment.   -8/26 INR 2.7 as it was yesterday. Pharmacy following, daily INR for now due to chance of elevation above desired level; 8/27 pending      Spinal stenosis -  Begun on prednisone PTA for a few weeks.  Prednisone tapered, discontinued.      Chronic left shoulder pain; will schedule f/u with Dr. Pacheco Jensen. No need to inject now, no significant pain      HTN -  Controlled on bystolic. BP in good range.       Bowel mgmt - on scheduled senokot , prn dulcolax, MOM      GI prophylaxis - on pepcid bid      Pain mgmt- advance to oxycodone 5-15mg q4prn, flexeril prn, yesterday received total 45mg of oxycodone. Fair control. Continue to monitor, assess pain level. -  No new pain source. No increase in pain. Takes about 3 tabs roxicodone prn.       Insomnia - prn trazodone      Acute blood loss anemia - Hgb - 11.8 > 12.2 on 8/13. Monitor.   - macrocytic anemia. Recheck 8/25; 13.9 with only slt elevation in MCV               Time spent was 25 minutes with over 1/2 in direct patient care/examination, consultation and coordination of care.      Signed By: Anais Leahy MD     August 27, 2017

## 2017-08-28 LAB
HCYS SERPL-SCNC: 10.3 UMOL/L (ref 0–15)
INR PPP: 2.9 (ref 0.9–1.2)
PROTHROMBIN TIME: 32.1 SEC (ref 9.6–12)

## 2017-08-28 PROCEDURE — 97110 THERAPEUTIC EXERCISES: CPT

## 2017-08-28 PROCEDURE — 74011250637 HC RX REV CODE- 250/637: Performed by: PHYSICAL MEDICINE & REHABILITATION

## 2017-08-28 PROCEDURE — 97530 THERAPEUTIC ACTIVITIES: CPT

## 2017-08-28 PROCEDURE — 36415 COLL VENOUS BLD VENIPUNCTURE: CPT | Performed by: PHYSICAL MEDICINE & REHABILITATION

## 2017-08-28 PROCEDURE — 65310000000 HC RM PRIVATE REHAB

## 2017-08-28 PROCEDURE — 99232 SBSQ HOSP IP/OBS MODERATE 35: CPT | Performed by: PHYSICAL MEDICINE & REHABILITATION

## 2017-08-28 PROCEDURE — 97535 SELF CARE MNGMENT TRAINING: CPT

## 2017-08-28 PROCEDURE — 97116 GAIT TRAINING THERAPY: CPT

## 2017-08-28 PROCEDURE — 85610 PROTHROMBIN TIME: CPT | Performed by: PHYSICAL MEDICINE & REHABILITATION

## 2017-08-28 RX ADMIN — FAMOTIDINE 20 MG: 20 TABLET ORAL at 21:31

## 2017-08-28 RX ADMIN — OXYCODONE HYDROCHLORIDE 15 MG: 5 TABLET ORAL at 08:19

## 2017-08-28 RX ADMIN — NEBIVOLOL HYDROCHLORIDE 5 MG: 5 TABLET ORAL at 08:19

## 2017-08-28 RX ADMIN — WARFARIN SODIUM 10 MG: 10 TABLET ORAL at 21:31

## 2017-08-28 RX ADMIN — TRAZODONE HYDROCHLORIDE 50 MG: 50 TABLET ORAL at 21:31

## 2017-08-28 RX ADMIN — FAMOTIDINE 20 MG: 20 TABLET ORAL at 08:19

## 2017-08-28 RX ADMIN — OXYCODONE HYDROCHLORIDE 15 MG: 5 TABLET ORAL at 18:24

## 2017-08-28 RX ADMIN — STANDARDIZED SENNA CONCENTRATE AND DOCUSATE SODIUM 2 TABLET: 8.6; 5 TABLET, FILM COATED ORAL at 21:31

## 2017-08-28 RX ADMIN — OXYCODONE HYDROCHLORIDE 15 MG: 5 TABLET ORAL at 14:14

## 2017-08-28 RX ADMIN — OXYCODONE HYDROCHLORIDE 15 MG: 5 TABLET ORAL at 22:00

## 2017-08-28 RX ADMIN — STANDARDIZED SENNA CONCENTRATE AND DOCUSATE SODIUM 2 TABLET: 8.6; 5 TABLET, FILM COATED ORAL at 08:19

## 2017-08-28 NOTE — PROGRESS NOTES
OT Daily Note    Time In 1046   Time Out 1126     Subjective:\"I think a shot in my shoulder would help\" Agreeable to therapy. Pain:Tolerated in left shoulder during exercises and stretches  Education:On HEP for LUE shoulder strengthening and stretching. Interdisciplinary Communication: Collaborated with KATH, Yani Arguello and patient is making progress towards goals. Precautions: Other (comment) (falls & brace on when out of bed)    Balance/functional mobility Daily Assessment   Ambulated [de-identified]' with RW with supervision. Strengthening/activity tolerance Daily Assessment   Theraband used with red for scapular rows 2 sets of 15. Also used red band for IR 2 sets of 12. No band and towel roll used under LUE for ER 2 set of 12. Also completed 2 sets of 6 isometric ER holding for 5-8 seconds. Joint mobilization and PROM completed in LUE with focus on shoulder flexion, IR, and abduction. Ended session: In room, edge of bed with SO in room.      Molly Maguire OTR/L

## 2017-08-28 NOTE — PROGRESS NOTES
PT WEEKLY PROGRESS NOTE   Time In: 1195   Time Out: 1435    Subjective: patient reporting he feels OK. Reports he is due for pain medication. Patient asking RN for pain medication at beginning of treatment. Reports he completed family training this AM and he is going to D/C to his girlfriends home. Reports he has a RW at home         Objective: Vital Signs:  Patient Vitals for the past 12 hrs:   Temp Pulse Resp BP SpO2   08/28/17 1530 98.3 °F (36.8 °C) 61 17 116/73 96 %   08/28/17 0735 98.3 °F (36.8 °C) 60 16 111/72 96 %     Pain level: 2 to 7 out of 10  Pain location:low back  Pain interventions:Pain medication, rest, positioning,body mechanics    Patient education:Bed mobility training,transfer training, gait training, fall precautions, activity pacing, spinal precautions, w/c mobility and parts management, body mechanics, Patient verbalizing understanding and demonstrating partial understanding of patient education. Recommend follow up education. Interdisciplinary Communication:spoke with PTA regarding progress towards goals and D/C plans    Spinal (back brace on when out of bed, fall precautions)    Outcome Measures:    FIM SCORES Initial Assessment Weekly Progress Assessment 8/28/2017   Bed/Chair/Wheelchair Transfers 3 5   Wheelchair Mobility 4 5   Walking [place] 2 2   Steps/Stairs  (NT) 2   Please see [facility] Interdisciplinary Eval: Coordination/Balance Section for details regarding FIM score description.     BED/CHAIR/WHEELCHAIR TRANSFERS Initial Assessment Weekly Progress Assessment 8/28/2017   Rolling Right 4 (Minimal assistance) 6 (Modified independent)   Rolling Left 4 (Minimal assistance) 6 (Modified independent)   Supine to Sit 4 (Minimal assistance) 6 (Modified independent)   Sit to Stand Minimal assistance Supervision   Sit to Supine 4 (Minimal assistance) 6 (Modified independent)   Transfer Type SPT with walker (assist with sit to stand and stand to sit) SPT with walker   Comments Increased time and effort with cues for body mechanics for log rolling and supine<>sidelying<>sit<>stand. Patient using bed rail for rolling and sidelying<>sit. Increased back pain during bed mobility and transfers  Increased time and effort demonstrating improved body mechanics with log rolling and supine<>sidelying<>sit<>stand   Car Transfer Not tested Not tested   Car Type         GROSS ASSESSMENT Weekly Progress Assessment 8/28/2017   AROM Generally decreased, functional (right foot active DF -5 secondary to weakness)   Strength  (LLE hip -3/5 to +3/5,knee +4/5 to 5/5,ankle -3/5 to 4/5) RLE hip 2/5 to +2/5, knee 4/5 to +4/5, ankle DF 2/5 PF +2/5   Coordination  Bilateral LE impaired R>L   Tone  LE hypotonia RLE   Sensation Intact   PROM  [facility]     POSTURE Weekly Progress Assessment 8/28/2017   Posture (WDL) Exceptions to WDL   Posture Assessment Forward head;Rounded shoulders;Trunk flexion     WHEELCHAIR MOBILITY/MANAGEMENT Initial Assessment Weekly Progress Assessment 8/28/2017   Able to Propel 150 feet 150 feet   Curbs/ramps assistance required 0 (Not tested) 0 (Not tested)   Wheelchair set up assistance required 3 (Moderate assistance)  Min assist with leg rests   Wheelchair management Manages left brake, Manages right brake Manages left brake;Manages right brake     WALKING INDEPENDENCE Initial Assessment Weekly Progress Assessment 8/28/2017   Assistive device Walker, rolling, Gait belt, Orthotic device (LSO) Walker, rolling;Orthotic device (LSO, [place] foot up device)   Ambulation assistance - level surface 4 (Contact guard assistance)  SBA with cues for posture correction and to control step length and gait speed   Distance 75 Feet (ft) (75ft x 1 w/o DF assist, 25 ft x 1 with DF assist RLE) 100 Feet (ft)   Comments slow cont partial step through gait pattern with flexed posture.  Steppage gait pattern RLE during swing due to foot drop on right, PF right foot at initial contact, decreased ankle PF and knee flexion bilaterally at terminal stance, decreased ankle DF and knee ext LLE at initial contact. Gait training x 80 ft x 2 with bilateral UE support and and 30ft x 1 with single UE support  in parallel bars facilitating upright posture, improved step length and step clearance and RLE stance stability with Supervision and visual/verbal cues     GAIT Weekly Progress Assessment 8/28/2017   Gait Description (WDL)  slow cont step through gait with mild trunk flexion throughout gait cycle   Gait Abnormalities  decreased step length and step clearance with decreased ankle DF and knee ext at initial contact and decreased ankle PF and knee flex at terminal stance     STEPS/STAIRS Initial Assessment Weekly Progress Assessment 8/28/2017   Steps/Stairs ambulated 0 4   Rail Use Both Both   Comments Unable to ambulate up/down steps at this time due to low back pain, impaired balance and decreased strength RLE  Single step at a time leading up with LLE and down with RLE, Increased time and effort with cues for improved step clearance RLE and for posture control   Curbs/Ramps 0 (Not tested)  NA   Static standing balance activities in parallel  Bars with left foot on 3 inch step facilitating upright posture with hip stability inhibiting trendelenburg on Right stance LE. Alternating single UE support x 10 reps followed  5 reps of no UE support. Completed 3 sets with verbal, visual and tactile cues . Attempted RLE single limb stance with single UE support and patient unable to maintain upright midline position       Assessment: patient making steady progress towards goals. Patient has met LTGs for bed mobility and transfers and progressing towards revised LTGs for steps and gait. Patient has good potential to meet revised LTGs by D/C date of 08/30/17. RLE strength slowly improving as well as tolerance to treatment. Less low back pain during treatment demonstrating improved body mechanics and posture during functional mobility.  Demonstrating improved RLE stance stability during gait with RW. Recommending follow up [facility] PT after D/C. Patient returned to room at end of treatment. Patient supine in bed with head of bed elevated and bed rails up x 2. Needs placed in reach of patient. Plan of Care: patient seen for weekly assessment. Goals updated and revised. Please see Care Plan for updated LTGs.     Family Training: Has taken place (with girl friend this AM)    Ana María Koehler, PT  8/28/2017

## 2017-08-28 NOTE — PROGRESS NOTES
PHYSICAL THERAPY DAILY NOTE  Time In: 1002  Time Out: 1048  Patient Seen For: AM;Transfer training;Gait training; Therapeutic exercise; Other (see progress notes)    Subjective: Patient had no complaints. Objective: Girlfriend in for training. Back brace on. Girlfriend showed good knowledge of assisting patient. Spinal (back brace on when out of bed, fall precautions)  GROSS ASSESSMENT Daily Assessment            BED/MAT MOBILITY Daily Assessment    Supine to Sit : 6 (Modified independent)  Sit to Supine : 6 (Modified independent)       TRANSFERS Daily Assessment    Transfer Type: SPT with walker  Transfer Assistance : 4 (Contact guard assistance)  Sit to Stand Assistance: Stand-by assistance       GAIT Daily Assessment    Amount of Assistance: 4 (Contact guard assistance)  Distance (ft): 80 Feet (ft)  Assistive Device: Walker, rolling;Brace/Splint (with back brace)       STEPS or STAIRS Daily Assessment    Steps/Stairs Ambulated (#): 8  Level of Assist : 5 (Stand-by assistance)  Rail Use: Right        BALANCE Daily Assessment            WHEELCHAIR MOBILITY Daily Assessment            LOWER EXTREMITY EXERCISES Daily Assessment    Extremity: Both  Exercise Type #1: Supine lower extremity strengthening  Sets Performed: 1  Reps Performed: 20  Level of Assist: Stand-by assistance          Assessment: Patient making great progress increasing strength. Plan of Care: Continue with plan of care to reach PT goals. Returned to room with call boles at reach.     Willian Matias, PTA  8/28/2017

## 2017-08-28 NOTE — PROGRESS NOTES
Problem: Falls - Risk of  Goal: *Absence of Falls  Document Erick Fall Risk and appropriate interventions in the flowsheet.    Outcome: Progressing Towards Goal  Fall Risk Interventions:  Mobility Interventions: Patient to call before getting OOB     Mentation Interventions: More frequent rounding     Medication Interventions: Patient to call before getting OOB     Elimination Interventions: Patient to call for help with toileting needs     History of Falls Interventions: Door open when patient unattended           Comments:   Calls appropriately for assistance

## 2017-08-28 NOTE — PROGRESS NOTES
Problem: Falls - Risk of  Goal: *Absence of Falls  Document Erick Fall Risk and appropriate interventions in the flowsheet.    Outcome: Progressing Towards Goal  Fall Risk Interventions:  Mobility Interventions: Patient to call before getting OOB     Mentation Interventions: Door open when patient unattended     Medication Interventions: Patient to call before getting OOB, Teach patient to arise slowly     Elimination Interventions: Call light in reach, Elevated toilet seat, Patient to call for help with toileting needs, Toilet paper/wipes in reach, Toileting schedule/hourly rounds, Urinal in reach     History of Falls Interventions: Door open when patient unattended

## 2017-08-28 NOTE — PROGRESS NOTES
Warfarin dosing per pharmacist    Smith Thomson is a 63 y.o. male. Indication:  Hx recurrent DVTs and PE    Goal INR:  2.0 - 3.0    Home dose:  10 mg qhs    Risk factors or significant drug interactions:  none    Other anticoagulants:      Daily Monitoring  Date  INR     Warfarin dose  HGB              Notes  8/9  1.0  10 mg   14.3  8/10  1.0  10 mg   12.7  8/11  1.4  10 mg   11.8  8/12  1.4  10 mg   ---  8/13  1.9  10 mg   12.2  8/14  2.3  10 mg    ---  8/15  2.0  2 mg + 10 mg  ---  8/16  3.1  5 mg   ---  8/17  3.2  5 mg   12.2  8/18  2.2  5 mg + 5 mg  ---  8/19  2.3  10 mg   ---  8/20  1.7  10 mg   ---  8/21  2.1  10 mg   ---  8/22  2.2  10 mg   ---  8/23  2.3  10 mg   ---  8/24  ---  10 mg   ---  8/25  2.7  10 mg   13.9   8/26  2.7  10 mg   ---  8/27  2.5  10 mg   ---  8/28  2.9  10 mg   ---      Pharmacy consulted to assist with warfarin dosing on 8/13. His warfarin was being held for surgery on 8/8 and was resumed on 8/9. INR remains stable and within therapeutic range. Will continue with 10 mg every evening for now. INR MWF moving forward. Plans noted for anticipated discharge on Wednesday. Will continue to follow.      Thank you,  Avni Leahy, PharmD  Clinical Pharmacist  [phone]

## 2017-08-28 NOTE — PROGRESS NOTES
Alison Fisher MD,   Medical Director  [address] [facility], [address] [facility]  Tel: [phone]       [facility] PROGRESS NOTE    Clifford Chaipn  Admit Date: 8/12/2017  Admit Diagnosis: Post Spinal Surgery Rehab;Spinal stenosis of lumbar region    Subjective     Stiff this a.m and rates pain as a 6/10. Appears comfortable. No b/b issues. Slept well . Anticipating dc Wed     Objective:     Current Facility-Administered Medications   Medication Dose Route Frequency    warfarin (COUMADIN) tablet 10 mg  10 mg Oral QHS    oxyCODONE IR (ROXICODONE) tablet 5-15 mg  5-15 mg Oral Q4H PRN    traZODone (DESYREL) tablet 50 mg  50 mg Oral QHS PRN    senna-docusate (PERICOLACE) 8.6-50 mg per tablet 2 Tab  2 Tab Oral BID    bisacodyl (DULCOLAX) suppository 10 mg  10 mg Rectal DAILY PRN    bisacodyl (DULCOLAX) tablet 5 mg  5 mg Oral DAILY PRN    cyclobenzaprine (FLEXERIL) tablet 5 mg  5 mg Oral TID PRN    magnesium hydroxide (MILK OF MAGNESIA) 400 mg/5 mL oral suspension 30 mL  30 mL Oral DAILY PRN    alum-mag hydroxide-simeth (MYLANTA) oral suspension 30 mL  30 mL Oral Q4H PRN    testosterone enanthate (DELATESTRYL) injection 200 mg  200 mg IntraMUSCular Q 14 DAYS    famotidine (PEPCID) tablet 20 mg  20 mg Oral BID    ondansetron (ZOFRAN ODT) tablet 4 mg  4 mg Oral Q6H PRN    nebivolol (BYSTOLIC) tablet 5 mg  5 mg Oral DAILY     Review of Systems:Denies chest pain, shortness of breath, cough, headache, visual problems, abdominal pain, dysurea, calf pain. Pertinent positives are as noted in the medical records and unremarkable otherwise. Visit Vitals    /72    Pulse 60    Temp 98.3 °F (36.8 °C)    Resp 16    SpO2 96%        Physical Exam:   General: Alert and age appropriately oriented. No acute cardio respiratory distress.    HEENT: Normocephalic,no scleral icterus  Oral mucosa moist without cyanosis   Lungs: Clear to auscultation bilaterally. Respiration even and unlabored   Heart: Regular rate and rhythm, S1, S2   No  murmurs, clicks, rub or gallops   Abdomen: Soft, non-tender, nondistended. Bowel sounds present. No organomegaly. Genitourinary: Benign . Neuromuscular:      Hip flexion on the left 5- , on the right 4  DF on the right 2, left 4+  PF on the right 3+ 4-, on the left 5-  No sensory deficits distally. Exam limited by pain. Skin/extremity: No rashes, no erythema. No calf tenderness BLE  Wound healing well                                                                            Functional Assessment:  Gross Assessment  AROM: Generally decreased, functional (08/21/17 1000)  Strength: Generally decreased, functional (R Ankle DF- 3/5) (08/21/17 1000)  Sensation: Impaired (08/18/17 1500)       Balance  Sitting - Static: Good (unsupported) (08/26/17 1200)  Sitting - Dynamic: Good (unsupported) (08/25/17 1600)  Standing - Static: Fair (08/25/17 1600)  Standing - Dynamic : Impaired (08/25/17 1600)           Toileting  Cues: Physical assistance for pants down (08/16/17 1021)         Mercedes Montez Fall Risk Assessment:  Mercedes Montez Fall Risk  Mobility: Ambulates or transfers with assist devices or assistance/unsteady gait (08/27/17 1916)  Mobility Interventions: Patient to call before getting OOB (08/27/17 1916)  Mentation: Alert, oriented x 3 (08/27/17 1916)  Mentation Interventions: Door open when patient unattended (08/27/17 1916)  Medication: Patient receiving anticonvulsants, sedatives(tranquilizers), psychotropics or hypnotics, hypoglycemics, narcotics, sleep aids, antihypertensives, laxatives, or diuretics (08/27/17 1916)  Medication Interventions: Patient to call before getting OOB; Teach patient to arise slowly (08/27/17 1916)  Elimination: Needs assistance with toileting (08/27/17 1916)  Elimination Interventions: Call light in reach;Elevated toilet seat;Patient to call for help with toileting needs; Toilet paper/wipes in reach; Toileting schedule/hourly rounds;Urinal in reach (08/27/17 1916)  Prior Fall History: No (08/27/17 1916)  History of Falls Interventions: Door open when patient unattended (08/27/17 1916)  Total Score: 3 (08/27/17 1916)  Standard Fall Precautions: Yes (08/27/17 1916)  High Fall Risk: Yes (08/17/17 2236)     Speech Assessment:         Ambulation:  Gait  Speed/Lillie: Pace decreased (<100 feet/min); Slow (08/21/17 1000)  Step Length: Left shortened;Right shortened (08/21/17 1000)  Swing Pattern: Right asymmetrical (08/21/17 1000)  Gait Abnormalities: Decreased step clearance;Shuffling gait (R LE narrowed foot placement at IC) (08/21/17 1000)  Distance (ft): 70 Feet (ft) (08/26/17 1200)  Assistive Device: Walker (08/26/17 1200)  Rail Use: Both (08/25/17 1600)     Labs/Studies:  Recent Results (from the past 72 hour(s))   PROTEIN C ANTIGEN, PLASMA    Collection Time: 08/25/17  9:21 AM   Result Value Ref Range    Protein C Antigen PENDING %   FACTOR V LEIDEN MUTATION    Collection Time: 08/25/17  9:21 AM   Result Value Ref Range    Factor V Leiden Mutation PENDING %   LUPUS ANTICOAG PANEL    Collection Time: 08/25/17  9:21 AM   Result Value Ref Range    Lupus Anticoag Panel PENDING    CBC W/O DIFF    Collection Time: 08/25/17  9:21 AM   Result Value Ref Range    WBC 5.1 4.3 - 11.1 K/uL    RBC 4.18 (L) 4.23 - 5.67 M/uL    HGB 13.9 13.6 - 17.2 g/dL    HCT 41.2 41.1 - 50.3 %    MCV 98.6 (H) 79.6 - 97.8 FL    MCH 33.3 (H) 26.1 - 32.9 PG    MCHC 33.7 31.4 - 35.0 g/dL    RDW 13.4 11.9 - 14.6 %    PLATELET 343 966 - 643 K/uL    MPV 9.1 (L) 10.8 - 14.1 FL   PROTEIN S ANTIGEN    Collection Time: 08/25/17  9:21 AM   Result Value Ref Range    Protein S Antigen PENDING %   PROTHROMBIN TIME + INR    Collection Time: 08/26/17  6:38 AM   Result Value Ref Range    Prothrombin time 30.0 (H) 9.6 - 12.0 sec    INR 2.7 (H) 0.9 - 1.2     PROTHROMBIN TIME + INR    Collection Time: 08/27/17  8:10 AM   Result Value Ref Range Prothrombin time 27.4 (H) 9.6 - 12.0 sec    INR 2.5 (H) 0.9 - 1.2         Assessment:     Problem List as of 8/28/2017  Never Reviewed          Codes Class Noted - Resolved    Personal history of DVT (deep vein thrombosis) ICD-10-CM: L70.420  ICD-9-CM: V12.51  8/21/2017 - Present        * (Principal)Spinal stenosis of lumbar region ICD-10-CM: M48.06  ICD-9-CM: 724.02  8/8/2017 - Present        Spondylolisthesis of lumbar region ICD-10-CM: M43.16  ICD-9-CM: 738.4  8/8/2017 - Present        Spinal stenosis ICD-10-CM: M48.00  ICD-9-CM: 724.00  8/8/2017 - Present        Gastro - esophageal reflux disease (Chronic) ICD-9-CM: 530.81  12/19/2009 - Present        Cellulitis and abscess of trunk ICD-10-CM: L03.319, L02.219  ICD-9-CM: 682.2  12/19/2009 - Present    Overview Signed 12/19/2009 10:48 AM by Kalie Aguilera. Secondary to infected right thoracotomy incision.              Hydropneumothorax ICD-10-CM: J94.8  ICD-9-CM: 511.89  12/19/2009 - Present        Anemia ICD-10-CM: D64.9  ICD-9-CM: 285.9  12/10/2009 - Present        Mycetoma (Chronic) ICD-10-CM: B47.9  ICD-9-CM: 039.9  12/1/2009 - Present        Status Post Partial Lobectomy of Lung-mycetoma ICD-10-CM: Z90.2  ICD-9-CM: V45.89  12/1/2009 - Present        Bronchiectasis ([address] (Chronic) ICD-10-CM: J47.9  ICD-9-CM: 494.0  12/1/2009 - Present        RESOLVED: Fever ICD-10-CM: R50.9  ICD-9-CM: 780.60  12/10/2009 - 12/11/2009        RESOLVED: Renal failure ICD-10-CM: N19  ICD-9-CM: 674  12/10/2009 - 12/14/2009        RESOLVED: Hyponatremia ICD-10-CM: E87.1  ICD-9-CM: 276.1  12/10/2009 - 12/14/2009        RESOLVED: Hypotension ICD-10-CM: I95.9  ICD-9-CM: 458.9  12/10/2009 - 12/11/2009        RESOLVED: Systemic inflammatory response syndrome due to non-infectious process with acute organ dysfunction ([facility] ICD-10-CM: R65.11  ICD-9-CM: 995.94  12/10/2009 - 12/14/2009        RESOLVED: Hypoxemia ICD-10-CM: R09.02  ICD-9-CM: 799.02  12/1/2009 - 12/20/2009 Plan:   This is a 64 YO with 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       Rehabilitation Plan  Continue PT for a minimum of 1.5 hours a day, at least 5 out of 7 days per week to address bed mobility, transfers, ambulation, strengthening, balance, and endurance. The patient to wear back brace when OOB. Patient to continuee to focus on L ankle dorsiflexion strength. Continue OT for a minimum of 1.5 hours a day, at least 5 out of 7 days per week to address ADL (bathing, LE dressing, toileting) safety; energy conservations and adaptive equipment as needed.  -8/22 stress spinal precautions!! Cont brace when oob      L foot drop; activates, improving. Hopefully no AFO needed but with use of one, all deficits resolve but want to keep working on active DF thus, continue ACE dorsiflex assist; e stim          Hx of DVT/ PE, coagulopathy - Anticoagulation management - on long term coumadin secondary to recurrent DVTs/PE. - Continue coumadin, INR- 2.3 on 8/14, lovenox had been stopped. - pharmacy assisting with dosing. INR 2.3->2.0 ( 8/15)->3.1(8/16) >3.2->2.2(8/18)->2.3->1.7( 8/20). Will bridge with Lovenox as high risk for DVT. - conitnue to monitor closely  -8/21 INR therapeutic, dc Lovenox; 8/22 Pt PEND; ? CHANGE TO M,W,F  -8/23 INR 2.3 therapeutic on home dose of Coumadin 10mg daily  -8/23 INR 2.4 therapeutic; change to M. W F  -8/25 pt had his first DVT and PE after a previous hip surgery in 2007, subsequently had 3 other DVTs over the years in the LLE ; no hypercoag w/u done; will pursue this as pt is wondering if he can come off of coumadin after 10yrs of treatment.   -8/26 INR 2.7 as it was yesterday.  Pharmacy following, daily INR for now due to chance of elevation above desired level; 8/27 2.5, will stop daily INR. 8/28 Will dc likely on home dose of Coumadin 10mg      Spinal stenosis -  Begun on prednisone PTA for a few weeks. Prednisone tapered, discontinued.       Chronic left shoulder pain; will schedule f/u with Dr. Arnaldo Lilly. No need to inject now, no significant pain      HTN -  Controlled on bystolic. BP in good range.       Bowel mgmt - on scheduled senokot , prn dulcolax, MOM      GI prophylaxis - on pepcid bid      Pain mgmt- advance to oxycodone 5-15mg q4prn, flexeril prn, yesterday received total 45mg of oxycodone. Fair control. Continue to monitor, assess pain level. -  No new pain source. No increase in pain. Takes about 3 tabs roxicodone prn.       Insomnia - prn trazodone      Acute blood loss anemia - Hgb - 11.8 > 12.2 on 8/13. Monitor.   - macrocytic anemia. Recheck 8/25; 13.9 with only slt elevation in MCV                Time spent was 25 minutes with over 1/2 in direct patient care/examination, consultation and coordination of care.      Signed By: Masha Brown MD     August 28, 2017

## 2017-08-28 NOTE — PROGRESS NOTES
OT WEEKLY PROGRESS NOTE    Time In: 8000  Time Out: 1000    COMPREHENSION MODE Initial Assessment Weekly Progress Assessment 8/28/2017   Score 6 7     EXPRESSION Initial Assessment Weekly Progress Assessment 8/28/2017   Primary Mode of Expression Verbal Verbal   Score 7 7   Comments No issues noted No issues noted     SOCIAL INTERACTION/ PRAGMATICS Initial Assessment Weekly Progress Assessment 8/28/2017   Score 7 7   Comments Appropriate Appropriate     PROBLEM SOLVING Initial Assessment Weekly Progress Assessment 8/28/2017   Score 7 7   Comments No issues noted No issues noted     MEMORY Initial Assessment Weekly Progress Assessment 8/28/2017   Score 7 7   Comments No issues noted No issues noted     BATHING Initial Assessment Weekly Progress Assessment 8/28/2017   Functional Level 3 6   Body parts patient bathed Abdomen, Arm, left, Arm, right, Chest, Josselin area, Thigh, left, Thigh, right Abdomen;Arm, left;Arm, right;Buttocks; Chest;Lower leg and foot, left; Lower leg and foot, right;Josselin area; Thigh, left; Thigh, right   Comments moderate assist LHS     TUB/SHOWER TRANSFER INDEPENDENCE Initial Assessment Weekly Progress Assessment 8/28/2017   Score 3 6   Comments moderate assist RW     UPPER BODY DRESSING/UNDRESSING Initial Assessment Weekly Progress Assessment 8/28/2017   Functional Level 4 6   Items applied/Steps completed Pullover (4 steps) Pullover (4 steps)   Comments assist with LSO RW to gather clothes     LOWER BODY DRESSING/UNDRESSING Initial Assessment Weekly Progress Assessment 8/28/2017   Functional Level 1 6   Items applied/Steps completed Shoe, left (1 step), Shoe, right (1 step), Sock, left (1 step), Sock, right (1 step), Elastic waist pants (3 steps) Shoe, left (1 step); Shoe, right (1 step); Sock, left (1 step); Sock, right (1 step); Elastic waist pants (3 steps)   Comments Dependent, will need AE AE     Plan of Care: Please see Care Plan for updated LTGs.    Family Training:  Completed with girlfriend today    Summary of Progress: Pt has made significant gains in balance, coordination, and awareness of spinal precautions allowing for improvements in bathing, dressing, toileting, and transfers, which improve his ability to return home. Pt would benefit from skilled therapy to address deficits in dynamic standing balance and awareness of spinal precautions to prevent readmission to the hospital.   Summary of Session: Pt was in bed and agreeable to tx. Pt was mod I for supine to sit. Pt's performance with ADL is reflected in above chart. Pt's girlfriend was present for family training and verbalized understanding. Pt was left with PTA Rajesh Diana. Continue with POC.      Juanis Hogue OTR/STANLEY  8/28/2017

## 2017-08-29 PROCEDURE — 97110 THERAPEUTIC EXERCISES: CPT

## 2017-08-29 PROCEDURE — 99232 SBSQ HOSP IP/OBS MODERATE 35: CPT | Performed by: PHYSICAL MEDICINE & REHABILITATION

## 2017-08-29 PROCEDURE — 97150 GROUP THERAPEUTIC PROCEDURES: CPT

## 2017-08-29 PROCEDURE — 97535 SELF CARE MNGMENT TRAINING: CPT

## 2017-08-29 PROCEDURE — 74011250637 HC RX REV CODE- 250/637: Performed by: PHYSICAL MEDICINE & REHABILITATION

## 2017-08-29 PROCEDURE — 97530 THERAPEUTIC ACTIVITIES: CPT

## 2017-08-29 PROCEDURE — 97116 GAIT TRAINING THERAPY: CPT

## 2017-08-29 PROCEDURE — 65310000000 HC RM PRIVATE REHAB

## 2017-08-29 RX ORDER — CYCLOBENZAPRINE HCL 10 MG
5 TABLET ORAL
Qty: 60 TAB | Refills: 0 | Status: SHIPPED | OUTPATIENT
Start: 2017-08-29 | End: 2018-03-02 | Stop reason: CLARIF

## 2017-08-29 RX ORDER — OXYCODONE HYDROCHLORIDE 5 MG/1
5-15 TABLET ORAL
Qty: 80 TAB | Refills: 0 | Status: SHIPPED | OUTPATIENT
Start: 2017-08-29 | End: 2018-03-02 | Stop reason: CLARIF

## 2017-08-29 RX ORDER — NEBIVOLOL 5 MG/1
5 TABLET ORAL DAILY
Qty: 30 TAB | Refills: 6 | Status: SHIPPED | OUTPATIENT
Start: 2017-08-29 | End: 2018-03-02 | Stop reason: CLARIF

## 2017-08-29 RX ORDER — WARFARIN 10 MG/1
10 TABLET ORAL
Qty: 30 TAB | Refills: 3 | Status: SHIPPED | OUTPATIENT
Start: 2017-08-29 | End: 2018-03-02 | Stop reason: CLARIF

## 2017-08-29 RX ORDER — TRAZODONE HYDROCHLORIDE 50 MG/1
50 TABLET ORAL
Qty: 30 TAB | Refills: 6 | Status: SHIPPED | OUTPATIENT
Start: 2017-08-29 | End: 2018-03-02 | Stop reason: CLARIF

## 2017-08-29 RX ADMIN — TRAZODONE HYDROCHLORIDE 50 MG: 50 TABLET ORAL at 22:17

## 2017-08-29 RX ADMIN — NEBIVOLOL HYDROCHLORIDE 5 MG: 5 TABLET ORAL at 08:17

## 2017-08-29 RX ADMIN — OXYCODONE HYDROCHLORIDE 15 MG: 5 TABLET ORAL at 08:17

## 2017-08-29 RX ADMIN — FAMOTIDINE 20 MG: 20 TABLET ORAL at 22:17

## 2017-08-29 RX ADMIN — OXYCODONE HYDROCHLORIDE 15 MG: 5 TABLET ORAL at 17:22

## 2017-08-29 RX ADMIN — STANDARDIZED SENNA CONCENTRATE AND DOCUSATE SODIUM 2 TABLET: 8.6; 5 TABLET, FILM COATED ORAL at 22:17

## 2017-08-29 RX ADMIN — FAMOTIDINE 20 MG: 20 TABLET ORAL at 08:17

## 2017-08-29 RX ADMIN — WARFARIN SODIUM 10 MG: 10 TABLET ORAL at 22:18

## 2017-08-29 RX ADMIN — OXYCODONE HYDROCHLORIDE 15 MG: 5 TABLET ORAL at 22:23

## 2017-08-29 RX ADMIN — STANDARDIZED SENNA CONCENTRATE AND DOCUSATE SODIUM 2 TABLET: 8.6; 5 TABLET, FILM COATED ORAL at 08:17

## 2017-08-29 NOTE — PROGRESS NOTES
Warfarin dosing per pharmacist    Krzysztof Zaman is a 63 y.o. male. Indication:  Hx recurrent DVTs and PE    Goal INR:  2.0 - 3.0    Home dose:  10 mg qhs    Risk factors or significant drug interactions:  none    Other anticoagulants:      Daily Monitoring  Date  INR     Warfarin dose  HGB              Notes  8/9  1.0  10 mg   14.3  8/10  1.0  10 mg   12.7  8/11  1.4  10 mg   11.8  8/12  1.4  10 mg   ---  8/13  1.9  10 mg   12.2  8/14  2.3  10 mg    ---  8/15  2.0  2 mg + 10 mg  ---  8/16  3.1  5 mg   ---  8/17  3.2  5 mg   12.2  8/18  2.2  5 mg + 5 mg  ---  8/19  2.3  10 mg   ---  8/20  1.7  10 mg   ---  8/21  2.1  10 mg   ---  8/22  2.2  10 mg   ---  8/23  2.3  10 mg   ---  8/24  ---  10 mg   ---  8/25  2.7  10 mg   13.9   8/26  2.7  10 mg   ---  8/27  2.5  10 mg   ---  8/28  2.9  10 mg   ---  8/29  ---  10 mg   ---      Pharmacy consulted to assist with warfarin dosing on 8/13. His warfarin was being held for surgery on 8/8 and was resumed on 8/9. No new INR value today. INR MWF per report. Will continue with 10 mg every evening for now. Plans noted for anticipated discharge on Wednesday per report. Thank you,  Nandini Moreno.  Darleen Lee, PharmD  Clinical Pharmacist  [phone]

## 2017-08-29 NOTE — PROGRESS NOTES
OT Daily Note  Time In 1115   Time Out 1200   Pain: Pt had pain in L shoulder that was aggervated with exercise. At rest pt did not require intervention. Functional Mobility   Pt walked back to room with RW with mod I.      Strengthening   Engaged in B shoulder exercises with 2 lb medicine ball, and 1 and 2 lb dumbbells. Pt's L shoulder was pretty irratated and had limited engagement. Pt completed 10 minutes on the NuStep on Level 4 and went 0.36 miles. Education   Green sock policy     Plan: Continue with POC. Pt was left in w/c with all needs within reach.      Sirena Pugh OTR/L  8/29/2017

## 2017-08-29 NOTE — PROGRESS NOTES
Problem: Falls - Risk of  Goal: *Absence of Falls  Document Erick Fall Risk and appropriate interventions in the flowsheet.    Fall Risk Interventions:  Mobility Interventions: Patient to call before getting OOB     Mentation Interventions: Door open when patient unattended     Medication Interventions: Patient to call before getting OOB     Elimination Interventions: Patient to call for help with toileting needs     History of Falls Interventions: Door open when patient unattended

## 2017-08-29 NOTE — PROGRESS NOTES
Spoke to patient regarding discharge tomorrow. He reports that he will being going to his girlfriend's house initially to stay until able to return to his home. Notified Chip with [facility] of plan. Family training was completed yesterday with GF. Application and RX provided for disabled  parking placard.

## 2017-08-29 NOTE — PROGRESS NOTES
OT DISCHARGE REPORT    Time In: 4828  Time Out: 1418    COMPREHENSION MODE Initial Assessment Discharge Assessment 8/29/2017   Score 6 7     EXPRESSION Initial Assessment Discharge Assessment 8/29/2017   Primary Mode of Expression Verbal Verbal   Score 7 7   Comments No issues noted No issues noted     SOCIAL INTERACTION/ PRAGMATICS Initial Assessment Discharge Assessment 8/29/2017   Score 7 7   Comments Appropriate Appropriate     PROBLEM SOLVING Initial Assessment Discharge Assessment 8/29/2017   Score 7 7   Comments No issues noted No issues noted     MEMORY Initial Assessment Discharge Assessment 8/29/2017   Score 7 7   Comments No issues noted No issues noted     EATING Initial Assessment Discharge Assessment 8/29/2017   Functional Level 7 7   Comments Independent Indpendent     BATHING Initial Assessment Discharge Assessment 8/29/2017   Functional Level 3 6   Body parts patient bathed Abdomen, Arm, left, Arm, right, Chest, Josselin area, Thigh, left, Thigh, right Abdomen;Arm, left;Arm, right;Buttocks; Chest;Lower leg and foot, left; Lower leg and foot, right;Josselin area; Thigh, left; Thigh, right   Comments moderate assist LHS     TUB/SHOWER TRANSFER INDEPENDENCE Initial Assessment Discharge Assessment 8/29/2017   Score 3 6  Type of Shower: Shower  Adaptive  Equipment:Tub transfer bench, Grab bars and Walker   Comments moderate assist RW     UPPER BODY DRESSING/UNDRESSING Initial Assessment Discharge Assessment 8/29/2017   Functional Level 4 6   Items applied/Steps completed Pullover (4 steps) Pullover (4 steps)   Comments assist with LSO RW to gather clothes     LOWER BODY DRESSING/UNDRESSING Initial Assessment Discharge Assessment 8/29/2017   Functional Level 1 6   Items applied/Steps completed Shoe, left (1 step), Shoe, right (1 step), Sock, left (1 step), Sock, right (1 step), Elastic waist pants (3 steps) Shoe, left (1 step); Shoe, right (1 step); Sock, right (1 step); Sock, left (1 step); Elastic waist pants (3 steps) Comments Dependent, will need AE AE     Plan of Care: Please see Care Plan for progress towards goals during stay  Precautions at Discharge: Spinal  Discharge Location: Home with girlfriend  Family Training: Completed with girlfriend     Recommendations/Functional Level:    Pt is mod I with all basic self-care. Recommended Continuing Therapy:  HH-OT  Residual Deficits:  Dynamic standing balance, safety awareness    Progress over LOS: Pt has made significant gains in balance, strength, coordination, and safety awareness allowing him to return to private residence with girlfriend due to improvements in bathing, dressing, and toileting. Summary of Session: Pt was in bed and agreeable to tx. Pt was moving slower secondary to having a sleeping pill last night. Pt's performance with ADL is reflected in above chart. Pt was left in recliner with all needs within reach.      Marcello Soto OTR/L  8/29/2017

## 2017-08-29 NOTE — PROGRESS NOTES
Subjective \"I am ready to go home tomorrow. \"   Activity W/c propulsion throughout o the cafeteria and back for a cup of coffee. Strength/Endurance Patient was without c/o fatigue or tiredness during the session. Balance SBA with ambulation with RW   Social Interaction Patient was friendly and pleasant during the session. Patient was concerned that someone had used his debit card number and was getting that worked out with his bank. Cognitive A&OX4   Comments Patient propelled his w/c to his room and he was left seated in his recliner with all needs within reach.      Madelin Martinez, CTRS

## 2017-08-29 NOTE — DISCHARGE SUMMARY
REHABILITATION DISCHARGE SUMMARY     Date of admission; 8/12/2017  Discharge Date:  8/30/17  Orthopedic Surgeon: Dr. Darrin Krishna  Primary Care Physician: Dr. Jazmine Hickey    Admission Condition: good  Discharged Condition: good    Hospital Course: The patient was admitted to [facility] on 8/12/2017   This is a 63 y. o. male with PMH of recurrent DVTs, PE on chronic coumadin, HTN, RLL lobectomy who was admitted on 8/6/2017 with a 2 week history of acute onset of bilateral leg pain, foot drop and falls. He was on vacation and having back-to-back falls with progressive difficulty walking. He had tingling  in his legs with profound weakness resulting in the patient unable to lift either foot because of acute foot drop. Lumbar MRI was positive for severe spinal stenosis at L3-4, L4-5 grade 1 anterolisthesis, severe facet arthropathy and L5 - S1 foraminal stenosis. He underwent a bilateral L3 - L5 laminectomy, partial facetectomy, foraminotomy and lumbar interbody fusion with spinal instrumentation on 8/8. Postoperatively, he was limited to wheelchair mobility with difficulty ambulating any distance. Patient has been experiencing severe severe radiating back pain into his legs requiring oral and iv pain management. Patient takes daily Coumadin due to a history of 3 DVTs and a pulmonary embolism.  He underwent a lobectomy due to infectous aspirate with history of previous pulmonary embolism.  His Coumadin as prescribed by Dr. Jazmine Hickey, his primary care physician. Rehabilitation Course:   From a medical standpt , he has done well. His INR is 2.9 on his normal dose of Coumadin 10mg daily. He will follow up with his PCP for ongoing mgt. His hx of DVT is quite extensive. He has never had a hypercoaguable w/u per pt, thus this was initiated here with results pending. I will follow these up and d/w pt at follow up visit. He continues to have a right foot drop but this is improving with PT.  His pain is well controlled with PRN roxicodone. His labs are wnl and of no concern or abnormality. His incision is well healed. He continues to wear a lumbar brace when OOB/. Functional Level On Admission:  bathing - mod A, upper body dressing - mod A, lower body dressing - max A, toileting - max A, bed mobility - mod A, transfers - min A, ambulation 25' with RW and min A. Functional Level At Discharge:        BED/CHAIR/WHEELCHAIR TRANSFERS Initial Assessment Weekly Progress Assessment 8/28/2017   Rolling Right 4 (Minimal assistance) 6 (Modified independent)   Rolling Left 4 (Minimal assistance) 6 (Modified independent)   Supine to Sit 4 (Minimal assistance) 6 (Modified independent)   Sit to Stand Minimal assistance Supervision   Sit to Supine 4 (Minimal assistance) 6 (Modified independent)   Transfer Type SPT with walker (assist with sit to stand and stand to sit) SPT with walker   Comments Increased time and effort with cues for body mechanics for log rolling and supine<>sidelying<>sit<>stand. Patient using bed rail for rolling and sidelying<>sit.  Increased back pain during bed mobility and transfers  Increased time and effort demonstrating improved body mechanics with log rolling and supine<>sidelying<>sit<>stand   Car Transfer Not tested Not tested   Car Type          GROSS ASSESSMENT Weekly Progress Assessment 8/28/2017   AROM Generally decreased, functional (right foot active DF -5 secondary to weakness)   Strength  (LLE hip -3/5 to +3/5,knee +4/5 to 5/5,ankle -3/5 to 4/5)   Coordination  Bilateral LE impaired R>L   Tone  LE hypotonia RLE   Sensation Intact   PROM  LE WFL      POSTURE Weekly Progress Assessment 8/28/2017   Posture (WDL) Exceptions to WDL   Posture Assessment Forward head;Rounded shoulders;Trunk flexion      WHEELCHAIR MOBILITY/MANAGEMENT Initial Assessment Weekly Progress Assessment 8/28/2017   Able to Propel 150 feet 150 feet   Curbs/ramps assistance required 0 (Not tested) 0 (Not tested)   Wheelchair set up assistance required 3 (Moderate assistance)  Min assist with leg rests   Wheelchair management Manages left brake, Manages right brake Manages left brake;Manages right brake      WALKING INDEPENDENCE Initial Assessment Weekly Progress Assessment 8/28/2017   Assistive device Walker, rolling, Gait belt, Orthotic device (LSO) Walker, rolling;Orthotic device (LSO, Ossur foot up device)   Ambulation assistance - level surface 4 (Contact guard assistance)  SBA with cues for posture correction and to control step length and gait speed   Distance 75 Feet (ft) (75ft x 1 w/o DF assist, 25 ft x 1 with DF assist RLE) 100 Feet (ft)   Comments slow cont partial step through gait pattern with flexed posture. Steppage gait pattern RLE during swing due to foot drop on right, PF right foot at initial contact, decreased ankle PF and knee flexion bilaterally at terminal stance, decreased ankle DF and knee ext LLE at initial contact.  Gait training x 80 ft x 2 with bilateral UE support and and 30ft x 1 with single UE support  in parallel bars facilitating upright posture, improved step length and step clearance and RLE stance stability with Supervision and visual/verbal cues      GAIT Weekly Progress Assessment 8/28/2017   Gait Description (WDL)  slow cont step through gait with mild trunk flexion throughout gait cycle   Gait Abnormalities  decreased step length and step clearance with decreased ankle DF and knee ext at initial contact and decreased ankle PF and knee flex at terminal stance      STEPS/STAIRS Initial Assessment Weekly Progress Assessment 8/28/2017   Steps/Stairs ambulated 0 4   Rail Use Both Both   Comments Unable to ambulate up/down steps at this time due to low back pain, impaired balance and decreased strength RLE  Single step at a time leading up with LLE and down with RLE, Increased time and effort with cues for improved step clearance RLE and for posture control   Curbs/Ramps 0 (Not tested)  NA   Static standing balance activities in parallel  Bars with left foot on 3 inch step facilitating upright posture with hip stability inhibiting trendelenburg on Right stance LE. Alternating single UE support x 10 reps followed  5 reps of no UE support. Completed 3 sets with verbal, visual and tactile cues . Attempted RLE single limb stance with single UE support and patient unable to maintain upright midline position         Assessment: patient making steady progress towards goals. Patient has met LTGs for bed mobility and transfers and progressing towards revised LTGs for steps and gait. Patient has good potential to meet revised LTGs by D/C date of 08/30/17. RLE strength slowly improving as well as tolerance to treatment. Less low back pain during treatment demonstrating improved body mechanics and posture during functional mobility. Demonstrating improved RLE stance stability during gait with RW. Recommending follow up [facility] after D/C.       Time Out: 1000     COMPREHENSION MODE Initial Assessment Weekly Progress Assessment 8/28/2017   Score 6 7      EXPRESSION Initial Assessment Weekly Progress Assessment 8/28/2017   Primary Mode of Expression Verbal Verbal   Score 7 7   Comments No issues noted No issues noted      SOCIAL INTERACTION/ PRAGMATICS Initial Assessment Weekly Progress Assessment 8/28/2017   Score 7 7   Comments Appropriate Appropriate      PROBLEM SOLVING Initial Assessment Weekly Progress Assessment 8/28/2017   Score 7 7   Comments No issues noted No issues noted      MEMORY Initial Assessment Weekly Progress Assessment 8/28/2017   Score 7 7   Comments No issues noted No issues noted      BATHING Initial Assessment Weekly Progress Assessment 8/28/2017   Functional Level 3 6   Body parts patient bathed Abdomen, Arm, left, Arm, right, Chest, Josselin area, Thigh, left, Thigh, right Abdomen;Arm, left;Arm, right;Buttocks; Chest;Lower leg and foot, left; Lower leg and foot, right;Josselin area; Thigh, left; Thigh, right   Comments moderate assist LHS      TUB/SHOWER TRANSFER INDEPENDENCE Initial Assessment Weekly Progress Assessment 8/28/2017   Score 3 6   Comments moderate assist RW      UPPER BODY DRESSING/UNDRESSING Initial Assessment Weekly Progress Assessment 8/28/2017   Functional Level 4 6   Items applied/Steps completed Pullover (4 steps) Pullover (4 steps)   Comments assist with LSO RW to gather clothes      LOWER BODY DRESSING/UNDRESSING Initial Assessment Weekly Progress Assessment 8/28/2017   Functional Level 1 6   Items applied/Steps completed Shoe, left (1 step), Shoe, right (1 step), Sock, left (1 step), Sock, right (1 step), Elastic waist pants (3 steps) Shoe, left (1 step); Shoe, right (1 step); Sock, left (1 step); Sock, right (1 step); Elastic waist pants (3 steps)   Comments Dependent, will need AE AE      Plan of Care: Please see Care Plan for updated LTGs. Family Training:  Completed with girlfriend today     Summary of Progress: Pt has made significant gains in balance, coordination, and awareness of spinal precautions allowing for improvements in bathing, dressing, toileting, and transfers, which improve his ability to return home. Pt would benefit from skilled therapy to address deficits in dynamic standing balance and awareness of spinal precautions to prevent readmission to the hospital.   Summary of Session: Pt was in bed and agreeable to tx. Pt was mod I for supine to sit. Pt's performance with ADL is reflected in above chart. Pt's girlfriend was present for family training and verbalized understanding. Past Medical History:   Diagnosis Date    Arthritis     multiple joints    Cellulitis and abscess of trunk December, 2009    Involving thorocotomy incision.     Chronic pain     OA- left hip    Depression     now controlled without meds    Ex-smoker for more than 1 year     Quit ~9/16/1994  1 pk/day 15 yrs    Hypertension     controlled with Bystolic    Insomnia     treated with prn Trazadone    PE (pulmonary thromboembolism) ([facility] [address] 2007    PE following right hip arthroplasty    Renal failure 12/10/2009    S/P lobectomy of lung     RLL    Thromboembolus ([facility] [address] 2009, 2011    left leg      Past Surgical History:   Procedure Laterality Date    CHEST SURGERY PROCEDURE UNLISTED      cleaned out RLL \"aspergillis\"    HX GI  April, 2008    Nissen fundoplication     HX HIP REPLACEMENT  2007    Right hip    HX LOBECTOMY  12/01/2009    RLL lobectomy for mycetoma resection--- and removal of IVC filter    HX THORACOTOMY  December, 14, 2009    Incision and drainage of thoracotomy incision with separate    NEUROLOGICAL PROCEDURE UNLISTED      VASCULAR SURGERY PROCEDURE UNLIST  2009    IVC filter inserted       Family History   Problem Relation Age of Onset    Diabetes Father     Heart Disease Father     Hypertension Father     Hypertension Brother     Diabetes Paternal Grandfather     Stroke Paternal Grandfather       Social History   Substance Use Topics    Smoking status: Former Smoker     Packs/day: 1.50     Years: 15.00     Quit date: 9/16/1994    Smokeless tobacco: Never Used    Alcohol use 1.0 oz/week     2 Cans of beer per week     Prior to Admission medications    Medication Sig Start Date End Date Taking? Authorizing Provider   cyclobenzaprine (FLEXERIL) 10 mg tablet Take 0.5 Tabs by mouth three (3) times daily as needed for Muscle Spasm(s). 8/29/17  Yes Jaclyn Granger MD   nebivolol (BYSTOLIC) 5 mg tablet Take 1 Tab by mouth daily. 8/29/17  Yes Amy R Thornton Goltz, MD   oxyCODONE IR (ROXICODONE) 5 mg immediate release tablet Take 1-3 Tabs by mouth every four (4) hours as needed. Max Daily Amount: 90 mg. 8/29/17  Yes Amy R Thornton Goltz, MD   traZODone (DESYREL) 50 mg tablet Take 1 Tab by mouth nightly as needed for Sleep. 8/29/17  Yes Amy R Thornton Goltz, MD   warfarin (COUMADIN) 10 mg tablet Take 1 Tab by mouth nightly.  8/29/17  Yes Tiff Tineo MD HYDROcodone-acetaminophen (NORCO)  mg tablet Take 1 Tab by mouth every four (4) hours as needed. Max Daily Amount: 6 Tabs. 8/10/17  Yes Luis Kong III, MD   warfarin (COUMADIN) 10 mg tablet Take 1 Tab by mouth daily. Rx Dr Ruth Dash PCP--- hx of multiple blood clots and PE following hip replacement (2007) -- Hold for 5 days prior to surgery with permission from Dr Ruth Dash-- last dose 7/31/17 3/24/16  Yes Historical Provider   enoxaparin (LOVENOX) 40 mg/0.4 mL 40 mg by SubCUTAneous route daily. Ayla Dash PCP-- per instructions provided by PCP   Yes Historical Provider   nebivolol (BYSTOLIC) 5 mg tablet Take 5 mg by mouth every morning. Indications: HYPERTENSION   Yes Historical Provider   traZODone (DESYREL) 50 mg tablet Take 25-50 mg by mouth nightly as needed for Sleep. Yes Historical Provider   predniSONE (DELTASONE) 20 mg tablet Take 1 Tab by mouth daily. 7/27/17   Historical Provider   testosterone cypionate (DEPO-TESTOSTERONE) 200 mg/mL injection by IntraMUSCular route every fourteen (14) days.     Historical Provider     Allergies   Allergen Reactions    Adhesive Contact Dermatitis        Lab Review:   Recent Results (from the past 67 hour(s))   PROTHROMBIN TIME + INR    Collection Time: 08/26/17  6:38 AM   Result Value Ref Range    Prothrombin time 30.0 (H) 9.6 - 12.0 sec    INR 2.7 (H) 0.9 - 1.2     PROTHROMBIN TIME + INR    Collection Time: 08/27/17  8:10 AM   Result Value Ref Range    Prothrombin time 27.4 (H) 9.6 - 12.0 sec    INR 2.5 (H) 0.9 - 1.2     PROTHROMBIN TIME + INR    Collection Time: 08/28/17  8:07 AM   Result Value Ref Range    Prothrombin time 32.1 (H) 9.6 - 12.0 sec    INR 2.9 (H) 0.9 - 1.2         Functional Assessment:  Gross Assessment  AROM: Generally decreased, functional (right foot active DF -5 secondary to weakness) (08/28/17 1600)  Strength:  (LLE hip -3/5 to +3/5,knee +4/5 to 5/5,ankle -3/5 to 4/5) (08/28/17 1600)  Sensation: Intact (08/28/17 1600) Balance  Sitting - Static: Good (unsupported) (08/28/17 1600)  Sitting - Dynamic: Good (unsupported) (08/28/17 1600)  Standing - Static: Fair (with RW) (08/28/17 1600)  Standing - Dynamic : Impaired (08/28/17 1600)           Toileting  Cues: Physical assistance for pants down (08/16/17 1021)         [facility] Fall Risk Assessment:  [facility] Fall Risk  Mobility: Ambulates or transfers with assist devices or assistance/unsteady gait (08/28/17 2100)  Mobility Interventions: Patient to call before getting OOB (08/28/17 2100)  Mentation: Alert, oriented x 3 (08/28/17 2100)  Mentation Interventions: Door open when patient unattended (08/28/17 2100)  Medication: Patient receiving anticonvulsants, sedatives(tranquilizers), psychotropics or hypnotics, hypoglycemics, narcotics, sleep aids, antihypertensives, laxatives, or diuretics (08/28/17 2100)  Medication Interventions: Patient to call before getting OOB (08/28/17 2100)  Elimination: Needs assistance with toileting (08/28/17 2100)  Elimination Interventions: Patient to call for help with toileting needs (08/28/17 2100)  Prior Fall History: No (08/28/17 2100)  History of Falls Interventions: Door open when patient unattended (08/28/17 2100)  Total Score: 3 (08/28/17 2100)  Standard Fall Precautions: Yes (08/28/17 2100)  High Fall Risk: Yes (08/17/17 2236)     Speech Assessment:         Ambulation:  Gait  Speed/Lillie: Pace decreased (<100 feet/min); Slow (08/21/17 1000)  Step Length: Left shortened;Right shortened (08/21/17 1000)  Swing Pattern: Right asymmetrical (08/21/17 1000)  Gait Abnormalities: Decreased step clearance;Shuffling gait (R LE narrowed foot placement at IC) (08/21/17 1000)  Distance (ft): 100 Feet (ft) (08/28/17 1600)  Assistive Device: Walker, rolling;Orthotic device (LSO, Ossur foot up device) (08/28/17 1600)  Rail Use: Both (08/28/17 1600)     Visit Vitals    /70    Pulse 68    Temp 98.6 °F (37 °C)    Resp 18    SpO2 96%      Intake and Output:    08/27 0701 - 08/28 1900  In: 12 [P.O.:960]  Out: 975 [Urine:975]    Discharge Exam:  General: Alert and age appropriately oriented. No acute cardio respiratory distress. HEENT: Normocephalic,no scleral icterus  Oral mucosa moist without cyanosis   Lungs: Clear to auscultation  bilaterally. Respiration even and unlabored   Heart: Regular rate and rhythm, S1, S2   No  murmurs, clicks, rub or gallops   Abdomen: Soft, non-tender, nondistended. Bowel sounds present. No organomegaly. Genitourinary: Benign . Neuromuscular:     Hip flexion on the left 5- , on the right 4  DF on the right 2, left 4+  PF on the right 3+ 4-, on the left 5-  No sensory deficits distally. Exam limited by pain. Skin/extremity: No rashes, no erythema. No calf tenderness BLE  Wound healing well       Problem List as of 8/29/2017  Never Reviewed          Codes Class Noted - Resolved    Personal history of DVT (deep vein thrombosis) ICD-10-CM: Z86.718  ICD-9-CM: V12.51  8/21/2017 - Present        * (Principal)Spinal stenosis of lumbar region ICD-10-CM: M48.06  ICD-9-CM: 724.02  8/8/2017 - Present        Spondylolisthesis of lumbar region ICD-10-CM: M43.16  ICD-9-CM: 738.4  8/8/2017 - Present        Spinal stenosis ICD-10-CM: M48.00  ICD-9-CM: 724.00  8/8/2017 - Present        Gastro - esophageal reflux disease (Chronic) ICD-9-CM: 530.81  12/19/2009 - Present        Cellulitis and abscess of trunk ICD-10-CM: L03.319, L02.219  ICD-9-CM: 682.2  12/19/2009 - Present    Overview Signed 12/19/2009 10:48 AM by William Jaquez. Secondary to infected right thoracotomy incision.              Hydropneumothorax ICD-10-CM: J94.8  ICD-9-CM: 511.89  12/19/2009 - Present        Anemia ICD-10-CM: D64.9  ICD-9-CM: 285.9  12/10/2009 - Present        Mycetoma (Chronic) ICD-10-CM: B47.9  ICD-9-CM: 039.9  12/1/2009 - Present        Status Post Partial Lobectomy of Lung-mycetoma ICD-10-CM: Z90.2  ICD-9-CM: V45.89  12/1/2009 - Present Bronchiectasis ([facility] (Chronic) ICD-10-CM: J47.9  ICD-9-CM: 494.0  12/1/2009 - Present        RESOLVED: Fever ICD-10-CM: R50.9  ICD-9-CM: 780.60  12/10/2009 - 12/11/2009        RESOLVED: Renal failure ICD-10-CM: N19  ICD-9-CM: 401  12/10/2009 - 12/14/2009        RESOLVED: Hyponatremia ICD-10-CM: E87.1  ICD-9-CM: 276.1  12/10/2009 - 12/14/2009        RESOLVED: Hypotension ICD-10-CM: I95.9  ICD-9-CM: 458.9  12/10/2009 - 12/11/2009        RESOLVED: Systemic inflammatory response syndrome due to non-infectious process with acute organ dysfunction ([facility] ICD-10-CM: R65.11  ICD-9-CM: 995.94  12/10/2009 - 12/14/2009        RESOLVED: Hypoxemia ICD-10-CM: R09.02  ICD-9-CM: 799.02  12/1/2009 - 12/20/2009              Discharge Instructions:   1. Diet: Regular Diet  2. Activity: No heavy lifting, pushing, pulling more than 10lbs, continue spinal precautions, no driving; continue to wear lumbar brace when out of bed  3. Wound Care: Keep wound clean and dry  Current Discharge Medication List      START taking these medications    Details   cyclobenzaprine (FLEXERIL) 10 mg tablet Take 0.5 Tabs by mouth three (3) times daily as needed for Muscle Spasm(s). Qty: 60 Tab, Refills: 0      !! nebivolol (BYSTOLIC) 5 mg tablet Take 1 Tab by mouth daily. Qty: 30 Tab, Refills: 6      oxyCODONE IR (ROXICODONE) 5 mg immediate release tablet Take 1-3 Tabs by mouth every four (4) hours as needed. Max Daily Amount: 90 mg.  Qty: 80 Tab, Refills: 0      !! traZODone (DESYREL) 50 mg tablet Take 1 Tab by mouth nightly as needed for Sleep. Qty: 30 Tab, Refills: 6      !! warfarin (COUMADIN) 10 mg tablet Take 1 Tab by mouth nightly. Qty: 30 Tab, Refills: 3       !! - Potential duplicate medications found. Please discuss with provider. CONTINUE these medications which have NOT CHANGED    Details   !! warfarin (COUMADIN) 10 mg tablet Take 1 Tab by mouth daily.  Rx Dr Neal Perez PCP--- hx of multiple blood clots and PE following hip replacement (2007) -- Hold for 5 days prior to surgery with permission from Dr Amy Cuellar-- last dose 7/31/17      !! nebivolol (BYSTOLIC) 5 mg tablet Take 5 mg by mouth every morning. Indications: HYPERTENSION      !! traZODone (DESYREL) 50 mg tablet Take 25-50 mg by mouth nightly as needed for Sleep. testosterone cypionate (DEPO-TESTOSTERONE) 200 mg/mL injection by IntraMUSCular route every fourteen (14) days. !! - Potential duplicate medications found. Please discuss with provider. STOP taking these medications       HYDROcodone-acetaminophen (NORCO)  mg tablet Comments:   Reason for Stopping:         enoxaparin (LOVENOX) 40 mg/0.4 mL Comments:   Reason for Stopping:         predniSONE (DELTASONE) 20 mg tablet Comments:   Reason for Stopping:               Rehabilitation Management:   1. Devices: Walkers, Type: Rolling Walker  2.  Consult: Rehab team including PT, OT, recreational therapy, and     Disposition: home with [facility] PT/OT    Follow-up with Dr. Carol Marin in 2 weeks, Dr. Amy Cuellar for INR next week and myself in 4wks    Signed By: Trini Carbajal MD     August 30, 2017

## 2017-08-29 NOTE — PROGRESS NOTES
PHYSICAL THERAPY DAILY NOTE  Time In: 1198  Time Out: 4914  Patient Seen For: PM;Other (see progress notes); Therapeutic exercise;Gait training;Transfer training    Subjective: Patient had no complaints. Objective:  Spinal (back brace on when out of bed, fall precautions)  GROSS ASSESSMENT Daily Assessment            BED/MAT MOBILITY Daily Assessment    Rolling Right : 6 (Modified independent)  Rolling Left : 6 (Modified independent)  Supine to Sit : 6 (Modified independent)  Sit to Supine : 6 (Modified independent)       TRANSFERS Daily Assessment    Transfer Type: SPT with walker  Transfer Assistance : 5 (Stand-by assistance)  Sit to Stand Assistance: Modified independent  Car Transfers: Not tested       GAIT Daily Assessment    Amount of Assistance: 5 (Stand-by assistance)  Distance (ft): 150 Feet (ft)  Assistive Device: Gait belt;Walker, rolling;Brace/Splint (toe up brace)       STEPS or STAIRS Daily Assessment    Steps/Stairs Ambulated (#): 12  Level of Assist : 0 (Not tested)  Rail Use: Both       BALANCE Daily Assessment    Sitting - Static: Good (unsupported)  Sitting - Dynamic: Good (unsupported)  Standing - Static: Fair (with RW)       WHEELCHAIR MOBILITY Daily Assessment    Able to Propel (ft): 150 feet  Functional Level: 6  Curbs/Ramps Assist Required (FIM Score): 0 (Not tested)  Wheelchair Setup Assist Required : 4 (Minimal assistance)  Wheelchair Management: Manages left brake;Manages right brake       LOWER EXTREMITY EXERCISES Daily Assessment    Extremity: Both  Exercise Type #1: Supine lower extremity strengthening  Sets Performed: 1  Reps Performed: 20  Level of Assist: Stand-by assistance          Assessment: Patient making good progress. Plan of Care: Continue with plan of care to reach PT goals. Returned to room with call boles at [facility].     Willian Matias, KATH  8/29/2017

## 2017-08-29 NOTE — PROGRESS NOTES
PHYSICAL THERAPY DISCHARGE SUMMARY  TIME   TIME    Precautions at discharge: Spinal;Other (comment) (LSO on when out of bed, Fall precautions)    Problem List:    Decreased strength B LE  [x]     Decreased strength trunk/core  [x]     Decreased AROM   [x]     Decreased PROM  [x]     Decreased balance sitting  []     Decreased balance standing  [x]     Decreased endurance  [x]     Pain  [x]       Functional Limitations:   Decreased independence with bed mobility  []     Decreased independence with functional transfers  [x]     Decreased independence with ambulation  [x]     Decreased independence with stair negotiation  [x]            Outcome Measures: Vital Signs:  Patient Vitals for the past 12 hrs:   Temp Pulse Resp BP SpO2   08/29/17 0709 97.5 °F (36.4 °C) 61 18 112/73 95 %     Pain level: 2 TO 7 OUT OF 10  Pain location:low back  Pain interventions:Pain medication, rest, positioning,body mechanics    Patient education:Bed mobility training,transfer training, gait training, fall precautions, activity pacing, spinal precautions, body mechanics, Patient verbalizing understanding and demonstrating understanding of patient education.  Recommend follow up education with [facility] PT      Interdisciplinary Communication:spoke with MSW regarding D/C plans       MMT Initial Asssessment   Right Lower Extremity Left Lower Extremity   Hip Flexion 3- 3-   Knee Extension 4 5   Knee Flexion 4- 4   Ankle Dorsiflexion 2- 3-      MMT Discharge Assessment   Right Lower Extremity Left Lower Extremity   Hip Flexion 3 3+   Knee Extension 5 5   Knee Flexion 4- 4   Ankle Dorsiflexion 2 3+   0/5 No palpable muscle contraction  1/5 Palpable muscle contraction, no joint movement  2-/5 Less than full range of motion in gravity eliminated position  2/5 Able to complete full range of motion in gravity eliminated position  2+/5 Able to initiate movement against gravity  3-/5 More than half but not full range of motion against gravity  3/5 Able to complete full range of motion against gravity  3+/5 Completes full range of motion against gravity with minimal resistance  4-/5 Completes full range of motion against gravity with minimal-moderate resistance  4/5 Completes full range of motion against gravity with moderate resistance  4+/5 Completes full range of motion against gravity with moderate-maximum resistance  5/5 Completes full range of motion against gravity with maximum resistance     AROM: Bilateral LE AAROM WFL, right ankle AROM limited due to DF weakness    FIM SCORES Initial Assessment Discharge Assessment   Bed/Chair/Wheelchair Transfers 3 6   Wheelchair Mobility 4 6   Walking [place] 2 5   Steps/Stairs  (NT) 5   PRIMARY MODE OF LOCOMOTION: ambulation  Please see IRC Interdisciplinary Eval: Coordination/Balance Section for details regarding FIM score description. BED/CHAIR/WHEELCHAIR TRANSFERS Initial Assessment Discharge Assessment   Rolling Right 4 (Minimal assistance) 6 (Modified independent)   Rolling Left 4 (Minimal assistance) 6 (Modified independent)   Supine to Sit 4 (Minimal assistance) 6 (Modified independent)   Sit to Stand Minimal assistance Modified independent   Sit to Supine 4 (Minimal assistance) 6 (Modified independent)   Transfer Assist Score 3 6   Transfer Type SPT with walker (assist with sit to stand and stand to sit) SPT with walker   Comments Increased time and effort with cues for body mechanics for log rolling and supine<>sidelying<>sit<>stand. Patient using bed rail for rolling and sidelying<>sit.  Increased back pain during bed mobility and transfers Demonstrating improved body mechanics with log rolling and supine<>sidelying<>sit <>stand   Car Transfer Not tested Not tested   Car Type           WHEELCHAIR MOBILITY/MANAGEMENT Initial Assessment Discharge Assessment   Able to Propel 150 feet 150 feet   Functional Level 4 6   Curbs/ramps assistance required 0 (Not tested) 0 (Not tested) Wheelchair set up assistance required 3 (Moderate assistance) 4 (Minimal assistance)   Wheelchair management Manages left brake, Manages right brake Manages left brake;Manages right brake       WALKING INDEPENDENCE Initial Assessment Discharge Assessment   Assistive device Walker, rolling, Gait belt, Orthotic device (LSO) Walker, rolling;Orthotic device (LSO, [place] foot up device)   Ambulation assistance - level surface 4 (Contact guard assistance) 5 (Stand-by assistance)   Distance 75 Feet (ft) (75ft x 1 w/o DF assist, 25 ft x 1 with DF assist RLE) 150 Feet (ft)   Functional Level 2 5   Comments slow cont partial step through gait pattern with flexed posture. Steppage gait pattern RLE during swing due to foot drop on right, PF right foot at initial contact, decreased ankle PF and knee flexion bilaterally at terminal stance, decreased ankle DF and knee ext LLE at initial contact. slow cont partial step through gait with improved posture and improved step length, step clearance and right ankle DF with knee ext at initial contact. Ambulation assistance - unlevel surface 0 (Not tested) 0 (Not tested)       STEPS/STAIRS Initial Assessment Discharge Assessment   Steps/Stairs ambulated 0 12   Rail Use Both Both   Functional Level  (NT) 5   Comments Unable to ambulate up/down steps at this time due to low back pain, impaired balance and decreased strength RLE  slow single step at a time leading up with LLE and down with RLE.  Increased time and effort to complete demonstrating improved posture and improved step clearance RLE going up steps   Curbs/Ramps 0 (Not tested) 0 (Not tested)     QUALITY INDICATOR ASSIST COMMENTS   Walk 10 feet Supervision with RW    Walk 50 feet with 2 turns Supervision with RW    Walk 150 feet SBA with RW    Walk 10 feet on uneven  NT secondary to impaired balance and LE weakness    1 step/curb SBA with rails    4 steps SBA with rails    12 steps SBA with rails     object Unable secondary to spinal precautions, back pain,LE weakness and impaired balance    Wheel 50' w/2 turns Independent    Wheel 150' Independent             PHYSICAL THERAPY PLAN OF CARE    LTGs: Patient met all revised goals per reassessments. Refer to care plan for details    Pt would benefit from continued skilled physical therapy in order to improve independent functional mobility within the home with use of least restrictive device. Interventions may include range of motion (AROM, PROM B LE/trunk), motor function (B LE/trunk strengthening/coordination), activity tolerance (vitals, oxygen saturation levels), bed mobility training, balance activities, gait training (progressive ambulation program), and functional transfer training. HEP handout:Issued written LE HEP. Able to complete with set up assist and verbal cues, min assist with right ankle DF<>PF  SUPINE EXERCISES Sets Reps Comments   Ankle Pumps 1 20 AAROM for right ankle   Quad Sets 1 20    Heel Slides 1 20    Hip Abduction 1 20    Short Arc Quad 1 20        Pt to be discharged 08/30/17 with assistance provided by girl friend. Family training completed 08/28/17 with patient ahd his girl friend  Therapy Recommendations upon discharge: Ar E Cari Henry needs at discharge: Patient owns a RW, no other DME recommended at this time      Please see IRC; Interdisciplinary Eval, Care Plan, and Patient Education for further information regarding physical therapy discharge summary and plan of care.      Patient remained up in w/c in rehab gym after treatment    Vashti Tatum, PT  8/29/2017

## 2017-08-30 VITALS
DIASTOLIC BLOOD PRESSURE: 73 MMHG | SYSTOLIC BLOOD PRESSURE: 105 MMHG | HEART RATE: 71 BPM | RESPIRATION RATE: 16 BRPM | OXYGEN SATURATION: 96 % | TEMPERATURE: 97.5 F

## 2017-08-30 LAB
FACT VIII ACT/NOR PPP: NORMAL %
FACTOR V LEIDEN MUTATION, XMF5LT: NORMAL %
INR PPP: 2.8 (ref 0.9–1.2)
LUPUS ANTICOAG PANEL, XMLUPT: NORMAL
PROT C AG ACT/NOR PPP IA: NORMAL %
PROT S FREE AG ACT/NOR PPP IA: NORMAL %
PROTHROMBIN TIME: 31.1 SEC (ref 9.6–12)

## 2017-08-30 PROCEDURE — 97116 GAIT TRAINING THERAPY: CPT

## 2017-08-30 PROCEDURE — 36415 COLL VENOUS BLD VENIPUNCTURE: CPT | Performed by: PHYSICAL MEDICINE & REHABILITATION

## 2017-08-30 PROCEDURE — 97530 THERAPEUTIC ACTIVITIES: CPT

## 2017-08-30 PROCEDURE — 97535 SELF CARE MNGMENT TRAINING: CPT

## 2017-08-30 PROCEDURE — 99239 HOSP IP/OBS DSCHRG MGMT >30: CPT | Performed by: PHYSICAL MEDICINE & REHABILITATION

## 2017-08-30 PROCEDURE — 97110 THERAPEUTIC EXERCISES: CPT

## 2017-08-30 PROCEDURE — 85610 PROTHROMBIN TIME: CPT | Performed by: PHYSICAL MEDICINE & REHABILITATION

## 2017-08-30 PROCEDURE — 74011250637 HC RX REV CODE- 250/637: Performed by: PHYSICAL MEDICINE & REHABILITATION

## 2017-08-30 RX ADMIN — OXYCODONE HYDROCHLORIDE 15 MG: 5 TABLET ORAL at 08:38

## 2017-08-30 RX ADMIN — OXYCODONE HYDROCHLORIDE 15 MG: 5 TABLET ORAL at 17:06

## 2017-08-30 RX ADMIN — NEBIVOLOL HYDROCHLORIDE 5 MG: 5 TABLET ORAL at 08:35

## 2017-08-30 RX ADMIN — OXYCODONE HYDROCHLORIDE 15 MG: 5 TABLET ORAL at 12:45

## 2017-08-30 RX ADMIN — FAMOTIDINE 20 MG: 20 TABLET ORAL at 08:35

## 2017-08-30 NOTE — PROGRESS NOTES
[address]  Face to Face Encounter    Patients Name: Raghav Burton    YOB: 1955    Ordering Physician: Julio Smith MD    Primary Diagnosis: Post Spinal Surgery Rehab  Spinal stenosis of lumbar region    Date of Face to Face:   8/30/2017                                  Face to Face Encounter findings are related to primary reason for home care:  Yes.     1. I certify that the patient needs intermittent care as follows: physical therapy: strengthening, gait/stair training, balance training and pt/caregiver education  occupational therapy:  ADL safety (ie. cooking, bathing, dressing) and pt/caregiver education    2. I certify that this patient is homebound, that is: 1) patient requires the use of a walker device, special transportation, or assistance of another to leave the home; or 2) patient's condition makes leaving the home medically contraindicated; and 3) patient has a normal inability to leave the home and leaving the home requires considerable and taxing effort. Patient may leave the home for infrequent and short duration for medical reasons, and occasional absences for non-medical reasons. Homebound status is due to the following functional limitations: Limited ambulation secondary to spinal and fall precautions, activity tolerance and balance. Ambulation limited to 170 feet with the use of a RW (assistive device). Patient with strength deficits limiting the performance of all ADL's without caregiver assistance or the use of an assistive device. 3. I certify that this patient is under my care and that I, or a nurse practitioner or [facility], or clinical nurse specialist, or certified nurse midwife, working with me, had a Face-to-Face Encounter that meets the physician Face-to-Face Encounter requirements.   The following are the clinical findings from the [address] [place] [address] encounter that support the need for skilled services and is a summary of the encounter: see medical record    See attached progess note      Joellyn Bloch, MSW  8/30/2017      THE FOLLOWING TO BE COMPLETED BY THE COMMUNITY PHYSICIAN:    I concur with the findings described above from the F2F encounter that this patient is homebound and in need of a skilled service.     Certifying Physician: _____________________________________      Printed Certifying Physician Name: _____________________________________    Date: _________________

## 2017-08-30 NOTE — PROGRESS NOTES
Problem: Falls - Risk of  Goal: *Absence of Falls  Document Erick Fall Risk and appropriate interventions in the flowsheet.    Outcome: Progressing Towards Goal  Fall Risk Interventions:  Mobility Interventions: Patient to call before getting OOB     Mentation Interventions: Door open when patient unattended     Medication Interventions: Patient to call before getting OOB     Elimination Interventions: Patient to call for help with toileting needs     History of Falls Interventions: Door open when patient unattended

## 2017-08-30 NOTE — PROGRESS NOTES
Time In 0742   Time Out 0830     Mobility   Score Comments   Supine to Sit 6 (Modified independent)    Transfer Assist 6 Transfer Type: SPT with walker  Comments: RW     Activities of Daily Living    Score Comments   Self-Feeding 7 Independent   Bathing 6 Body Parts Bathe: Abdomen, Arm, left, Arm, right, Buttocks, Chest, Lower leg and foot, left, Lower leg and foot, right, [place] area, Thigh, left, Thigh, right  Comments: LHS   Tub/Shower Transfer [place] 6 Type of Shower: Shower  Adaptive  Equipment:Tub transfer bench, Grab bars and Walker  Comments: RW   Upper Body  Dressing/  Undressing 6 Items Applied:Pullover (4 steps)  Comments: RW to gather clothes   Lower Body Dressing/  Undressing 6 Items Applied:Shoe, left (1 step), Shoe, right (1 step), Sock, right (1 step), Sock, left (1 step), Elastic waist pants (3 steps)  Adaptive Equipment: Reacher, sock aide, foot funnel, elastic shoelace, and RW  Comments: AE     Pt was in bed and agreeable to tx. Pt's performance with ADL is reflected in above chart. Pt was left in recliner with all needs within reach. Continue with POC.      Reyes HENDRIX/STANLEY  8/30/2017

## 2017-08-30 NOTE — PROGRESS NOTES
PHYSICAL THERAPY DAILY NOTE  Time In: 1116  Time Out: 4665  Patient Seen For: AM;Transfer training;Gait training; Therapeutic exercise; Other (see progress notes)    Subjective: Patient had no complaints. Objective: No pain noted. Spinal (back brace on when out of bed, fall precautions)  GROSS ASSESSMENT Daily Assessment            BED/MAT MOBILITY Daily Assessment    Supine to Sit : 6 (Modified independent)  Sit to Supine : 6 (Modified independent)       TRANSFERS Daily Assessment    Transfer Type: SPT with walker  Transfer Assistance : Modified Independent  Sit to Stand Assistance: Modified independent       GAIT Daily Assessment    Amount of Assistance: Modified Independent  Distance (ft): 150 Feet (ft)  Assistive Device: Gait belt;Walker, rolling;Brace/Splint       STEPS or STAIRS Daily Assessment    Level of Assist : 0 (Not tested)       BALANCE Daily Assessment            WHEELCHAIR MOBILITY Daily Assessment            LOWER EXTREMITY EXERCISES Daily Assessment    Extremity: Both  Exercise Type #1: Supine lower extremity strengthening  Sets Performed: 2  Reps Performed: 10  Level of Assist: Stand-by assistance          Assessment: Patient making good progress. Plan of Care: Continue with plan of care to reach PT goals. Returned to room with call bell at reach.     Marcelino Cabrera, KATH  8/30/2017

## 2017-08-30 NOTE — PROGRESS NOTES
Discharge instructions completed with patient. Follow up appointments and prescriptions completed with patient. Patient shows verbal understanding of discharge instructions. No other verbalized needs made known at present time.

## 2017-08-30 NOTE — PROGRESS NOTES
Report received on pts history and status from [facility] RN> pt sitting up in bed watching TV on telephone with family member. Head to toe assessment. Pt denies any discomfort at this time. Rates pain a 0 on 1-10 scale.

## 2017-08-30 NOTE — PROGRESS NOTES
Problem: Falls - Risk of  Goal: *Absence of Falls  Document Erick Fall Risk and appropriate interventions in the flowsheet.    Outcome: Progressing Towards Goal  Fall Risk Interventions:  Mobility Interventions: Communicate number of staff needed for ambulation/transfer, PT Consult for mobility concerns, PT Consult for assist device competence, Patient to call before getting OOB, OT consult for ADLs     Mentation Interventions: Adequate sleep, hydration, pain control, Evaluate medications/consider consulting pharmacy, Increase mobility     Medication Interventions: Evaluate medications/consider consulting pharmacy, Patient to call before getting OOB     Elimination Interventions: Patient to call for help with toileting needs, Toileting schedule/hourly rounds, Toilet paper/wipes in reach, Call light in reach     History of Falls Interventions: Consult care management for discharge planning, Utilize gait belt for transfer/ambulation

## 2017-08-30 NOTE — PROGRESS NOTES
Care Management Interventions  PCP Verified by CM: Yes  Mode of Transport at Discharge: Other (see comment) (friends car)  Transition of Care Consult (CM Consult): Home Health (PT and OT)  [facility] - INPATIENT: Yes  Discharge Durable Medical Equipment: No (Already has a RW at home)  Physical Therapy Consult: Yes  Occupational Therapy Consult: Yes  Speech Therapy Consult: No  Current Support Network: Lives Alone, Own Home, Other (Girlfriend in area)  Confirm Follow Up Transport: Friends  Plan discussed with Pt/Family/Caregiver: Yes  Freedom of Choice Offered: Yes  Discharge Location  Discharge Placement: Home with home health [facility]    Pt to discharge to 's home today with [place] PT and OT. Services to be provided by [facility].   Info entered in [employer].

## 2017-08-30 NOTE — PROGRESS NOTES
PHYSICAL THERAPY DAILY NOTE  Time In: 1300  Time Out: 8746  Patient Seen For: PM;Other (see progress notes); Therapeutic exercise;Gait training;Transfer training    Subjective: Patient had no complaints. Objective: No pain noted. Spinal (back brace on when out of bed, fall precautions)  GROSS ASSESSMENT Daily Assessment            BED/MAT MOBILITY Daily Assessment    Supine to Sit : 6 (Modified independent)  Sit to Supine : 6 (Modified independent)       TRANSFERS Daily Assessment    Transfer Type: SPT with walker  Transfer Assistance : 6 (Modified independent)  Sit to Stand Assistance: Modified independent       GAIT Daily Assessment    Amount of Assistance: 6 (Modified independent)  Distance (ft): 170 Feet (ft)  Assistive Device: Walker, rolling       STEPS or STAIRS Daily Assessment    Level of Assist : 0 (Not tested)       BALANCE Daily Assessment            WHEELCHAIR MOBILITY Daily Assessment            LOWER EXTREMITY EXERCISES Daily Assessment    Extremity: Both  Exercise Type #1: Supine lower extremity strengthening  Sets Performed: 2  Reps Performed: 10  Level of Assist: Stand-by assistance          Assessment: Patient making good progress. Plan of Care: Continue with plan of care to reach PT goals. Returned to room with call boles at reach.     Florence De, KATH  8/30/2017

## 2017-08-31 ENCOUNTER — HOME CARE VISIT (OUTPATIENT)
Dept: SCHEDULING | Facility: HOME HEALTH | Age: 62
End: 2017-08-31
Payer: COMMERCIAL

## 2017-08-31 VITALS
WEIGHT: 240 LBS | TEMPERATURE: 98.6 F | HEIGHT: 72 IN | SYSTOLIC BLOOD PRESSURE: 130 MMHG | BODY MASS INDEX: 32.51 KG/M2 | HEART RATE: 82 BPM | DIASTOLIC BLOOD PRESSURE: 82 MMHG | RESPIRATION RATE: 18 BRPM

## 2017-08-31 PROCEDURE — 400013 HH SOC

## 2017-08-31 PROCEDURE — G0151 HHCP-SERV OF PT,EA 15 MIN: HCPCS

## 2017-08-31 NOTE — PROGRESS NOTES
Pt D/C summary completed on 8/31/17. Please see for specifics in [address] Patient d/c'd to home on 8/30/17.   Suzette Lamb, CTRS

## 2017-09-01 ENCOUNTER — HOME CARE VISIT (OUTPATIENT)
Dept: SCHEDULING | Facility: HOME HEALTH | Age: 62
End: 2017-09-01
Payer: COMMERCIAL

## 2017-09-01 VITALS
SYSTOLIC BLOOD PRESSURE: 118 MMHG | RESPIRATION RATE: 16 BRPM | DIASTOLIC BLOOD PRESSURE: 80 MMHG | HEART RATE: 72 BPM | OXYGEN SATURATION: 98 % | TEMPERATURE: 98 F

## 2017-09-01 PROCEDURE — G0152 HHCP-SERV OF OT,EA 15 MIN: HCPCS

## 2017-09-05 ENCOUNTER — HOME CARE VISIT (OUTPATIENT)
Dept: SCHEDULING | Facility: HOME HEALTH | Age: 62
End: 2017-09-05
Payer: COMMERCIAL

## 2017-09-05 PROCEDURE — G0157 HHC PT ASSISTANT EA 15: HCPCS

## 2017-09-06 VITALS
RESPIRATION RATE: 18 BRPM | TEMPERATURE: 97.8 F | SYSTOLIC BLOOD PRESSURE: 102 MMHG | DIASTOLIC BLOOD PRESSURE: 58 MMHG | HEART RATE: 76 BPM

## 2017-09-08 ENCOUNTER — HOME CARE VISIT (OUTPATIENT)
Dept: SCHEDULING | Facility: HOME HEALTH | Age: 62
End: 2017-09-08
Payer: COMMERCIAL

## 2017-09-08 PROCEDURE — G0157 HHC PT ASSISTANT EA 15: HCPCS

## 2017-09-11 ENCOUNTER — HOME CARE VISIT (OUTPATIENT)
Dept: SCHEDULING | Facility: HOME HEALTH | Age: 62
End: 2017-09-11
Payer: COMMERCIAL

## 2017-09-11 PROCEDURE — G0151 HHCP-SERV OF PT,EA 15 MIN: HCPCS

## 2017-09-12 ENCOUNTER — HOME CARE VISIT (OUTPATIENT)
Dept: HOME HEALTH SERVICES | Facility: HOME HEALTH | Age: 62
End: 2017-09-12
Payer: COMMERCIAL

## 2017-09-12 VITALS
SYSTOLIC BLOOD PRESSURE: 108 MMHG | HEART RATE: 68 BPM | RESPIRATION RATE: 18 BRPM | DIASTOLIC BLOOD PRESSURE: 78 MMHG | TEMPERATURE: 97.2 F

## 2017-09-13 VITALS
DIASTOLIC BLOOD PRESSURE: 74 MMHG | RESPIRATION RATE: 19 BRPM | OXYGEN SATURATION: 98 % | SYSTOLIC BLOOD PRESSURE: 124 MMHG

## 2017-09-15 ENCOUNTER — HOME CARE VISIT (OUTPATIENT)
Dept: SCHEDULING | Facility: HOME HEALTH | Age: 62
End: 2017-09-15
Payer: COMMERCIAL

## 2017-09-15 PROCEDURE — G0151 HHCP-SERV OF PT,EA 15 MIN: HCPCS

## 2017-09-16 ENCOUNTER — HOME CARE VISIT (OUTPATIENT)
Dept: HOME HEALTH SERVICES | Facility: HOME HEALTH | Age: 62
End: 2017-09-16
Payer: COMMERCIAL

## 2017-09-19 ENCOUNTER — HOME CARE VISIT (OUTPATIENT)
Dept: SCHEDULING | Facility: HOME HEALTH | Age: 62
End: 2017-09-19
Payer: COMMERCIAL

## 2017-09-19 VITALS
OXYGEN SATURATION: 98 % | DIASTOLIC BLOOD PRESSURE: 84 MMHG | RESPIRATION RATE: 16 BRPM | HEART RATE: 84 BPM | SYSTOLIC BLOOD PRESSURE: 132 MMHG

## 2017-09-19 VITALS
HEART RATE: 76 BPM | TEMPERATURE: 97.1 F | DIASTOLIC BLOOD PRESSURE: 72 MMHG | SYSTOLIC BLOOD PRESSURE: 120 MMHG | RESPIRATION RATE: 18 BRPM

## 2017-09-19 PROCEDURE — G0157 HHC PT ASSISTANT EA 15: HCPCS

## 2017-09-22 ENCOUNTER — HOME CARE VISIT (OUTPATIENT)
Dept: SCHEDULING | Facility: HOME HEALTH | Age: 62
End: 2017-09-22
Payer: COMMERCIAL

## 2017-09-22 PROCEDURE — G0157 HHC PT ASSISTANT EA 15: HCPCS

## 2017-09-24 VITALS
RESPIRATION RATE: 18 BRPM | SYSTOLIC BLOOD PRESSURE: 122 MMHG | HEART RATE: 80 BPM | TEMPERATURE: 97.1 F | DIASTOLIC BLOOD PRESSURE: 78 MMHG

## 2017-09-25 ENCOUNTER — HOME CARE VISIT (OUTPATIENT)
Dept: SCHEDULING | Facility: HOME HEALTH | Age: 62
End: 2017-09-25
Payer: COMMERCIAL

## 2017-09-25 VITALS
TEMPERATURE: 97.4 F | RESPIRATION RATE: 18 BRPM | HEART RATE: 64 BPM | DIASTOLIC BLOOD PRESSURE: 70 MMHG | SYSTOLIC BLOOD PRESSURE: 110 MMHG

## 2017-09-25 PROCEDURE — G0157 HHC PT ASSISTANT EA 15: HCPCS

## 2017-09-26 ENCOUNTER — HOME CARE VISIT (OUTPATIENT)
Dept: HOME HEALTH SERVICES | Facility: HOME HEALTH | Age: 62
End: 2017-09-26
Payer: COMMERCIAL

## 2017-09-29 ENCOUNTER — HOME CARE VISIT (OUTPATIENT)
Dept: SCHEDULING | Facility: HOME HEALTH | Age: 62
End: 2017-09-29
Payer: COMMERCIAL

## 2017-10-03 ENCOUNTER — HOME CARE VISIT (OUTPATIENT)
Dept: HOME HEALTH SERVICES | Facility: HOME HEALTH | Age: 62
End: 2017-10-03
Payer: COMMERCIAL

## 2017-10-06 ENCOUNTER — HOME CARE VISIT (OUTPATIENT)
Dept: SCHEDULING | Facility: HOME HEALTH | Age: 62
End: 2017-10-06
Payer: COMMERCIAL

## 2017-10-06 PROCEDURE — G0151 HHCP-SERV OF PT,EA 15 MIN: HCPCS

## 2017-10-08 VITALS
HEART RATE: 77 BPM | SYSTOLIC BLOOD PRESSURE: 128 MMHG | OXYGEN SATURATION: 99 % | DIASTOLIC BLOOD PRESSURE: 74 MMHG | RESPIRATION RATE: 16 BRPM

## 2018-03-04 NOTE — H&P
Bharath Love  History and physical    Subjective  Problem List:.   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 Fernandez. 2) Left hip pain/DJD. 3) Left hip bursitis. 4) History of DVT/PE (2007). 5) Bilateral shoulder pain/impingement. 6) Right knee pain/contusion. 7) right elbow pain. 8)  Left shoulder pain/RCT. This 63year old male presents today for evaluation of left shoulder pain/RCT. The patient comes in for evaluation, history and physical, and surgical consent signing. The surgical procedure was reviewed in detail with the patient. The risks, including but not limited to anesthesia, infection, deep vein thrombosis, pulmonary embolus, injury to vessels, tendons and nerves, paralysis, stroke, heart attack, loss of limb, and death were discussed. The patient understands the post operative course and all questions were answered. No guarantees are made and all alternatives are given. The patient wishes to proceed with the surgery. Appropriate literature and relevant material was reviewed with the patient. Surgical procedure: left shoulder arthroscopy with rotator cuff repair. .       Known allergies: NKDA. Ongoing medical problems: history of DVT and subsequent PE; GERD; testicular hypofunction; hyperplasia of prostate; anxiety. Major events: Right EJ 2007; pulmonary embolism right lung 2007; hiatal hernia repair 2008; right leg DVT; bronchoscopy; colonoscopy; right thoracotomy 2007; right shoulder arthroscopy with RCR, acromioplasty and labral debridement 12/17/13, back surgery-2017. .     Family medical history: diabetes (father). Social history: Denies Tobacco use; EtOH use described as occasional.     REVIEW OF SYSTEMS:.     General: Denies weight change, generally healthy,  change in strength or exercise tolerance. .     Head: Denies headaches,  vertigo,  injury. .     Eyes: Denies changes in vision, denies diplopia,  tearing, scotomata,  pain.      Ears:  Denies change in hearing,  tinnitus, bleeding, vertigo. .     Nose: Denies epistaxis,  coryza, obstruction,  discharge. .     Mouth: Denies dental difficulties,  gingival bleeding,  use of dentures. .     Chest: Denies dyspnea, wheezing, hemoptysis, cough. .     Heart: Denies chest pains,  palpitations, syncope,  orthopnea. .     Abdomen: Denies change in appetite,  dysphagia, abdominal pains, bowel habit changes, emesis, melena. .     : Denies urinary urgency,  dysuria, change in nature of urine. .     Musculoskeletal: left shoulder pain. Neurologic: Denies weakness, denies tremor,  seizures, changes in mentation,  ataxia. Psychiatric: Denies depressive symptoms, changes in sleep habits,  changes in thought content. .       Objective  Patient is a 63year old male who appears his given age and is in no apparent distress. Oriented to person, place and time. Mood and affect are normal and appropriate to the situation. Assessment of respiratory effort reveals even and nonlabored respirations. .     Vital signs:  Height 72 in  Weight 243 lb  BMI 32.96  /107 mmHg  Temp 97.5 °F  HR: 71 bpm  RR 16 bpm  O2 sat 95 %    GEN: NAD. Mo Jews Lungs clear to auscultation bilaterally. Heart rate regular without murmur heard. .     The patient exhibits antalgic reciprocal gait. The patient was able to climb up and down off the examination table. Reviewed left shoulder MRI performed on 11/14/17 at [address] [place] [address]. Impression:.     1. Full thickness tear of the supraspinatus and infraspinatus tendons with marked reactive myositis of the infraspinatus greater than supraspinatus muscle bellies as well as muscle belly atrophy. 2. High-grade tear of the subscapularis with muscle belly atrophy. 3. Bicipital tendon rupture. 4. No detached labral tear identified. 5. Reactive fluid in the subachromial-subdeltoid bursa and glenohumeral joint. 6. Mild reactive subchondral marrow edema and fliud in the [facility] joint.        Left Shoulder Examination:. Inspection reveals no external signs of injury or acute trauma. .     No warmth or erythema noted. Palpation reveals diffuse tenderness over the left shoulder, [facility] joint and lateral subacromial aspect. Shoulder abduction (active): full, with moderate pain past 90 degrees. Shoulder forward flexion (active): full, with moderate pain past 90 degrees. No objective shoulder instability noted. Muscle tone is normal, without evidence of atrophy noted. Muscle strength is normal.     Neurologic: Sensation is intact and symmetrical in all dermatomes upper extremities. .     Vascular: Peripheral pulses normal 2/2 upper extremities. .       Assessment    DIAGNOSIS:        Pain in joint involving shoulder region [ICD-10: M25.512], [ICD-9: 719.41], [SNOMED: 557299343]        Complete rotator cuff tear of left shoulder, not specified as traumatic [ICD-10: [place], [ICD-9: 727.61], [SNOMED: 489624484]        Preop examination [ICD-10: H02.320], [ICD-9: V72.84], [SNOMED: 196851454]    Plan  We discussed the pathophysiology of the diagnosis and options for treatment. .   Discussed with patient the surgical risks. We have talked about the possibility of complications of surgical procedure (left shoulder arthroscopy with rotator cuff repair), including the possibility of damage to nerves, arteries, vessels and tendons, bleeding, infection, the possibility that the patient may sustain medical problems, even death. We have talked about the possibility that the condition may not improve after surgery or that it could actually be worse. Patient seems to understand and accept these possible complications. .    Surgical consent and pre-op labs were obtained today. The patient states that he stopped taking his Warfarin today. We have obtained clearance from Dr. Elliot Martinez offic    All questions answered at this time.  Patient knows to contact the office with any questions or concerns. .      VERIFICATION OF ANCILLARY DOCUMENTATION:  The portions of the chart completed by ancillary personnel were reviewed by the physician. RTC: post-op.     MEDICATIONS:        TraZODone Hydrochloride 50 mg oral tablet 1/2 - 1 PO q HS  (stop date: 2/28/2018)                    prescription:   discontinued during this visit        Depo-Testosterone cypionate 200 mg/mL intramuscular solution inject IM once monthly                    prescription:   not prescribed during this visit        Warfarin Sodium 10 mg oral tablet 1 PO qd                    prescription:   not prescribed during this visit

## 2018-03-05 ENCOUNTER — ANESTHESIA EVENT (OUTPATIENT)
Dept: SURGERY | Age: 63
End: 2018-03-05
Payer: COMMERCIAL

## 2018-03-06 ENCOUNTER — HOSPITAL ENCOUNTER (OUTPATIENT)
Age: 63
Setting detail: OUTPATIENT SURGERY
Discharge: HOME | End: 2018-03-06
Attending: ORTHOPAEDIC SURGERY | Admitting: ORTHOPAEDIC SURGERY
Payer: COMMERCIAL

## 2018-03-06 ENCOUNTER — ANESTHESIA (OUTPATIENT)
Dept: SURGERY | Age: 63
End: 2018-03-06
Payer: COMMERCIAL

## 2018-03-06 VITALS
WEIGHT: 249 LBS | DIASTOLIC BLOOD PRESSURE: 82 MMHG | SYSTOLIC BLOOD PRESSURE: 144 MMHG | HEART RATE: 76 BPM | TEMPERATURE: 97.6 F | BODY MASS INDEX: 33 KG/M2 | OXYGEN SATURATION: 91 % | RESPIRATION RATE: 14 BRPM | HEIGHT: 73 IN

## 2018-03-06 LAB
INR BLD: 1 (ref 0.9–1.2)
PT BLD: 11.8 SECS (ref 9.6–11.6)

## 2018-03-06 PROCEDURE — 77030008703 HC TU ET UNCUF COVD -A: Performed by: ANESTHESIOLOGY

## 2018-03-06 PROCEDURE — 77030006891 HC BLD SHV RESECT STRY -B: Performed by: ORTHOPAEDIC SURGERY

## 2018-03-06 PROCEDURE — 77030033269 HC SLV COMPR SCD KNE2 CARD -B: Performed by: ORTHOPAEDIC SURGERY

## 2018-03-06 PROCEDURE — 76010000161 HC OR TIME 1 TO 1.5 HR INTENSV-TIER 1: Performed by: ORTHOPAEDIC SURGERY

## 2018-03-06 PROCEDURE — 74011250636 HC RX REV CODE- 250/636

## 2018-03-06 PROCEDURE — 77030003602 HC NDL NRV BLK BBMI -B: Performed by: ANESTHESIOLOGY

## 2018-03-06 PROCEDURE — 74011000250 HC RX REV CODE- 250

## 2018-03-06 PROCEDURE — L3670 SO ACRO/CLAV CAN WEB PRE OTS: HCPCS | Performed by: ORTHOPAEDIC SURGERY

## 2018-03-06 PROCEDURE — 77030008467 HC STPLR SKN COVD -B: Performed by: ORTHOPAEDIC SURGERY

## 2018-03-06 PROCEDURE — 74011250636 HC RX REV CODE- 250/636: Performed by: ANESTHESIOLOGY

## 2018-03-06 PROCEDURE — 85610 PROTHROMBIN TIME: CPT

## 2018-03-06 PROCEDURE — 74011250636 HC RX REV CODE- 250/636: Performed by: ORTHOPAEDIC SURGERY

## 2018-03-06 PROCEDURE — 77030016492 HC BIT DRL1 ZIMM -C: Performed by: ORTHOPAEDIC SURGERY

## 2018-03-06 PROCEDURE — 77030018836 HC SOL IRR NACL ICUM -A: Performed by: ORTHOPAEDIC SURGERY

## 2018-03-06 PROCEDURE — 77030011640 HC PAD GRND REM COVD -A: Performed by: ORTHOPAEDIC SURGERY

## 2018-03-06 PROCEDURE — 77030002982 HC SUT POLYSRB J&J -A: Performed by: ORTHOPAEDIC SURGERY

## 2018-03-06 PROCEDURE — 74011250637 HC RX REV CODE- 250/637: Performed by: ANESTHESIOLOGY

## 2018-03-06 PROCEDURE — 76210000020 HC REC RM PH II FIRST 0.5 HR: Performed by: ORTHOPAEDIC SURGERY

## 2018-03-06 PROCEDURE — 76060000033 HC ANESTHESIA 1 TO 1.5 HR: Performed by: ORTHOPAEDIC SURGERY

## 2018-03-06 PROCEDURE — 76210000063 HC OR PH I REC FIRST 0.5 HR: Performed by: ORTHOPAEDIC SURGERY

## 2018-03-06 PROCEDURE — C1713 ANCHOR/SCREW BN/BN,TIS/BN: HCPCS | Performed by: ORTHOPAEDIC SURGERY

## 2018-03-06 PROCEDURE — 76942 ECHO GUIDE FOR BIOPSY: CPT | Performed by: ORTHOPAEDIC SURGERY

## 2018-03-06 PROCEDURE — 77030016570 HC BLNKT BAIR HGGR 3M -B: Performed by: ANESTHESIOLOGY

## 2018-03-06 PROCEDURE — 77030008477 HC STYL SATN SLP COVD -A: Performed by: ANESTHESIOLOGY

## 2018-03-06 PROCEDURE — 77030002916 HC SUT ETHLN J&J -A: Performed by: ORTHOPAEDIC SURGERY

## 2018-03-06 PROCEDURE — 77030019605: Performed by: ORTHOPAEDIC SURGERY

## 2018-03-06 PROCEDURE — 77030004453 HC BUR SHV STRY -B: Performed by: ORTHOPAEDIC SURGERY

## 2018-03-06 PROCEDURE — 76010010054 HC POST OP PAIN BLOCK: Performed by: ORTHOPAEDIC SURGERY

## 2018-03-06 PROCEDURE — 77030027384 HC PRB ARTHSCP SERFAS STRY -C: Performed by: ORTHOPAEDIC SURGERY

## 2018-03-06 DEVICE — ANCHOR SUT W/TAPERED NDL 2.9MM --: Type: IMPLANTABLE DEVICE | Site: SHOULDER | Status: FUNCTIONAL

## 2018-03-06 RX ORDER — DEXAMETHASONE SODIUM PHOSPHATE 4 MG/ML
INJECTION, SOLUTION INTRA-ARTICULAR; INTRALESIONAL; INTRAMUSCULAR; INTRAVENOUS; SOFT TISSUE AS NEEDED
Status: DISCONTINUED | OUTPATIENT
Start: 2018-03-06 | End: 2018-03-06 | Stop reason: HOSPADM

## 2018-03-06 RX ORDER — SODIUM CHLORIDE 0.9 % (FLUSH) 0.9 %
5-10 SYRINGE (ML) INJECTION AS NEEDED
Status: DISCONTINUED | OUTPATIENT
Start: 2018-03-06 | End: 2018-03-06 | Stop reason: HOSPADM

## 2018-03-06 RX ORDER — MIDAZOLAM HYDROCHLORIDE 1 MG/ML
2 INJECTION, SOLUTION INTRAMUSCULAR; INTRAVENOUS
Status: DISCONTINUED | OUTPATIENT
Start: 2018-03-06 | End: 2018-03-06 | Stop reason: HOSPADM

## 2018-03-06 RX ORDER — LIDOCAINE HYDROCHLORIDE 10 MG/ML
0.1 INJECTION INFILTRATION; PERINEURAL AS NEEDED
Status: DISCONTINUED | OUTPATIENT
Start: 2018-03-06 | End: 2018-03-06 | Stop reason: HOSPADM

## 2018-03-06 RX ORDER — SODIUM CHLORIDE 0.9 % (FLUSH) 0.9 %
5-10 SYRINGE (ML) INJECTION EVERY 8 HOURS
Status: DISCONTINUED | OUTPATIENT
Start: 2018-03-06 | End: 2018-03-06 | Stop reason: HOSPADM

## 2018-03-06 RX ORDER — FAMOTIDINE 20 MG/1
20 TABLET, FILM COATED ORAL ONCE
Status: COMPLETED | OUTPATIENT
Start: 2018-03-06 | End: 2018-03-06

## 2018-03-06 RX ORDER — ACETAMINOPHEN 500 MG
1000 TABLET ORAL
Status: DISCONTINUED | OUTPATIENT
Start: 2018-03-06 | End: 2018-03-06 | Stop reason: HOSPADM

## 2018-03-06 RX ORDER — EPINEPHRINE 1 MG/ML
INJECTION, SOLUTION, CONCENTRATE INTRAVENOUS AS NEEDED
Status: DISCONTINUED | OUTPATIENT
Start: 2018-03-06 | End: 2018-03-06 | Stop reason: HOSPADM

## 2018-03-06 RX ORDER — CEFAZOLIN SODIUM/WATER 2 G/20 ML
2 SYRINGE (ML) INTRAVENOUS ONCE
Status: COMPLETED | OUTPATIENT
Start: 2018-03-06 | End: 2018-03-06

## 2018-03-06 RX ORDER — SUCCINYLCHOLINE CHLORIDE 20 MG/ML
INJECTION INTRAMUSCULAR; INTRAVENOUS AS NEEDED
Status: DISCONTINUED | OUTPATIENT
Start: 2018-03-06 | End: 2018-03-06 | Stop reason: HOSPADM

## 2018-03-06 RX ORDER — ONDANSETRON 2 MG/ML
INJECTION INTRAMUSCULAR; INTRAVENOUS AS NEEDED
Status: DISCONTINUED | OUTPATIENT
Start: 2018-03-06 | End: 2018-03-06 | Stop reason: HOSPADM

## 2018-03-06 RX ORDER — GLYCOPYRROLATE 0.2 MG/ML
INJECTION INTRAMUSCULAR; INTRAVENOUS AS NEEDED
Status: DISCONTINUED | OUTPATIENT
Start: 2018-03-06 | End: 2018-03-06 | Stop reason: HOSPADM

## 2018-03-06 RX ORDER — ROCURONIUM BROMIDE 10 MG/ML
INJECTION, SOLUTION INTRAVENOUS AS NEEDED
Status: DISCONTINUED | OUTPATIENT
Start: 2018-03-06 | End: 2018-03-06 | Stop reason: HOSPADM

## 2018-03-06 RX ORDER — SODIUM CHLORIDE 9 MG/ML
50 INJECTION, SOLUTION INTRAVENOUS CONTINUOUS
Status: DISCONTINUED | OUTPATIENT
Start: 2018-03-06 | End: 2018-03-06 | Stop reason: HOSPADM

## 2018-03-06 RX ORDER — HYDROCODONE BITARTRATE AND ACETAMINOPHEN 5; 325 MG/1; MG/1
1 TABLET ORAL AS NEEDED
Status: DISCONTINUED | OUTPATIENT
Start: 2018-03-06 | End: 2018-03-06 | Stop reason: HOSPADM

## 2018-03-06 RX ORDER — FENTANYL CITRATE 50 UG/ML
100 INJECTION, SOLUTION INTRAMUSCULAR; INTRAVENOUS ONCE
Status: DISCONTINUED | OUTPATIENT
Start: 2018-03-06 | End: 2018-03-06 | Stop reason: HOSPADM

## 2018-03-06 RX ORDER — HYDROMORPHONE HYDROCHLORIDE 2 MG/ML
0.5 INJECTION, SOLUTION INTRAMUSCULAR; INTRAVENOUS; SUBCUTANEOUS
Status: DISCONTINUED | OUTPATIENT
Start: 2018-03-06 | End: 2018-03-06 | Stop reason: HOSPADM

## 2018-03-06 RX ORDER — NEOSTIGMINE METHYLSULFATE 1 MG/ML
INJECTION INTRAVENOUS AS NEEDED
Status: DISCONTINUED | OUTPATIENT
Start: 2018-03-06 | End: 2018-03-06 | Stop reason: HOSPADM

## 2018-03-06 RX ORDER — LIDOCAINE HYDROCHLORIDE 20 MG/ML
INJECTION, SOLUTION EPIDURAL; INFILTRATION; INTRACAUDAL; PERINEURAL AS NEEDED
Status: DISCONTINUED | OUTPATIENT
Start: 2018-03-06 | End: 2018-03-06 | Stop reason: HOSPADM

## 2018-03-06 RX ORDER — SODIUM CHLORIDE, SODIUM LACTATE, POTASSIUM CHLORIDE, CALCIUM CHLORIDE 600; 310; 30; 20 MG/100ML; MG/100ML; MG/100ML; MG/100ML
150 INJECTION, SOLUTION INTRAVENOUS CONTINUOUS
Status: DISCONTINUED | OUTPATIENT
Start: 2018-03-06 | End: 2018-03-06 | Stop reason: HOSPADM

## 2018-03-06 RX ORDER — PROPOFOL 10 MG/ML
INJECTION, EMULSION INTRAVENOUS AS NEEDED
Status: DISCONTINUED | OUTPATIENT
Start: 2018-03-06 | End: 2018-03-06 | Stop reason: HOSPADM

## 2018-03-06 RX ADMIN — NEOSTIGMINE METHYLSULFATE 3 MG: 1 INJECTION INTRAVENOUS at 08:52

## 2018-03-06 RX ADMIN — Medication 2 G: at 07:32

## 2018-03-06 RX ADMIN — FAMOTIDINE 20 MG: 20 TABLET, FILM COATED ORAL at 06:10

## 2018-03-06 RX ADMIN — PROPOFOL 200 MG: 10 INJECTION, EMULSION INTRAVENOUS at 07:40

## 2018-03-06 RX ADMIN — ROCURONIUM BROMIDE 20 MG: 10 INJECTION, SOLUTION INTRAVENOUS at 07:58

## 2018-03-06 RX ADMIN — SODIUM CHLORIDE, SODIUM LACTATE, POTASSIUM CHLORIDE, AND CALCIUM CHLORIDE 150 ML/HR: 600; 310; 30; 20 INJECTION, SOLUTION INTRAVENOUS at 06:10

## 2018-03-06 RX ADMIN — GLYCOPYRROLATE 0.4 MG: 0.2 INJECTION INTRAMUSCULAR; INTRAVENOUS at 08:52

## 2018-03-06 RX ADMIN — ROCURONIUM BROMIDE 10 MG: 10 INJECTION, SOLUTION INTRAVENOUS at 07:40

## 2018-03-06 RX ADMIN — SUCCINYLCHOLINE CHLORIDE 160 MG: 20 INJECTION INTRAMUSCULAR; INTRAVENOUS at 07:40

## 2018-03-06 RX ADMIN — MIDAZOLAM HYDROCHLORIDE 2 MG: 1 INJECTION, SOLUTION INTRAMUSCULAR; INTRAVENOUS at 06:52

## 2018-03-06 RX ADMIN — DEXAMETHASONE SODIUM PHOSPHATE 10 MG: 4 INJECTION, SOLUTION INTRA-ARTICULAR; INTRALESIONAL; INTRAMUSCULAR; INTRAVENOUS; SOFT TISSUE at 08:26

## 2018-03-06 RX ADMIN — ONDANSETRON 4 MG: 2 INJECTION INTRAMUSCULAR; INTRAVENOUS at 08:26

## 2018-03-06 RX ADMIN — LIDOCAINE HYDROCHLORIDE 100 MG: 20 INJECTION, SOLUTION EPIDURAL; INFILTRATION; INTRACAUDAL; PERINEURAL at 07:40

## 2018-03-06 RX ADMIN — DEXAMETHASONE SODIUM PHOSPHATE 4 MG: 4 INJECTION, SOLUTION INTRA-ARTICULAR; INTRALESIONAL; INTRAMUSCULAR; INTRAVENOUS; SOFT TISSUE at 06:56

## 2018-03-06 NOTE — PROGRESS NOTES
Spiritual Care visit. Initial Visit, Pre Surgery Consult. Visit and prayer before patient goes to surgery.     Visit by Nova Jurado M.Ed., Th.B. ,Staff

## 2018-03-06 NOTE — DISCHARGE INSTRUCTIONS
Maintain dressing until your follow up appointment. Use abduction pillow (especially while sleeping). May remove starting Thursday for gentle ROM, pendulum exercises. Ice to shoulder 24 hours. Take your pain medication as needed. Call [phone] with questions or problems. ACTIVITY  · As tolerated and as directed by your doctor. · Bathe or shower as directed by your doctor. DIET  · Clear liquids until no nausea or vomiting; then light diet for the first day. · Advance to regular diet on second day, unless your doctor orders otherwise. · If nausea and vomiting continues, call your doctor. PAIN  · Take pain medication as directed by your doctor. · Call your doctor if pain is NOT relieved by medication. · DO NOT take aspirin of blood thinners unless directed by your doctor. DRESSING CARE       CALL YOUR DOCTOR IF   · Excessive bleeding that does not stop after holding pressure over the area  · Temperature of 101 degrees F or above  · Excessive redness, swelling or bruising, and/ or green or yellow, smelly discharge from incision    AFTER ANESTHESIA   · For the first 24 hours: DO NOT Drive, Drink alcoholic beverages, or Make important decisions. · Be aware of dizziness following anesthesia and while taking pain medication. APPOINTMENT DATE/ TIME    YOUR DOCTOR'S PHONE NUMBER       DISCHARGE SUMMARY from Nurse    PATIENT INSTRUCTIONS:    After general anesthesia or intravenous sedation, for 24 hours or while taking prescription Narcotics:  · Limit your activities  · Do not drive and operate hazardous machinery  · Do not make important personal or business decisions  · Do  not drink alcoholic beverages  · If you have not urinated within 8 hours after discharge, please contact your surgeon on call. *  Please give a list of your current medications to your Primary Care Provider.     *  Please update this list whenever your medications are discontinued, doses are      changed, or new medications (including over-the-counter products) are added. *  Please carry medication information at all times in case of emergency situations. These are general instructions for a healthy lifestyle:    No smoking/ No tobacco products/ Avoid exposure to second hand smoke    Surgeon General's Warning:  Quitting smoking now greatly reduces serious risk to your health. Obesity, smoking, and sedentary lifestyle greatly increases your risk for illness    A healthy diet, regular physical exercise & weight monitoring are important for maintaining a healthy lifestyle    You may be retaining fluid if you have a history of heart failure or if you experience any of the following symptoms:  Weight gain of 3 pounds or more overnight or 5 pounds in a week, increased swelling in our hands or feet or shortness of breath while lying flat in bed. Please call your doctor as soon as you notice any of these symptoms; do not wait until your next office visit. Recognize signs and symptoms of STROKE:    F-face looks uneven    A-arms unable to move or move unevenly    S-speech slurred or non-existent    T-time-call 911 as soon as signs and symptoms begin-DO NOT go       Back to bed or wait to see if you get better-TIME IS BRAIN.

## 2018-03-06 NOTE — ANESTHESIA PREPROCEDURE EVALUATION
Anesthetic History   No history of anesthetic complications            Review of Systems / Medical History  Patient summary reviewed and nursing notes reviewed    Pulmonary          Smoker (quit 18 years)      Comments: H/o RLL , for aspergillis, 2009   Neuro/Psych   Within defined limits           Cardiovascular    Hypertension: well controlled              Exercise tolerance: >4 METS  Comments: Off coumadin for 4 days on lovenox   GI/Hepatic/Renal  Within defined limits             Comments: S/p Nissen Endo/Other        Obesity and arthritis     Other Findings   Comments: Patient has h/o DVT/PE. Physical Exam    Airway  Mallampati: III  TM Distance: > 6 cm  Neck ROM: normal range of motion   Mouth opening: Normal     Cardiovascular    Rhythm: regular  Rate: normal         Dental  No notable dental hx    Comments: Chipped front teeth   Pulmonary  Breath sounds clear to auscultation               Abdominal  GI exam deferred       Other Findings            Anesthetic Plan    ASA: 2  Anesthesia type: general      Post-op pain plan if not by surgeon: peripheral nerve block single    Induction: Intravenous  Anesthetic plan and risks discussed with: Patient and Spouse      Positioning and POVL discussed. Glidescope in room.

## 2018-03-06 NOTE — ANESTHESIA PROCEDURE NOTES
Peripheral Block    Start time: 3/6/2018 6:52 AM  End time: 3/6/2018 6:56 AM  Performed by: Korina Hernandez  Authorized by: Korina Hernandez       Pre-procedure: Indications: at surgeon's request and post-op pain management    Preanesthetic Checklist: patient identified, risks and benefits discussed, site marked, timeout performed, anesthesia consent given and patient being monitored    Timeout Time: 06:52          Block Type:   Block Type: Interscalene  Laterality:  Left  Monitoring:  Standard ASA monitoring, responsive to questions, oxygen, continuous pulse ox, frequent vital sign checks and heart rate  Injection Technique:  Single shot  Procedures: ultrasound guided and nerve stimulator    Patient Position: seated (lateral decubitus)  Prep: chlorhexidine    Location:  Interscalene  Needle Type:  Stimuplex  Needle Gauge:  22 G  Needle Localization:  Nerve stimulator and ultrasound guidance  Motor Response: minimal motor response >0.4 mA    Medication Injected:  0.5%  ropivacaine  Adds:  Epi 1:200K  Volume (mL):  30    Assessment:  Number of attempts:  1  Injection Assessment:  Incremental injection every 5 mL, local visualized surrounding nerve on ultrasound, negative aspiration for blood, no intravascular symptoms, no paresthesia and ultrasound image on chart  Patient tolerance:  Patient tolerated the procedure well with no immediate complications  All needles out intact, proc. Tolerated well.

## 2018-03-06 NOTE — ANESTHESIA POSTPROCEDURE EVALUATION
Post-Anesthesia Evaluation and Assessment    Patient: Reshma Blakely MRN: [id number]  SSN: [id number]    YOB: 1955  Age: 63 y.o. Sex: male       Cardiovascular Function/Vital Signs  Visit Vitals    /82    Pulse 76    Temp 36.4 °C (97.6 °F)    Resp 14    Ht 6' 1\" (1.854 m)    Wt 112.9 kg (249 lb)    SpO2 91%    BMI 32.85 kg/m2       Patient is status post general, regional anesthesia for Procedure(s):  SHOULDER ARTHROSCOPY ROTATOR CUFF REPAIR/ LEFT. Nausea/Vomiting: None    Postoperative hydration reviewed and adequate. Pain:  Pain Scale 1: Numeric (0 - 10) (03/06/18 0927)  Pain Intensity 1: 0 (03/06/18 0927)   Managed    Neurological Status:   Neuro (WDL): Exceptions to WDL (03/06/18 8676)  Neuro  Neurologic State: Alert (03/06/18 0927)  LUE Motor Response: Numbness; Pharmacologically paralyzed (03/06/18 0927)  LLE Motor Response: Purposeful (03/06/18 0927)  RUE Motor Response: Purposeful (03/06/18 0927)  RLE Motor Response: Purposeful (03/06/18 0927)   At baseline    Mental Status and Level of Consciousness: Arousable    Pulmonary Status:   O2 Device: Room air (03/06/18 0942)   Adequate oxygenation and airway patent    Complications related to anesthesia: None    Post-anesthesia assessment completed. No concerns. Good result with interscalene block.     Signed By: Jose Gaston MD     March 6, 2018

## 2018-03-06 NOTE — IP AVS SNAPSHOT
[facility] 
 
 
 [address] [facility] [address] [facility] 
[phone] Patient: Elio Machuca 
MRN: [id number] :1955 About your hospitalization You were admitted on:  2018 You last received care in the:  [facility] OP PACU You were discharged on:  2018 Why you were hospitalized Your primary diagnosis was:  Not on File Follow-up Information Follow up With Details Comments Contact Info Sarika Granger MD   [address] 
[phone] Darya Ferraro MD   [facility] [address] 
[phone] Discharge Orders None A check braeden indicates which time of day the medication should be taken. My Medications CONTINUE taking these medications Instructions Each Dose to Equal  
 Morning Noon Evening Bedtime BYSTOLIC 5 mg tablet Generic drug:  nebivolol Your last dose was: Your next dose is: Take 5 mg by mouth every morning. Indications: HYPERTENSION  
 5 mg DEPO-TESTOSTERONE 200 mg/mL injection Generic drug:  testosterone cypionate Your last dose was: Your next dose is:    
   
   
 by IntraMUSCular route every fourteen (14) days. LOVENOX 120 mg/0.8 mL injection Generic drug:  enoxaparin Your last dose was: Your next dose is:    
   
   
 120 mg by SubCUTAneous route daily. 120 mg  
    
   
   
   
  
 traZODone 50 mg tablet Commonly known as:  Gumaro James Your last dose was: Your next dose is: Take 25-50 mg by mouth nightly as needed for Sleep. 25-50 mg  
    
   
   
   
  
 warfarin 10 mg tablet Commonly known as:  COUMADIN Your last dose was: Your next dose is: Take 1 Tab by mouth daily.  Rx Dr Radha Dumont PCP--- hx of multiple blood clots and PE following hip replacement (2007) -- Hold for 5 days prior to surgery with permission from Dr Elliot Martinez-- last dose Tues the 2/27/2017 Dr Elliot Martinez 1 Tab Discharge Instructions Maintain dressing until your follow up appointment. Use abduction pillow (especially while sleeping). May remove starting Thursday for gentle ROM, pendulum exercises. Ice to shoulder 24 hours. Take your pain medication as needed. Call [phone] with questions or problems. ACTIVITY · As tolerated and as directed by your doctor. · Bathe or shower as directed by your doctor. DIET · Clear liquids until no nausea or vomiting; then light diet for the first day. · Advance to regular diet on second day, unless your doctor orders otherwise. · If nausea and vomiting continues, call your doctor. PAIN 
· Take pain medication as directed by your doctor. · Call your doctor if pain is NOT relieved by medication. · DO NOT take aspirin of blood thinners unless directed by your doctor. DRESSING CARE  
 
 
CALL YOUR DOCTOR IF  
· Excessive bleeding that does not stop after holding pressure over the area · Temperature of 101 degrees F or above · Excessive redness, swelling or bruising, and/ or green or yellow, smelly discharge from incision AFTER ANESTHESIA · For the first 24 hours: DO NOT Drive, Drink alcoholic beverages, or Make important decisions. · Be aware of dizziness following anesthesia and while taking pain medication. APPOINTMENT DATE/ TIME 
 
YOUR DOCTOR'S PHONE NUMBER  
 
 
DISCHARGE SUMMARY from Nurse PATIENT INSTRUCTIONS: 
 
After general anesthesia or intravenous sedation, for 24 hours or while taking prescription Narcotics: · Limit your activities · Do not drive and operate hazardous machinery · Do not make important personal or business decisions · Do  not drink alcoholic beverages · If you have not urinated within 8 hours after discharge, please contact your surgeon on call. *  Please give a list of your current medications to your Primary Care Provider. *  Please update this list whenever your medications are discontinued, doses are 
    changed, or new medications (including over-the-counter products) are added. *  Please carry medication information at all times in case of emergency situations. These are general instructions for a healthy lifestyle: No smoking/ No tobacco products/ Avoid exposure to second hand smoke Surgeon General's Warning:  Quitting smoking now greatly reduces serious risk to your health. Obesity, smoking, and sedentary lifestyle greatly increases your risk for illness A healthy diet, regular physical exercise & weight monitoring are important for maintaining a healthy lifestyle You may be retaining fluid if you have a history of heart failure or if you experience any of the following symptoms:  Weight gain of 3 pounds or more overnight or 5 pounds in a week, increased swelling in our hands or feet or shortness of breath while lying flat in bed. Please call your doctor as soon as you notice any of these symptoms; do not wait until your next office visit. Recognize signs and symptoms of STROKE: 
 
F-face looks uneven A-arms unable to move or move unevenly S-speech slurred or non-existent T-time-call 911 as soon as signs and symptoms begin-DO NOT go Back to bed or wait to see if you get better-TIME IS BRAIN. Introducing [facility]! [employer] introduces [email or web address] patient portal. Now you can access parts of your medical record, email your doctor's office, and request medication refills online. 1. In your internet browser, go to [email or web address] 2. Click on the First Time User? Click Here link in the Sign In box. You will see the New Member Sign Up page. 3. Enter your [email or web address] Access Code exactly as it appears below.  You will not need to use this code after youve completed the sign-up process. If you do not sign up before the expiration date, you must request a new code. · [employer] Access Code: [email or web address] Expires: 6/4/2018  9:10 AM 
 
4. Enter the last four digits of your Social Security Number (xxxx) and Date of Birth (mm/dd/yyyy) as indicated and click Submit. You will be taken to the next sign-up page. 5. Create a [employer] ID. This will be your [employer] login ID and cannot be changed, so think of one that is secure and easy to remember. 6. Create a [employer] password. You can change your password at any time. 7. Enter your Password Reset Question and Answer. This can be used at a later time if you forget your password. 8. Enter your e-mail address. You will receive e-mail notification when new information is available in [address] 9. Click Sign Up. You can now view and download portions of your medical record. 10. Click the Download Summary menu link to download a portable copy of your medical information. If you have questions, please visit the Frequently Asked Questions section of the [employer] website. Remember, [employer] is NOT to be used for urgent needs. For medical emergencies, dial 911. Now available from your iPhone and Android! Providers Seen During Your Hospitalization Provider Specialty Primary office phone Sole Acosta MD Orthopedic Surgery [phone] Your Primary Care Physician (PCP) Primary Care Physician Office Phone Office Fax Bianka Gustafson [phone] You are allergic to the following Allergen Reactions Adhesive Contact Dermatitis Recent Documentation Height Weight BMI Smoking Status 1.854 m 112.9 kg 32.85 kg/m2 Former Smoker Emergency Contacts Name Discharge Info Relation Home Work Mobile Rob Woodard  Child [2] [phone] Leopoldo Ike  Girlfriend [18] [phone] Patient Belongings The following personal items are in your possession at time of discharge: 
  Dental Appliances: None  Visual Aid: Glasses, At home      Home Medications: None   Jewelry: None  Clothing: Shirt, Pants, Footwear, Undergarments, Socks    Other Valuables: None Please provide this summary of care documentation to your next provider. Signatures-by signing, you are acknowledging that this After Visit Summary has been reviewed with you and you have received a copy. Patient Signature:  ____________________________________________________________ Date:  ____________________________________________________________  
  
Marcio [facility] Provider Signature:  ____________________________________________________________ Date:  ____________________________________________________________

## 2018-03-06 NOTE — H&P
History and Physical Updated with no interval change. Mi Reynoso MD History and Physical Updated with no interval change.  Mi Reynoso MD

## 2018-03-06 NOTE — BRIEF OP NOTE
BRIEF OPERATIVE NOTE    Date of Procedure: 3/6/2018   Preoperative Diagnosis: Complete rupture of left rotator cuff [M75.122]  Postoperative Diagnosis: Complete rupture of left rotator cuff     Procedure(s):  SHOULDER ARTHROSCOPY ROTATOR CUFF REPAIR/ LEFT  Surgeon(s) and Role:     * Dorna Spurling, MD - Primary         Assistant Staff: None      Surgical Staff:  Circ-1: Atul Man RN  Scrub Tech-1: travayl  Event Time In   Incision Start 0401   Incision Close 8541     Anesthesia: Regional   Estimated Blood Loss: minimal  Specimens: * No specimens in log *   Findings: massive tear   Complications: none noted  Implants:   Implant Name Type Inv.  Item Serial No.  Lot No. LRB No. Used Action   ANCHOR SUT W/TAPERED NDL 2.9MM --  - [id number]  ANCHOR SUT W/TAPERED NDL 2.9MM --   [facility] [id number] Left 1 Implanted   ANCHOR SUT W/TAPERED NDL 2.9MM --  - [id number]   ANCHOR SUT W/TAPERED NDL 2.9MM --    [facility] [id number] Left 1 Implanted

## 2018-03-07 NOTE — PERIOP NOTES
Spoke with patient this morning and he states he is in a lot of pain. Patient is waiting on phone call back from Dr. Carmen Villanueva office. Patient instructed to add tyelnol/motrin in with his pain medication while waiting for return phone call. Pt verbalized understanding.

## 2018-03-14 NOTE — OP NOTES
[facility] REPORT    Eliseo Hernandez  MR#: [id number]  : 1955  ACCOUNT #: [de-identified]   DATE OF SERVICE: 2018    INDICATION:  The patient is a gentleman well known to me with a history of left shoulder pain and rotator cuff pathology refractory to conservative treatment. He has MRI showing a massive tear with some atrophy of the supraspinatus, infraspinous. We discussed different treatment options and he has opted for surgical treatment in the form of arthroscopy with possible biceps tendon release, labral tear, and massive tear of the rotator cuff. He understands risks and complications of surgery, wished to proceed. Informed consent was obtained. PREOPERATIVE DIAGNOSIS:  Left shoulder impingement, left shoulder biceps tendinitis as well as left labral tear and massive rotator cuff tear. POSTOPERATIVE DIAGNOSIS:  Left shoulder impingement, left shoulder biceps tendinitis as well as left labral tear and massive rotator cuff tear. PROCEDURES PERFORMED:  Diagnostic therapeutic arthroscopy with subacromial decompression, labral debridement, biceps tenodesis, and partial rotator cuff repair with mini open. SURGEON:  Alonzo Michelle MD     ASSISTANT:  None. ANESTHESIA:  General.    COMPLICATIONS:  No complications noted. ESTIMATED BLOOD LOSS:  25-50 mL. SPECIMENS REMOVED:  none     IMPLANTS:  See list juggernaut anchors     PROCEDURE IN DETAIL:  The patient was seen in the preoperative area. His shoulder was marked and his chart was updated. He received appropriate antibiotic therapy, was taken to operating room 7. He had a previous block placed by Anesthesia, and was placed in the beach chair position with all prominences well padded, general anesthetic achieved.   The left shoulder was prepped and draped into a sterile field and a timeout was affected by the surgeon, was confirmed by the operative team.  A posterior portal was made and the shoulder inspected and found to have significant biceps deterioration as well as labral tearing in this area. The labrum was debrided through anterior portal created by the Wissinger switching erica. The biceps tendon was released utilizing the [facility] and the resector blade, and then attention was turned to the massive rotator cuff tear. We did a thorough subacromial decompression. We freed up the ends of the rotator cuff. There was such tearing and retraction that we could only repair the anterior portion of the supraspinatus about 60%. Utilizing anchors from the [facility], we were able to use drill holes and tie the tendon  down to this. We performed thorough subacromial decompression. We did extend anterior portal which we created in earlier to affect his repair. We extended this about 2,5 cm, but the deltoid muscle was not released  We thoroughly irrigated our wounds, we closed with staples, and on the skin incisions and deeply with 2-0 Vicryl in our mini open repair area. Sterile bulky dressing was applied. EBL was minimal.  No complications.       MD MIKE Mckee / BENJA  D: 03/13/2018 17:21     T: 03/13/2018 17:57  JOB #: [id number]

## 2019-06-11 NOTE — DISCHARGE SUMMARY
[address] [facility]       Name:  Irlanda Delgado   MR#:  [id number]   :  1955   Account #:  [de-identified]   Date of Adm:  2017       PRIMARY DIAGNOSES:    1. L3-L4, L4-L5 spinal stenosis with spondylolisthesis. 2. Acute bilateral foot drop. OPERATIONS PERFORMED: Bilateral L3-4, L4-5 laminectomy,   interbody fusion, posterolateral fusion with instrumentation   done . BRIEF HISTORY: The patient is a 43-year-old gentleman who had   preexisting asymptomatic lumbar stenosis and spondylolisthesis   and [place] the beach and fell and hyperextended and developed   acute numbness and bilateral foot drop. He did not improve;   therefore, he was brought for rather urgent treatment to   decompress the spine to try to improve his ability to regain   motor function. HOSPITAL COURSE: The patient was admitted 2017, taken to   the operating room where he underwent his L3-L5 laminectomy,   fusion, instrumentation and he tolerated this well. Postoperatively, he had progressive improvement in his ankle   function. He was able to dorsiflex and plantar flex, although   they were still weak bilaterally. He was able to void, eat and   drink. His drain and Garcia catheter removed without problem. He   complained of typical postoperative back pain and overall he did   as expected. Hemoglobin stabilized at 11.8. He was just slow   progress because of the weakness, so he was transferred on   2017 to the 9th floor for ongoing continued rehab.         MD JUAN C Recio III / [place]   D:  2017   10:06   T:  2017   10:32   Job #:  [id number] Rec'd additional questions via fax from insurance. I have completed and faxed back.

## 2020-02-26 ENCOUNTER — HOSPITAL ENCOUNTER (OUTPATIENT)
Dept: SURGERY | Age: 65
Discharge: HOME | End: 2020-02-26
Payer: COMMERCIAL

## 2020-02-26 VITALS
DIASTOLIC BLOOD PRESSURE: 95 MMHG | SYSTOLIC BLOOD PRESSURE: 153 MMHG | WEIGHT: 249.25 LBS | TEMPERATURE: 98.3 F | BODY MASS INDEX: 33.76 KG/M2 | RESPIRATION RATE: 16 BRPM | OXYGEN SATURATION: 94 % | HEART RATE: 68 BPM | HEIGHT: 72 IN

## 2020-02-26 LAB
ANION GAP SERPL CALC-SCNC: 0 MMOL/L (ref 7–16)
BACTERIA SPEC CULT: NORMAL
BASOPHILS # BLD: 0 K/UL (ref 0–0.2)
BASOPHILS NFR BLD: 1 % (ref 0–2)
BUN SERPL-MCNC: 19 MG/DL (ref 8–23)
CALCIUM SERPL-MCNC: 8.6 MG/DL (ref 8.3–10.4)
CHLORIDE SERPL-SCNC: 112 MMOL/L (ref 98–107)
CO2 SERPL-SCNC: 32 MMOL/L (ref 21–32)
CREAT SERPL-MCNC: 0.89 MG/DL (ref 0.8–1.5)
DIFFERENTIAL METHOD BLD: ABNORMAL
EOSINOPHIL # BLD: 0.2 K/UL (ref 0–0.8)
EOSINOPHIL NFR BLD: 3 % (ref 0.5–7.8)
ERYTHROCYTE [DISTWIDTH] IN BLOOD BY AUTOMATED COUNT: 13.3 % (ref 11.9–14.6)
GLUCOSE SERPL-MCNC: 93 MG/DL (ref 65–100)
HCT VFR BLD AUTO: 45.8 % (ref 41.1–50.3)
HGB BLD-MCNC: 15.7 G/DL (ref 13.6–17.2)
IMM GRANULOCYTES # BLD AUTO: 0 K/UL (ref 0–0.5)
IMM GRANULOCYTES NFR BLD AUTO: 0 % (ref 0–5)
LYMPHOCYTES # BLD: 1.9 K/UL (ref 0.5–4.6)
LYMPHOCYTES NFR BLD: 32 % (ref 13–44)
MCH RBC QN AUTO: 33.6 PG (ref 26.1–32.9)
MCHC RBC AUTO-ENTMCNC: 34.3 G/DL (ref 31.4–35)
MCV RBC AUTO: 98.1 FL (ref 79.6–97.8)
MONOCYTES # BLD: 0.6 K/UL (ref 0.1–1.3)
MONOCYTES NFR BLD: 9 % (ref 4–12)
NEUTS SEG # BLD: 3.4 K/UL (ref 1.7–8.2)
NEUTS SEG NFR BLD: 56 % (ref 43–78)
NRBC # BLD: 0 K/UL (ref 0–0.2)
PLATELET # BLD AUTO: 103 K/UL (ref 150–450)
PMV BLD AUTO: 9.2 FL (ref 9.4–12.3)
POTASSIUM SERPL-SCNC: 4.3 MMOL/L (ref 3.5–5.1)
RBC # BLD AUTO: 4.67 M/UL (ref 4.23–5.6)
SERVICE CMNT-IMP: NORMAL
SODIUM SERPL-SCNC: 144 MMOL/L (ref 136–145)
WBC # BLD AUTO: 6.1 K/UL (ref 4.3–11.1)

## 2020-02-26 PROCEDURE — 80048 BASIC METABOLIC PNL TOTAL CA: CPT

## 2020-02-26 PROCEDURE — 87641 MR-STAPH DNA AMP PROBE: CPT

## 2020-02-26 PROCEDURE — 93005 ELECTROCARDIOGRAM TRACING: CPT | Performed by: ANESTHESIOLOGY

## 2020-02-26 PROCEDURE — 85025 COMPLETE CBC W/AUTO DIFF WBC: CPT

## 2020-02-26 RX ORDER — HYDROCODONE BITARTRATE AND ACETAMINOPHEN 5; 325 MG/1; MG/1
1 TABLET ORAL
COMMUNITY
End: 2020-03-05

## 2020-02-26 NOTE — PERIOP NOTES
Patient verified name and     Order for consent :Order for consent NOT found in EHR at time of PAT visit. Unable to verify case posting against order; surgery verified by patient. found in EHR and matches case posting; patient verified. Type lll surgery,  assessment complete. Labs per surgeon: None, no orders in connect care;   Labs per anesthesia protocol: MRSA, CBC, BMP, EKG;   EKG: Today, [facility] approved surgical skin cleanser and instructions given per hospital policy. Patient provided with and instructed on educational handouts including Guide to Surgery, Pain Management, Hand Hygiene, Blood Transfusion Education, and [facility]est[facility] Brochure. Patient answered medical/surgical history questions at their best of ability. All prior to admission medications documented in [facility] Care. Original medication prescription bottle were NOT visualized during patient appointment. Patient instructed to hold all vitamins 7 days prior to surgery and NSAIDS 5 days prior to surgery, patient verbalized understanding. Patient teach back successful and patient demonstrates knowledge of instructions.

## 2020-02-26 NOTE — PERIOP NOTES
Called Dr. hCavez [facility] office requesting clearance to hold Xarelto, will fax over. Clerance was for 5-7 days. Patient states his last dose was on 2/23/2020.

## 2020-02-26 NOTE — PERIOP NOTES
PLEASE CONTINUE TAKING ALL PRESCRIPTION MEDICATIONS UP TO THE DAY OF SURGERY UNLESS OTHERWISE DIRECTED BELOW. DISCONTINUE all vitamins and supplements 5 days prior to surgery. DISCONTINUE Non-Steriodal Anti-Inflammatory (NSAIDS) such as Advil and Aleve 5 days prior to surgery. Home Medications to take  the day of surgery    Bystolic, Trintellix           Home Medications   to Hold   Xarelto (last dose taken was 2/23/2020)        Comments   May have Tylenol if needed             Please do not bring home medications with you on the day of surgery unless otherwise directed by your nurse. If you are instructed to bring home medications, please give them to your nurse as they will be administered by the nursing staff. If you have any questions, [facility] [phone] or [facility] [phone].

## 2020-02-27 LAB
ATRIAL RATE: 63 BPM
CALCULATED P AXIS, ECG09: 28 DEGREES
CALCULATED R AXIS, ECG10: -12 DEGREES
CALCULATED T AXIS, ECG11: 36 DEGREES
DIAGNOSIS, 93000: NORMAL
P-R INTERVAL, ECG05: 162 MS
Q-T INTERVAL, ECG07: 396 MS
QRS DURATION, ECG06: 110 MS
QTC CALCULATION (BEZET), ECG08: 405 MS
VENTRICULAR RATE, ECG03: 63 BPM

## 2020-02-27 NOTE — PERIOP NOTES
Received faxed note from Dr. Wright Forward dated 2/12/20 that states patient cleared for surgery and may hold any and all blood thinners for 5-7 days before upcoming surgery. Note tubed to main preop.

## 2020-03-01 ENCOUNTER — ANESTHESIA EVENT (OUTPATIENT)
Dept: SURGERY | Age: 65
DRG: 470 | End: 2020-03-01
Payer: COMMERCIAL

## 2020-03-01 PROBLEM — M16.12 OSTEOARTHRITIS OF LEFT HIP: Status: ACTIVE | Noted: 2020-03-01

## 2020-03-02 ENCOUNTER — HOSPITAL ENCOUNTER (INPATIENT)
Age: 65
LOS: 3 days | Discharge: HOME | DRG: 470 | End: 2020-03-05
Attending: ORTHOPAEDIC SURGERY | Admitting: ORTHOPAEDIC SURGERY
Payer: COMMERCIAL

## 2020-03-02 ENCOUNTER — ANESTHESIA (OUTPATIENT)
Dept: SURGERY | Age: 65
DRG: 470 | End: 2020-03-02
Payer: COMMERCIAL

## 2020-03-02 ENCOUNTER — APPOINTMENT (OUTPATIENT)
Dept: GENERAL RADIOLOGY | Age: 65
DRG: 470 | End: 2020-03-02
Attending: ORTHOPAEDIC SURGERY
Payer: COMMERCIAL

## 2020-03-02 LAB
ABO + RH BLD: NORMAL
BLOOD GROUP ANTIBODIES SERPL: NORMAL
SPECIMEN EXP DATE BLD: NORMAL

## 2020-03-02 PROCEDURE — 77030006835 HC BLD SAW SAG STRY -B: Performed by: ORTHOPAEDIC SURGERY

## 2020-03-02 PROCEDURE — 74011250637 HC RX REV CODE- 250/637: Performed by: ANESTHESIOLOGY

## 2020-03-02 PROCEDURE — 76010000153 HC OR TIME 1.5 TO 2 HR: Performed by: ORTHOPAEDIC SURGERY

## 2020-03-02 PROCEDURE — 74011250636 HC RX REV CODE- 250/636: Performed by: NURSE ANESTHETIST, CERTIFIED REGISTERED

## 2020-03-02 PROCEDURE — 74011000250 HC RX REV CODE- 250: Performed by: NURSE ANESTHETIST, CERTIFIED REGISTERED

## 2020-03-02 PROCEDURE — 0SRB0JZ REPLACEMENT OF LEFT HIP JOINT WITH SYNTHETIC SUBSTITUTE, OPEN APPROACH: ICD-10-PCS | Performed by: ORTHOPAEDIC SURGERY

## 2020-03-02 PROCEDURE — 77030018836 HC SOL IRR NACL ICUM -A: Performed by: ORTHOPAEDIC SURGERY

## 2020-03-02 PROCEDURE — 77030007880 HC KT SPN EPDRL BBMI -B: Performed by: NURSE ANESTHETIST, CERTIFIED REGISTERED

## 2020-03-02 PROCEDURE — C1776 JOINT DEVICE (IMPLANTABLE): HCPCS | Performed by: ORTHOPAEDIC SURGERY

## 2020-03-02 PROCEDURE — 74011250637 HC RX REV CODE- 250/637: Performed by: ORTHOPAEDIC SURGERY

## 2020-03-02 PROCEDURE — 86900 BLOOD TYPING SEROLOGIC ABO: CPT

## 2020-03-02 PROCEDURE — 74011250636 HC RX REV CODE- 250/636: Performed by: ORTHOPAEDIC SURGERY

## 2020-03-02 PROCEDURE — 77030012894: Performed by: ORTHOPAEDIC SURGERY

## 2020-03-02 PROCEDURE — 77030037088 HC TUBE ENDOTRACH ORAL NSL COVD-A: Performed by: NURSE ANESTHETIST, CERTIFIED REGISTERED

## 2020-03-02 PROCEDURE — 77010033678 HC OXYGEN DAILY

## 2020-03-02 PROCEDURE — 76060000034 HC ANESTHESIA 1.5 TO 2 HR: Performed by: ORTHOPAEDIC SURGERY

## 2020-03-02 PROCEDURE — 77030003665 HC NDL SPN BBMI -A: Performed by: NURSE ANESTHETIST, CERTIFIED REGISTERED

## 2020-03-02 PROCEDURE — 77030008467 HC STPLR SKN COVD -B: Performed by: ORTHOPAEDIC SURGERY

## 2020-03-02 PROCEDURE — 72170 X-RAY EXAM OF PELVIS: CPT

## 2020-03-02 PROCEDURE — 65270000029 HC RM PRIVATE

## 2020-03-02 PROCEDURE — 97116 GAIT TRAINING THERAPY: CPT

## 2020-03-02 PROCEDURE — 97161 PT EVAL LOW COMPLEX 20 MIN: CPT

## 2020-03-02 PROCEDURE — 74011250636 HC RX REV CODE- 250/636: Performed by: ANESTHESIOLOGY

## 2020-03-02 PROCEDURE — 77030013727 HC IRR FAN PULSVC ZIMM -B: Performed by: ORTHOPAEDIC SURGERY

## 2020-03-02 PROCEDURE — 77030029883 HC RETRV SUT ARTHSCP HOFFE BEAT -B: Performed by: ORTHOPAEDIC SURGERY

## 2020-03-02 PROCEDURE — 77030031139 HC SUT VCRL2 J&J -A: Performed by: ORTHOPAEDIC SURGERY

## 2020-03-02 PROCEDURE — 94760 N-INVAS EAR/PLS OXIMETRY 1: CPT

## 2020-03-02 PROCEDURE — 77030039425 HC BLD LARYNG TRULITE DISP TELE -A: Performed by: NURSE ANESTHETIST, CERTIFIED REGISTERED

## 2020-03-02 PROCEDURE — 77030040922 HC BLNKT HYPOTHRM STRY -A: Performed by: NURSE ANESTHETIST, CERTIFIED REGISTERED

## 2020-03-02 PROCEDURE — 77030002922 HC SUT FBRWRE ARTH -B: Performed by: ORTHOPAEDIC SURGERY

## 2020-03-02 PROCEDURE — 77030041279 HC DRSG PRMSL AG MDII -B: Performed by: ORTHOPAEDIC SURGERY

## 2020-03-02 PROCEDURE — 76210000006 HC OR PH I REC 0.5 TO 1 HR: Performed by: ORTHOPAEDIC SURGERY

## 2020-03-02 DEVICE — POLARCUP SHELL TI-PLASMA 57 NON-CEMENTED
Type: IMPLANTABLE DEVICE | Site: HIP | Status: FUNCTIONAL
Brand: POLARCUP

## 2020-03-02 DEVICE — IMPLANTABLE DEVICE: Type: IMPLANTABLE DEVICE | Status: FUNCTIONAL

## 2020-03-02 DEVICE — COBALT CHROME 12/14 TAPER FEMORAL                                    HEAD 28MM - 3: Type: IMPLANTABLE DEVICE | Site: HIP | Status: FUNCTIONAL

## 2020-03-02 DEVICE — POLARSTEM STANDARD NON-CEMENTED                                    WITH TI/HA 6
Type: IMPLANTABLE DEVICE | Site: HIP | Status: FUNCTIONAL
Brand: POLARSTEM

## 2020-03-02 RX ORDER — CEFAZOLIN SODIUM/WATER 2 G/20 ML
2 SYRINGE (ML) INTRAVENOUS ONCE
Status: COMPLETED | OUTPATIENT
Start: 2020-03-02 | End: 2020-03-02

## 2020-03-02 RX ORDER — AMOXICILLIN 250 MG
2 CAPSULE ORAL DAILY
Status: DISCONTINUED | OUTPATIENT
Start: 2020-03-03 | End: 2020-03-05 | Stop reason: HOSPADM

## 2020-03-02 RX ORDER — OXYCODONE AND ACETAMINOPHEN 10; 325 MG/1; MG/1
1 TABLET ORAL
Status: DISCONTINUED | OUTPATIENT
Start: 2020-03-02 | End: 2020-03-05 | Stop reason: HOSPADM

## 2020-03-02 RX ORDER — DEXAMETHASONE SODIUM PHOSPHATE 100 MG/10ML
10 INJECTION INTRAMUSCULAR; INTRAVENOUS ONCE
Status: COMPLETED | OUTPATIENT
Start: 2020-03-03 | End: 2020-03-03

## 2020-03-02 RX ORDER — MORPHINE SULFATE 10 MG/ML
INJECTION, SOLUTION INTRAMUSCULAR; INTRAVENOUS AS NEEDED
Status: DISCONTINUED | OUTPATIENT
Start: 2020-03-02 | End: 2020-03-02 | Stop reason: HOSPADM

## 2020-03-02 RX ORDER — CEFAZOLIN SODIUM/WATER 2 G/20 ML
2 SYRINGE (ML) INTRAVENOUS EVERY 8 HOURS
Status: COMPLETED | OUTPATIENT
Start: 2020-03-02 | End: 2020-03-03

## 2020-03-02 RX ORDER — ONDANSETRON 2 MG/ML
INJECTION INTRAMUSCULAR; INTRAVENOUS AS NEEDED
Status: DISCONTINUED | OUTPATIENT
Start: 2020-03-02 | End: 2020-03-02 | Stop reason: HOSPADM

## 2020-03-02 RX ORDER — MIDAZOLAM HYDROCHLORIDE 1 MG/ML
INJECTION, SOLUTION INTRAMUSCULAR; INTRAVENOUS AS NEEDED
Status: DISCONTINUED | OUTPATIENT
Start: 2020-03-02 | End: 2020-03-02 | Stop reason: HOSPADM

## 2020-03-02 RX ORDER — NEOSTIGMINE METHYLSULFATE 1 MG/ML
INJECTION, SOLUTION INTRAVENOUS AS NEEDED
Status: DISCONTINUED | OUTPATIENT
Start: 2020-03-02 | End: 2020-03-02 | Stop reason: HOSPADM

## 2020-03-02 RX ORDER — DEXAMETHASONE SODIUM PHOSPHATE 4 MG/ML
INJECTION, SOLUTION INTRA-ARTICULAR; INTRALESIONAL; INTRAMUSCULAR; INTRAVENOUS; SOFT TISSUE AS NEEDED
Status: DISCONTINUED | OUTPATIENT
Start: 2020-03-02 | End: 2020-03-02 | Stop reason: HOSPADM

## 2020-03-02 RX ORDER — KETOROLAC TROMETHAMINE 30 MG/ML
INJECTION, SOLUTION INTRAMUSCULAR; INTRAVENOUS AS NEEDED
Status: DISCONTINUED | OUTPATIENT
Start: 2020-03-02 | End: 2020-03-02 | Stop reason: HOSPADM

## 2020-03-02 RX ORDER — TRANEXAMIC ACID 100 MG/ML
INJECTION, SOLUTION INTRAVENOUS AS NEEDED
Status: DISCONTINUED | OUTPATIENT
Start: 2020-03-02 | End: 2020-03-02 | Stop reason: HOSPADM

## 2020-03-02 RX ORDER — SODIUM CHLORIDE 0.9 % (FLUSH) 0.9 %
5-40 SYRINGE (ML) INJECTION EVERY 8 HOURS
Status: DISCONTINUED | OUTPATIENT
Start: 2020-03-02 | End: 2020-03-05 | Stop reason: HOSPADM

## 2020-03-02 RX ORDER — MIDAZOLAM HYDROCHLORIDE 1 MG/ML
2 INJECTION, SOLUTION INTRAMUSCULAR; INTRAVENOUS ONCE
Status: DISCONTINUED | OUTPATIENT
Start: 2020-03-02 | End: 2020-03-02 | Stop reason: HOSPADM

## 2020-03-02 RX ORDER — LIDOCAINE HYDROCHLORIDE 10 MG/ML
0.1 INJECTION INFILTRATION; PERINEURAL AS NEEDED
Status: DISCONTINUED | OUTPATIENT
Start: 2020-03-02 | End: 2020-03-02 | Stop reason: HOSPADM

## 2020-03-02 RX ORDER — FAMOTIDINE 20 MG/1
20 TABLET, FILM COATED ORAL ONCE
Status: COMPLETED | OUTPATIENT
Start: 2020-03-02 | End: 2020-03-02

## 2020-03-02 RX ORDER — SUCCINYLCHOLINE CHLORIDE 20 MG/ML
INJECTION INTRAMUSCULAR; INTRAVENOUS AS NEEDED
Status: DISCONTINUED | OUTPATIENT
Start: 2020-03-02 | End: 2020-03-02 | Stop reason: HOSPADM

## 2020-03-02 RX ORDER — CELECOXIB 200 MG/1
200 CAPSULE ORAL
Status: COMPLETED | OUTPATIENT
Start: 2020-03-02 | End: 2020-03-02

## 2020-03-02 RX ORDER — FENTANYL CITRATE 50 UG/ML
100 INJECTION, SOLUTION INTRAMUSCULAR; INTRAVENOUS ONCE
Status: DISCONTINUED | OUTPATIENT
Start: 2020-03-02 | End: 2020-03-02 | Stop reason: HOSPADM

## 2020-03-02 RX ORDER — ROCURONIUM BROMIDE 10 MG/ML
INJECTION, SOLUTION INTRAVENOUS AS NEEDED
Status: DISCONTINUED | OUTPATIENT
Start: 2020-03-02 | End: 2020-03-02 | Stop reason: HOSPADM

## 2020-03-02 RX ORDER — HYDROMORPHONE HYDROCHLORIDE 2 MG/ML
0.5 INJECTION, SOLUTION INTRAMUSCULAR; INTRAVENOUS; SUBCUTANEOUS
Status: DISCONTINUED | OUTPATIENT
Start: 2020-03-02 | End: 2020-03-02 | Stop reason: HOSPADM

## 2020-03-02 RX ORDER — SODIUM CHLORIDE, SODIUM LACTATE, POTASSIUM CHLORIDE, CALCIUM CHLORIDE 600; 310; 30; 20 MG/100ML; MG/100ML; MG/100ML; MG/100ML
75 INJECTION, SOLUTION INTRAVENOUS CONTINUOUS
Status: DISCONTINUED | OUTPATIENT
Start: 2020-03-02 | End: 2020-03-02 | Stop reason: HOSPADM

## 2020-03-02 RX ORDER — MIDAZOLAM HYDROCHLORIDE 1 MG/ML
2 INJECTION, SOLUTION INTRAMUSCULAR; INTRAVENOUS ONCE
Status: COMPLETED | OUTPATIENT
Start: 2020-03-02 | End: 2020-03-02

## 2020-03-02 RX ORDER — PROPOFOL 10 MG/ML
INJECTION, EMULSION INTRAVENOUS AS NEEDED
Status: DISCONTINUED | OUTPATIENT
Start: 2020-03-02 | End: 2020-03-02 | Stop reason: HOSPADM

## 2020-03-02 RX ORDER — OXYCODONE HYDROCHLORIDE 5 MG/1
5 TABLET ORAL
Status: DISCONTINUED | OUTPATIENT
Start: 2020-03-02 | End: 2020-03-02 | Stop reason: HOSPADM

## 2020-03-02 RX ORDER — LIDOCAINE HYDROCHLORIDE 20 MG/ML
INJECTION, SOLUTION EPIDURAL; INFILTRATION; INTRACAUDAL; PERINEURAL AS NEEDED
Status: DISCONTINUED | OUTPATIENT
Start: 2020-03-02 | End: 2020-03-02 | Stop reason: HOSPADM

## 2020-03-02 RX ORDER — GLYCOPYRROLATE 0.2 MG/ML
INJECTION INTRAMUSCULAR; INTRAVENOUS AS NEEDED
Status: DISCONTINUED | OUTPATIENT
Start: 2020-03-02 | End: 2020-03-02 | Stop reason: HOSPADM

## 2020-03-02 RX ORDER — HEPARIN SODIUM 5000 [USP'U]/ML
5000 INJECTION, SOLUTION INTRAVENOUS; SUBCUTANEOUS
Status: COMPLETED | OUTPATIENT
Start: 2020-03-02 | End: 2020-03-02

## 2020-03-02 RX ORDER — FENTANYL CITRATE 50 UG/ML
INJECTION, SOLUTION INTRAMUSCULAR; INTRAVENOUS AS NEEDED
Status: DISCONTINUED | OUTPATIENT
Start: 2020-03-02 | End: 2020-03-02 | Stop reason: HOSPADM

## 2020-03-02 RX ORDER — ENOXAPARIN SODIUM 100 MG/ML
40 INJECTION SUBCUTANEOUS EVERY 24 HOURS
Status: COMPLETED | OUTPATIENT
Start: 2020-03-02 | End: 2020-03-02

## 2020-03-02 RX ORDER — ROPIVACAINE HYDROCHLORIDE 5 MG/ML
INJECTION, SOLUTION EPIDURAL; INFILTRATION; PERINEURAL AS NEEDED
Status: DISCONTINUED | OUTPATIENT
Start: 2020-03-02 | End: 2020-03-02 | Stop reason: HOSPADM

## 2020-03-02 RX ORDER — NALOXONE HYDROCHLORIDE 0.4 MG/ML
.2-.4 INJECTION, SOLUTION INTRAMUSCULAR; INTRAVENOUS; SUBCUTANEOUS
Status: DISCONTINUED | OUTPATIENT
Start: 2020-03-02 | End: 2020-03-05 | Stop reason: HOSPADM

## 2020-03-02 RX ORDER — HYDROMORPHONE HYDROCHLORIDE 1 MG/ML
1 INJECTION, SOLUTION INTRAMUSCULAR; INTRAVENOUS; SUBCUTANEOUS
Status: DISCONTINUED | OUTPATIENT
Start: 2020-03-02 | End: 2020-03-05 | Stop reason: HOSPADM

## 2020-03-02 RX ORDER — NEBIVOLOL 5 MG/1
5 TABLET ORAL DAILY
Status: DISCONTINUED | OUTPATIENT
Start: 2020-03-03 | End: 2020-03-05 | Stop reason: HOSPADM

## 2020-03-02 RX ORDER — ASPIRIN 325 MG
325 TABLET ORAL
Status: COMPLETED | OUTPATIENT
Start: 2020-03-02 | End: 2020-03-02

## 2020-03-02 RX ORDER — SODIUM CHLORIDE 0.9 % (FLUSH) 0.9 %
5-40 SYRINGE (ML) INJECTION AS NEEDED
Status: DISCONTINUED | OUTPATIENT
Start: 2020-03-02 | End: 2020-03-05 | Stop reason: HOSPADM

## 2020-03-02 RX ORDER — CELECOXIB 200 MG/1
200 CAPSULE ORAL EVERY 12 HOURS
Status: DISCONTINUED | OUTPATIENT
Start: 2020-03-02 | End: 2020-03-05 | Stop reason: HOSPADM

## 2020-03-02 RX ORDER — DIPHENHYDRAMINE HCL 25 MG
25 CAPSULE ORAL
Status: DISCONTINUED | OUTPATIENT
Start: 2020-03-02 | End: 2020-03-05 | Stop reason: HOSPADM

## 2020-03-02 RX ORDER — EPHEDRINE SULFATE/0.9% NACL/PF 50 MG/5 ML
SYRINGE (ML) INTRAVENOUS AS NEEDED
Status: DISCONTINUED | OUTPATIENT
Start: 2020-03-02 | End: 2020-03-02 | Stop reason: HOSPADM

## 2020-03-02 RX ORDER — SODIUM CHLORIDE 9 MG/ML
100 INJECTION, SOLUTION INTRAVENOUS CONTINUOUS
Status: DISPENSED | OUTPATIENT
Start: 2020-03-02 | End: 2020-03-04

## 2020-03-02 RX ORDER — OXYCODONE HYDROCHLORIDE 5 MG/1
10 TABLET ORAL
Status: COMPLETED | OUTPATIENT
Start: 2020-03-02 | End: 2020-03-02

## 2020-03-02 RX ADMIN — CELECOXIB 200 MG: 200 CAPSULE ORAL at 20:46

## 2020-03-02 RX ADMIN — Medication 1 AMPULE: at 20:44

## 2020-03-02 RX ADMIN — FENTANYL CITRATE 50 MCG: 50 INJECTION INTRAMUSCULAR; INTRAVENOUS at 11:30

## 2020-03-02 RX ADMIN — Medication 2 G: at 20:44

## 2020-03-02 RX ADMIN — GLYCOPYRROLATE 0.4 MG: 0.2 INJECTION, SOLUTION INTRAMUSCULAR; INTRAVENOUS at 12:08

## 2020-03-02 RX ADMIN — OXYCODONE HYDROCHLORIDE AND ACETAMINOPHEN 1 TABLET: 10; 325 TABLET ORAL at 14:05

## 2020-03-02 RX ADMIN — HYDROMORPHONE HYDROCHLORIDE 1 MG: 1 INJECTION, SOLUTION INTRAMUSCULAR; INTRAVENOUS; SUBCUTANEOUS at 20:46

## 2020-03-02 RX ADMIN — CELECOXIB 200 MG: 200 CAPSULE ORAL at 08:51

## 2020-03-02 RX ADMIN — DEXAMETHASONE SODIUM PHOSPHATE 4 MG: 4 INJECTION, SOLUTION INTRAMUSCULAR; INTRAVENOUS at 11:51

## 2020-03-02 RX ADMIN — Medication 10 ML: at 20:45

## 2020-03-02 RX ADMIN — Medication 5 ML: at 20:47

## 2020-03-02 RX ADMIN — MIDAZOLAM HYDROCHLORIDE 2 MG: 2 INJECTION, SOLUTION INTRAMUSCULAR; INTRAVENOUS at 10:39

## 2020-03-02 RX ADMIN — ASPIRIN 325 MG ORAL TABLET 325 MG: 325 PILL ORAL at 09:22

## 2020-03-02 RX ADMIN — TRANEXAMIC ACID 2000 MG: 100 INJECTION, SOLUTION INTRAVENOUS at 10:50

## 2020-03-02 RX ADMIN — OXYCODONE HYDROCHLORIDE 10 MG: 5 TABLET ORAL at 12:49

## 2020-03-02 RX ADMIN — SODIUM CHLORIDE, SODIUM LACTATE, POTASSIUM CHLORIDE, AND CALCIUM CHLORIDE: 600; 310; 30; 20 INJECTION, SOLUTION INTRAVENOUS at 11:24

## 2020-03-02 RX ADMIN — SODIUM CHLORIDE 100 ML/HR: 900 INJECTION, SOLUTION INTRAVENOUS at 14:18

## 2020-03-02 RX ADMIN — ROCURONIUM BROMIDE 5 MG: 10 INJECTION, SOLUTION INTRAVENOUS at 10:42

## 2020-03-02 RX ADMIN — Medication 10 MG: at 11:17

## 2020-03-02 RX ADMIN — MIDAZOLAM 2 MG: 1 INJECTION INTRAMUSCULAR; INTRAVENOUS at 09:59

## 2020-03-02 RX ADMIN — SODIUM CHLORIDE, SODIUM LACTATE, POTASSIUM CHLORIDE, AND CALCIUM CHLORIDE 75 ML/HR: 600; 310; 30; 20 INJECTION, SOLUTION INTRAVENOUS at 08:51

## 2020-03-02 RX ADMIN — HEPARIN SODIUM 5000 UNITS: 5000 INJECTION INTRAVENOUS; SUBCUTANEOUS at 09:59

## 2020-03-02 RX ADMIN — SUCCINYLCHOLINE CHLORIDE 180 MG: 20 INJECTION, SOLUTION INTRAMUSCULAR; INTRAVENOUS at 10:42

## 2020-03-02 RX ADMIN — FAMOTIDINE 20 MG: 20 TABLET ORAL at 08:51

## 2020-03-02 RX ADMIN — ENOXAPARIN SODIUM 40 MG: 40 INJECTION SUBCUTANEOUS at 20:46

## 2020-03-02 RX ADMIN — ONDANSETRON 4 MG: 2 INJECTION INTRAMUSCULAR; INTRAVENOUS at 11:51

## 2020-03-02 RX ADMIN — Medication 10 MG: at 11:20

## 2020-03-02 RX ADMIN — ROCURONIUM BROMIDE 45 MG: 10 INJECTION, SOLUTION INTRAVENOUS at 10:51

## 2020-03-02 RX ADMIN — FENTANYL CITRATE 50 MCG: 50 INJECTION INTRAMUSCULAR; INTRAVENOUS at 10:42

## 2020-03-02 RX ADMIN — PROPOFOL 170 MG: 10 INJECTION, EMULSION INTRAVENOUS at 10:42

## 2020-03-02 RX ADMIN — TRANEXAMIC ACID 1000 MG: 100 INJECTION, SOLUTION INTRAVENOUS at 11:49

## 2020-03-02 RX ADMIN — HYDROMORPHONE HYDROCHLORIDE 0.5 MG: 2 INJECTION INTRAMUSCULAR; INTRAVENOUS; SUBCUTANEOUS at 12:49

## 2020-03-02 RX ADMIN — LIDOCAINE HYDROCHLORIDE 80 MG: 20 INJECTION, SOLUTION EPIDURAL; INFILTRATION; INTRACAUDAL; PERINEURAL at 10:42

## 2020-03-02 RX ADMIN — SODIUM CHLORIDE 100 ML/HR: 900 INJECTION, SOLUTION INTRAVENOUS at 20:59

## 2020-03-02 RX ADMIN — Medication 2 G: at 10:44

## 2020-03-02 RX ADMIN — Medication 3 MG: at 12:08

## 2020-03-02 RX ADMIN — HYDROMORPHONE HYDROCHLORIDE 1 MG: 1 INJECTION, SOLUTION INTRAMUSCULAR; INTRAVENOUS; SUBCUTANEOUS at 17:16

## 2020-03-02 RX ADMIN — OXYCODONE HYDROCHLORIDE AND ACETAMINOPHEN 1 TABLET: 10; 325 TABLET ORAL at 23:10

## 2020-03-02 RX ADMIN — DIPHENHYDRAMINE HYDROCHLORIDE 25 MG: 25 CAPSULE ORAL at 20:46

## 2020-03-02 RX ADMIN — Medication 3 AMPULE: at 08:52

## 2020-03-02 NOTE — BRIEF OP NOTE
BRIEF OPERATIVE NOTE    Date of Procedure: 3/2/2020   Preoperative Diagnosis: Osteoarthritis of left hip, unspecified osteoarthritis type [M16.12]  Postoperative Diagnosis: Osteoarthritis of left hip, unspecified osteoarthritis type [M16.12]    Procedure(s):  LEFT TOTAL HIP ARTHROPLASTY  Surgeon(s) and Role:     Stephanie Simmons MD - Primary             Surgical Staff:  Circ-1: Heavenly Torres RN  Circ-Relief: Wili Elkins RN  Scrub Tech-1: Vini Solorzano  Scrub Tech-2: Marcy Zuniga  Scrub Tech-Relief: Maurice Spann  Event Time In Time Out   Incision Start 1106    Incision Close       Anesthesia: General   Estimated Blood Loss: 400 ml  Specimens: * No specimens in log *   Findings: severe DJD   Complications: none noted  Implants:   Implant Name Type Inv.  Item Serial No.  Lot No. LRB No. Used Action   SHELL TI-PLASMA COAT SZ 57 -- POLARCUP - [id number]  SHELL TI-PLASMA COAT SZ 62 -- POLARCUP  CELESTE [employer] NEPHEW [employer]TH[employer] [id number] Left 1 Implanted   [facility] AND NEPH XLPE INSERT     [id number] Left 1 Implanted   HEAD FEM 12/14 D 28 -3 COCR -- SYNERGY - [id number]  HEAD FEM 12/14 D 28 -3 COCR -- SYNERGY  SMITH [employer] NEPHEW ORTHOP[employer]IC [id number] Left 1 Implanted   STEM FEM CEMLS STD CONE SZ 6 -- POLARSTEM - [id number]  STEM FEM CEMLS STD CONE SZ 6 -- Redgie Overall AND NEPHEW ORTHOPEDIC [id number] Left 1 Implanted

## 2020-03-02 NOTE — PERIOP NOTES
TRANSFER - OUT REPORT:    Verbal report given to DEMETRIO betancur on Harrison So  being transferred to  for routine post - op       Report consisted of patients Situation, Background, Assessment and   Recommendations(SBAR). Information from the following report(s) SBAR, Kardex, OR Summary, Procedure Summary, Intake/Output and MAR was reviewed with the receiving nurse. Lines:   Peripheral IV 03/02/20 Right Hand (Active)   Site Assessment Clean, dry, & intact 3/2/2020  1:01 PM   Phlebitis Assessment 0 3/2/2020  1:01 PM   Infiltration Assessment 0 3/2/2020  1:01 PM   Dressing Status Clean, dry, & intact 3/2/2020  1:01 PM   Dressing Type Transparent;Tape 3/2/2020  1:01 PM   Hub Color/Line Status Patent; Infusing 3/2/2020  1:01 PM        Opportunity for questions and clarification was provided. Patient transported with:   O2 @ 3 liters    VTE prophylaxis orders have been written for Harrison So.    Patient and family given floor number and nurses name. Family updated re: pt status after security code verified.

## 2020-03-02 NOTE — OP NOTES
[facility]  OPERATIVE REPORT    Name:  Jay Weber  MR#:  [id number]  :  1955  ACCOUNT #:  [de-identified]  DATE OF SERVICE:  2020    PREOPERATIVE DIAGNOSIS:  Left hip osteoarthritis. POSTOPERATIVE DIAGNOSIS:  Left hip osteoarthritis. PROCEDURE PERFORMED:  Left total hip arthroplasty with dual-mobility Smith and Nephew press-fit components. SURGEON:  Maia Hand MD    ASSISTANT:  None. ANESTHESIA:  General.    COMPLICATIONS:  No complication noted. SPECIMENS REMOVED:  0    IMPLANTS:  See brief op note    ESTIMATED BLOOD LOSS:  400 mL    INDICATION:  The patient is a 72-year-old gentleman. He is status post right hip arthroplasty in the past, did quite well with this. He developed similar symptoms to his left hip and x-ray examination was consistent with degenerative arthritis of the left hip. We discussed different treatment options. He has opted for total hip arthroplasty. We discussed the dual-mobility Smith and Nephew type. He understands the risks and complications inherent and wished to proceed. He also understands the risks inherent with any arthroplasty to include but not limited to infection, dislocation, wearing out which may necessitate surgery, leg length inequality, blood clots, blood loss, and other less common but potentially serious complication that may occur. Informed consent was obtained. PROCEDURE IN DETAIL:  The patient was seen in the preoperative area. His chart was updated. His hip was marked. He did receive 5000 units of subcutaneous heparin per Anesthesia order. This was done because the patient had been off his blood thinner for Xarelto for sometime. Because of this, a general anesthetic was done. The patient was taken to room 8. Successful general anesthetic was achieved. He received 2 grams of Ancef perioperatively as well as 2 grams of TXA.   He was placed in the left lateral decubitus position on a pegboard with a gel liner with axillary roll. All prominences were well padded. His hip was prepped and draped into a sterile field. Time-out was effected by the operative team.  Once confirmed, we proceeded utilizing a posterior approach to the hip with the incision centered over the proximal lateral trochanter in the proximal portion curving gently into the buttock approximately 20 cm in length. Dissection was carried sharply through the skin and subcutaneous tissue down to the tendon of the tensor fasciae latae. The tensor fasciae latae was incised in line with the skin incision. A Charnley retractor was utilized at this point. The hip was held in external rotation. The gluteus medius tendon as well as external rotators were removed and tagged for repair. The capsule was T'd with a FiberWire suture for repair and elevated. The femoral neck and proximal femur were dislocated. The guide from the [facility] instrumentation was used to perform a femoral neck osteotomy. There was significant degenerative changes seen in the femoral head. This was not sent as a specimen. We then removed the labrum circumferentially around the acetabulum. We removed soft tissue from the depths of the acetabulum with a rongeur. We then reamed up to a size 57. We then press-fit a 57 dual-mobility  no holes cup  into the acetabulum  without difficulty. It was a very stable configuration and good fit and fill. We then turned to the proximal femur. Soft tissue was removed with sharp dissection. We used a cookie cutter broach to open up the canal.  We hand reamed with a star reamer and then broached up to a 6 trial with a -3 neck. We trialed initially  with a 0  Neck which was tight  and then a -3, 57 outer diameter and 28-mm inner diameter. This was very stable to all ranges of motion. .  Good leg length restoration was achieved.   We removed all trial components and placed in a permanent HA-coated femoral press-fit stem with a -3 neck, 57 outer diameter with 28-mm inner diameter. We relocated this, and again, it was stable throughout range of motion. We thoroughly irrigated with 3 liters of PulsaVac lavage. W closed the capsule and external rotators using the Hewson suture passer through drill holes in the proximal lateral femur in the trochanteric region. These were tied down. We then closed the tensor fasciae with running suture of 1 Vicryl doubled over, deeply with 0 Vicryl, 2-0 Vicryl, and clips on the skin. He appeared to tolerate the procedure well and will be admitted to our service.       MD KIN Cote/[id number]/  D:  03/02/2020 12:59  T:  03/02/2020 13:58  JOB #:  [id number]

## 2020-03-02 NOTE — PROGRESS NOTES
Problem: Falls - Risk of  Goal: *Absence of Falls  Description  Document Tashia Gallegos Fall Risk and appropriate interventions in the flowsheet. Outcome: Progressing Towards Goal  Note: Fall Risk Interventions:  Mobility Interventions: Bed/chair exit alarm, OT consult for ADLs, Patient to call before getting OOB, PT Consult for mobility concerns         Medication Interventions: Bed/chair exit alarm, Patient to call before getting OOB, Teach patient to arise slowly    Elimination Interventions: Bed/chair exit alarm, Call light in reach, Patient to call for help with toileting needs, Toileting schedule/hourly rounds              Problem: Patient Education: Go to Patient Education Activity  Goal: Patient/Family Education  Outcome: Progressing Towards Goal     Problem: Surgical Pathway Day of Surgery  Goal: Off Pathway (Use only if patient is Off Pathway)  Outcome: Progressing Towards Goal  Goal: Activity/Safety  Outcome: Progressing Towards Goal  Goal: Consults, if ordered  Outcome: Progressing Towards Goal  Goal: Nutrition/Diet  Outcome: Progressing Towards Goal  Goal: Medications  Outcome: Progressing Towards Goal  Goal: Respiratory  Outcome: Progressing Towards Goal  Goal: Treatments/Interventions/Procedures  Outcome: Progressing Towards Goal  Goal: Psychosocial  Outcome: Progressing Towards Goal  Goal: *No signs and symptoms of infection or wound complications  Outcome: Progressing Towards Goal  Goal: *Optimal pain control at patient's stated goal  Outcome: Progressing Towards Goal  Goal: *Adequate urinary output (equal to or greater than 30 milliliters/hour)  Description  Ambulatory Surgery patients voiding without difficulty.   Outcome: Progressing Towards Goal  Goal: *Hemodynamically stable  Outcome: Progressing Towards Goal  Goal: *Tolerating diet  Outcome: Progressing Towards Goal  Goal: *Demonstrates progressive activity  Outcome: Progressing Towards Goal

## 2020-03-02 NOTE — ANESTHESIA POSTPROCEDURE EVALUATION
Procedure(s):  LEFT TOTAL HIP ARTHROPLASTY. general    Anesthesia Post Evaluation      Multimodal analgesia: multimodal analgesia not used between 6 hours prior to anesthesia start to PACU discharge  Patient location during evaluation: PACU  Patient participation: complete - patient participated  Level of consciousness: awake and alert  Pain management: adequate  Airway patency: patent  Anesthetic complications: no  Cardiovascular status: hemodynamically stable  Respiratory status: acceptable  Hydration status: acceptable        Vitals Value Taken Time   /72 3/2/2020 12:56 PM   Temp 36.8 °C (98.2 °F) 3/2/2020 12:27 PM   Pulse 55 3/2/2020 12:59 PM   Resp 16 3/2/2020 12:40 PM   SpO2 99 % 3/2/2020 12:59 PM   Vitals shown include unvalidated device data.

## 2020-03-02 NOTE — PROGRESS NOTES
Problem: Mobility Impaired (Adult and Pediatric)  Goal: *Acute Goals and Plan of Care  Description  Acute PT Goals:  (1.) Mr. Lisa Arce will move from supine to sit and sit to supine , scoot up and down and roll side to side with MODIFIED INDEPENDENCE within 7 treatment day(s). (2.) Mr. Lisa Arce will transfer from bed to chair and chair to bed WBAT LLE with MODIFIED INDEPENDENCE using the least restrictive device within 7 treatment day(s). (3.) Mr. Lisa Arce will ambulate with MODIFIED INDEPENDENCE for 500 feet with the least restrictive device within 7 treatment day(s). (4.) Mr. Lisa Arce will perform standing static and dynamic balance activities x 25 minutes WBAT LLE  with MODIFIED INDEPENDENCE to improve safety within 7 treatment day(s). (5.) Mr. Lisa Arce will ascend and descend 4 stairs using no hand rail(s) with MODIFIED INDEPENDENCE to improve functional mobility and safety within 7 treatment day(s). (6.) Mr. Lisa Arce will perform bilateral lower extremity exercises x 15 min for HEP with INDEPENDENCE to improve strength, endurance, and functional mobility within 7 treatment day(s). PHYSICAL THERAPY: Initial Assessment, Daily Note, and PM 3/2/2020  INPATIENT: PT Visit Days : 1  Payor: Dilma Oconnor / Plan: [facility] CROSS BLUE ESSENTIALS LINDA / Product Type: [employer] /       NAME/AGE/GENDER: Fern Hadley is a 60 y.o. male   PRIMARY DIAGNOSIS: Osteoarthritis of left hip, unspecified osteoarthritis type [M16.12]  Osteoarthritis of left hip [M16.12] Osteoarthritis of left hip Osteoarthritis of left hip  Procedure(s) (LRB):  LEFT TOTAL HIP ARTHROPLASTY (Left)  Day of Surgery  ICD-10: Treatment Diagnosis:   · Difficulty in walking, Not elsewhere classified (R26.2)  · Other abnormalities of gait and mobility (R26.89)   Precaution/Allergies:  Adhesive      ASSESSMENT:                                                                                        WBAT LLE    Mr. Lisa Arce is a 60year old M who presents s/p LLE EJ.  Prior to hospital admission pt lives alone, but will be moving in with his girlfriend follow surgery, in a one story home with four step(s) to enter. Pt endorses no falls in past 6 months. Prior to admission Mr. Ba Rideloy uses no DME for mobility, he does have a rolling walker availability for mobility following surgery. Upon entering, pt resting in bed, agreeable to PT evaluation. he reports 5/10 pain in his LLE hip at rest. BLE assessment indicates sensation to light touch intact, AROM WFL (slightly diminished d/t pain LLE), and strength WFL. Pt performed supine > sit with Min A (assist with LLE), sitting EOB with good sitting balance control. Sit > stand with Min Ax2 using RW. Treatment initiated to include side steps EOB x5 ft CGA/RW. Sit <> stand transfer training with Min A, cues for sequencing. Gait x 100 ft with CGA/RW, cues for pacing, posture, walker progression, step length. Upon returning to room PT instructed pt in importance of mobility, and in ankle pumps. Pt verbalized understanding. Sit > supine with Min A. At end of session pt resting in bed with all needs within reach, family at bedside. RN notified. Pt presents as functioning below his baseline, with deficits in mobility including transfers, gait, balance, and activity tolerance. Pt will benefit from skilled therapy services to address stated deficits to promote return to highest level of function, independence, and safety. Will continue to follow. This section established at most recent assessment   PROBLEM LIST (Impairments causing functional limitations):  1. Decreased Strength  2. Decreased ADL/Functional Activities  3. Decreased Transfer Abilities  4. Decreased Ambulation Ability/Technique  5. Decreased Balance  6. Increased Pain  7. Decreased Flexibility/Joint Mobility   INTERVENTIONS PLANNED: (Benefits and precautions of physical therapy have been discussed with the patient.)  1. Balance Exercise  2. Bed Mobility  3. Family Education  4.  Gait Training  5. Home Exercise Program (HEP)  6. Neuromuscular Re-education/Strengthening  7. Range of Motion (ROM)  8. Therapeutic Activites  9. Therapeutic Exercise/Strengthening  10. Transfer Training     TREATMENT PLAN: Frequency/Duration: 3 times a week for duration of hospital stay  Rehabilitation Potential For Stated Goals: Good     REHAB RECOMMENDATIONS (at time of discharge pending progress):    Placement: It is my opinion, based on this patient's performance to date, that Mr. Mckeon may benefit from OUTPATIENT THERAPY after discharge due to the functional deficits listed above that are likely to improve with skilled rehabilitation because because he/she is able to safely attend regular and reoccurring therapy sessions at an outpatient facility and because he/she will benefit from the individualized approach tailored to his/her deficits. Equipment:    None at this time              HISTORY:   History of Present Injury/Illness (Reason for Referral):  Per H&P: \"The patient is a 62-year-old gentleman. He is status post right hip arthroplasty in the past, did quite well with this. He developed similar symptoms to his left hip and x-ray examination was consistent with degenerative arthritis of the left hip. We discussed different treatment options. He has opted for total hip arthroplasty. We discussed the dual-mobility Smith and Nephew type. He understands the risks and complications inherent and wished to proceed. He also understands the risks inherent with any arthroplasty to include but not limited to infection, dislocation, wearing out which may necessitate surgery, leg length inequality, blood clots, blood loss, and other less common but potentially serious complication that may occur. Informed consent was obtained. \" Pt now s/p LLE EJ, WBAT LLE  Past Medical History/Comorbidities:   Mr. Luly Larson  has a past medical history of Arthritis, Cellulitis and abscess of trunk (December, 2009), Chronic pain, Depression, Ex-smoker for more than 1 year, Hypertension, Insomnia, Left shoulder pain, PE (pulmonary thromboembolism) ([facility] [address] (2007), Renal failure (12/10/2009), S/P lobectomy of lung, and Thromboembolus ([facility] [address] (2009, 2011). Mr. Mark Anthony Ray  has a past surgical history that includes hx hip replacement (2007); hx thoracotomy (December, 14, 2009); hx lobectomy (12/01/2009); hx gi (April, 2008); hx orthopaedic (Right, 2007); hx orthopaedic (Left, 03/2018); pr chest surgery procedure unlisted (2009); pr chest surgery procedure unlisted; vascular surgery procedure unlist (2009); and hx lumbar fusion (08/2017). Social History/Living Environment:   Home Environment: Private residence  # Steps to Enter: 4  Rails to Enter: No  One/Two Story Residence: One story  Living Alone: No(will be going to stay with his girlfriend following surgery)  Support Systems: Spouse/Significant Other/Partner, Child(bee), Family member(s)  Patient Expects to be Discharged to[de-identified] Private residence  Current DME Used/Available at Home: Levonne Abelson, rolling  Tub or Shower Type: Shower  Prior Level of Function/Work/Activity:  Independent, active. Number of Personal Factors/Comorbidities that affect the Plan of Care: 1-2: MODERATE COMPLEXITY   EXAMINATION:   Most Recent Physical Functioning:   Gross Assessment:  AROM: Within functional limits  Strength: Generally decreased, functional  Sensation: Intact               Posture:     Balance:  Sitting: Intact  Standing: Impaired  Standing - Static: Fair  Standing - Dynamic : Fair Bed Mobility:  Rolling: Contact guard assistance  Supine to Sit: Minimum assistance  Sit to Supine: Minimum assistance  Scooting: Minimum assistance  Interventions: Safety awareness training; Tactile cues; Verbal cues  Wheelchair Mobility:     Transfers:  Sit to Stand: Minimum assistance;Assist x1;Assist x2  Stand to Sit: Minimum assistance  Bed to Chair: Contact guard assistance;Minimum assistance  Interventions: Safety awareness training; Tactile cues; Verbal cues  Gait:  Left Side Weight Bearing: As tolerated  Base of Support: Center of gravity altered;Shift to right  Speed/Lillie: Shuffled; Slow  Step Length: Right shortened;Left shortened  Stance: Left decreased;Time;Weight shift  Gait Abnormalities: Decreased step clearance; Antalgic; Path deviations;Trunk sway increased; Shuffling gait  Distance (ft): 100 Feet (ft)  Assistive Device: Walker, rolling;Gait belt  Ambulation - Level of Assistance: Contact guard assistance      Body Structures Involved:  1. Nerves  2. Bones  3. Joints  4. Muscles Body Functions Affected:  1. Neuromusculoskeletal  2. Movement Related Activities and Participation Affected:  1. General Tasks and Demands  2. Mobility   Number of elements that affect the Plan of Care: 3: MODERATE COMPLEXITY   CLINICAL PRESENTATION:   Presentation: Stable and uncomplicated: LOW COMPLEXITY   CLINICAL DECISION MAKIN[address] [place] [id number] AM-PAC 6 Clicks   Basic Mobility Inpatient Short Form  How much difficulty does the patient currently have. .. Unable A Lot A Little None   1. Turning over in bed (including adjusting bedclothes, sheets and blankets)? [] 1   [] 2   [x] 3   [] 4   2. Sitting down on and standing up from a chair with arms ( e.g., wheelchair, bedside commode, etc.)   [] 1   [] 2   [x] 3   [] 4   3. Moving from lying on back to sitting on the side of the bed? [] 1   [] 2   [x] 3   [] 4   How much help from another person does the patient currently need. .. Total A Lot A Little None   4. Moving to and from a bed to a chair (including a wheelchair)? [] 1   [] 2   [x] 3   [] 4   5. Need to walk in hospital room? [] 1   [] 2   [x] 3   [] 4   6. Climbing 3-5 steps with a railing? [] 1   [] 2   [x] 3   [] 4   © , Trustees of [address] [place] [id number], under license to [employer].  All rights reserved      Score:  Initial: 18 Most Recent: X (Date: -- )    Interpretation of Tool:  Represents activities that are increasingly more difficult (i.e. Bed mobility, Transfers, Gait). Medical Necessity:     · Patient is expected to demonstrate progress in   · strength, range of motion, balance, coordination, and functional technique  ·  to   · increase independence with all mobility and promote return to prior level of function. · .  Reason for Services/Other Comments:  · Patient continues to require skilled intervention due to   · medical complications and mobility deficits which impact his level of function, safety, and independence as indicated above. · .   Use of outcome tool(s) and clinical judgement create a POC that gives a: Clear prediction of patient's progress: LOW COMPLEXITY        TREATMENT:   (In addition to Assessment/Re-Assessment sessions the following treatments were rendered)   Pre-treatment Symptoms/Complaints:  none  Pain: Initial:   Pain Intensity 1: 5  Pain Location 1: Hip  Pain Orientation 1: Left  Post Session:  5/10 LLE hip pain     Gait Training (  24 Minutes):  Pre-gait activities including sit <> stand transfers, supported standing and weight shifting with BUE support at RW, as well as gait training to improve and/or restore physical functioning as related to mobility, strength, balance and coordination. Ambulated 100 Feet (ft) with Contact guard assistance using a Walker, rolling;Gait belt and minimal  cues related to their stance phase, stride length, push off and heel strike to promote proper body alignment, promote proper body posture, promote proper body mechanics and promote proper body breathing techniques.     Braces/Orthotics/Lines/Etc:   · IV  · O2 Device: Room Air  Treatment/Session Assessment:    · Response to Treatment:  See above  · Interdisciplinary Collaboration:   o Physical Therapist  o Registered Nurse  o Rehabilitation Attendant  · After treatment position/precautions:   o Supine in bed  o Bed/Chair-wheels locked  o Bed in low position  o Call light within reach  o RN notified  o Family at bedside   · Compliance with Program/Exercises: Will assess as treatment progresses  · Recommendations/Intent for next treatment session: \"Next visit will focus on advancements to more challenging activities and reduction in assistance provided\".     Total Treatment Duration:  PT Patient Time In/Time Out  Time In: 1516  Time Out: [address], PT

## 2020-03-02 NOTE — PROGRESS NOTES
03/02/20 1330   Dual Skin Pressure Injury Assessment   Dual Skin Pressure Injury Assessment WDL   Second Care Provider (Based on [address] [place] [address]) Debra Pascual RN   Skin Integumentary   Skin Integumentary (WDL) X   Skin Integrity Incision (comment)  (left hip)

## 2020-03-02 NOTE — H&P
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 The patient comes in today for an evaluation, history and physical, and surgical consent signing. The surgical procedure was reviewed in detail with the patient. The risks, including but not limited to anesthesia, infection, deep vein thrombosis, pulmonary embolus, injury to vessels, tendons, and nerves, paralysis, stroke, heart attack, loss of limb, and death were discussed. The patient understands the postoperative course and all questions were answered. No guarantees are made and all alternatives are given. The patient wishes to proceed with the surgery. Appropriate literature and relevant material were reviewed with the patient. Surgical procedure: Left EJ       The patient presents today ambulatory without assistance. He is unaccompanied today.          Developmental history: Left hip X-ray 2 views 1/29/2020,     Family health history: diabetes (father)     Major events: Right EJ 2007; pulmonary embolism right lung 2007; hiatal hernia repair 2008; right leg DVT; bronchoscopy; colonoscopy; right thoracotomy 2007; right shoulder arthroscopy with RCR, acromioplasty and labral debridement 12/17/13; left shoulder scope w/ SAD, labral debridement, biceps tenodesis, and partial RCR 3/6/18     Nutrition history: Not recorded     Ongoing medical problems: a history of DVT and subsequent PE; GERD; testicular hypofunction; hyperplasia of the prostate; anxiety     Preventive care: Not recorded     Social history: Denies Tobacco use; EtOH use described as occasional         REVIEW OF SYSTEMS: General: Denies weight change, generally healthy, change in strength or exercise tolerance     Head: Denies headaches, vertigo, injury     Eyes: Denies changes in vision     Ears:  Denies change in hearing     Nose: Denies epistaxis, obstruction, discharge     Mouth: Denies dental difficulties     Neck: Denies pain or stiffness     Chest: Denies cough or SOB     Heart: Denies chest pains, palpitations     Abdomen: Denies change in appetite, constipation or diarrhea     : Denies urinary urgency, dysuria, changes in the nature of urine     Neurologic: Denies weakness, seizures or paralysis     Psychiatric: Denies depressive symptoms, changes in sleep habits, changes in thought content     Cognitive: denies short/long term memory or word recollection problems       Objective  Vital Signs: Height 72 inches; Weight 243 lbs; /93 mmHg; Temp 97.7 F; Pulse 68 bpm; Oxygen Saturation 95 %    The patient is a 57-year-old male who appears his given age and is in no apparent distress. Oriented to person, place and time. Mood and affect are normal and appropriate to the situation. Assessment of respiratory effort reveals even and nonlabored respirations. He exhibits a severely antalgic, reciprocal gait and is able to get on and off the table without assistance. GEN: NAD        The lungs clear to auscultation bilaterally. Heart rate regular without murmur heard to auscultation. Pelvis X-Rays (AP & left Lat Hip views - CPT 84933) taken today revealed:   Severe DJD of the left hip. No acute osseous abnormality. Left Hip Examination:   The inspection reveals no external signs of injury. No warmth or erythema noted. No palpable cords. The palpation of the hip reveals diffuse tenderness to the left hip. The hip ROM is within functional limits. There is pain throughout the arc of motion. The muscle strength is 5/5.    The muscle tone is normal.   Vascular: Peripheral pulses normal 2/2 lower extremities. Neurologic: Sensation is intact and symmetrical in all dermatome's lower extremities. Deep tendon reflexes are normal Coordination is good  Coordination is good    Assessment    Plan  We discussed the pathophysiology of the diagnosis and options for treatment. We reviewed the conservative treatment options in detail. We discussed the surgical option(s) (Left EJ). We have talked about the complications of the surgery, including the possibility of damage to nerves, arteries, vessels, and tendons, bleeding, infection, the possibility of sustaining medical problems, even death. We have talked about the possibility that the condition may not improve after surgery or that it could actually be worse. The patient seems to understand and accept these possible complications. The patient understands and wishes to proceed with surgery at this time. Informed surgical consent obtained. Surgery will be performed at [facility] on 3/2/2020. The patient is going home with his girlfriend after discharge. All questions answered at this time. The patient knows to contact the office with any questions or concerns. VERIFICATION OF ANCILLARY DOCUMENTATION: The portions of the chart completed by ancillary personnel were reviewed by the physician. RTC: For post-op    Current Medication   Hydrocodone-Acetaminophen (Norco) 5-325 MG Oral Tablet Si tablet every 6 hours as needed for pain.   Nebivolol HCl (Bystolic) 10 MG Oral Tablet Sig: Take 1 tablet (10 mg) by mouth daily  Rivaroxaban (Xarelto) 10 MG Oral Tablet Sig: Take 1 tablet (10 mg) by mouth daily with food  testosterone (Depo-Testosterone) cypionate 200 mg/mL intramuscular solution Sig: inject IM once monthly  Vortioxetine HBr (Trintellix) 10 MG Oral Tablet Sig: Take 1 tablet (10 mg) by mouth daily

## 2020-03-02 NOTE — H&P
History and Physical Updated with no interval change.  Wesley Reynoso MD H&P Update: No change since previous exam.    Emani Selby M.D.  3/2/2020

## 2020-03-02 NOTE — ANESTHESIA PREPROCEDURE EVALUATION
Anesthetic History   No history of anesthetic complications            Review of Systems / Medical History  Patient summary reviewed and nursing notes reviewed    Pulmonary          Smoker (quit 18 years)      Comments: H/o RLL , for aspergillis, 2009   Neuro/Psych   Within defined limits           Cardiovascular    Hypertension: well controlled              Exercise tolerance: >4 METS  Comments: Off coumadin for 4 days on lovenox   GI/Hepatic/Renal  Within defined limits             Comments: S/p Nissen Endo/Other        Obesity and arthritis     Other Findings   Comments: Patient has h/o DVT/PE. Physical Exam    Airway  Mallampati: II  TM Distance: > 6 cm  Neck ROM: normal range of motion   Mouth opening: Normal     Cardiovascular    Rhythm: regular  Rate: normal         Dental  No notable dental hx    Comments: Chipped front teeth   Pulmonary  Breath sounds clear to auscultation               Abdominal  GI exam deferred       Other Findings            Anesthetic Plan    ASA: 3  Anesthesia type: general          Induction: Intravenous  Anesthetic plan and risks discussed with: Patient and Spouse      Sub Q heparin given.  ASA 325mg given; will do general.

## 2020-03-03 LAB — HGB BLD-MCNC: 12.5 G/DL (ref 13.6–17.2)

## 2020-03-03 PROCEDURE — 74011250636 HC RX REV CODE- 250/636: Performed by: ORTHOPAEDIC SURGERY

## 2020-03-03 PROCEDURE — 97116 GAIT TRAINING THERAPY: CPT

## 2020-03-03 PROCEDURE — 65270000029 HC RM PRIVATE

## 2020-03-03 PROCEDURE — 85018 HEMOGLOBIN: CPT

## 2020-03-03 PROCEDURE — 97535 SELF CARE MNGMENT TRAINING: CPT

## 2020-03-03 PROCEDURE — 97110 THERAPEUTIC EXERCISES: CPT

## 2020-03-03 PROCEDURE — 97165 OT EVAL LOW COMPLEX 30 MIN: CPT

## 2020-03-03 PROCEDURE — 74011250637 HC RX REV CODE- 250/637: Performed by: ORTHOPAEDIC SURGERY

## 2020-03-03 PROCEDURE — 36415 COLL VENOUS BLD VENIPUNCTURE: CPT

## 2020-03-03 RX ADMIN — OXYCODONE HYDROCHLORIDE AND ACETAMINOPHEN 1 TABLET: 10; 325 TABLET ORAL at 05:38

## 2020-03-03 RX ADMIN — Medication 5 ML: at 21:46

## 2020-03-03 RX ADMIN — OXYCODONE HYDROCHLORIDE AND ACETAMINOPHEN 1 TABLET: 10; 325 TABLET ORAL at 09:25

## 2020-03-03 RX ADMIN — NEBIVOLOL HYDROCHLORIDE 5 MG: 5 TABLET ORAL at 08:13

## 2020-03-03 RX ADMIN — Medication 2 G: at 02:48

## 2020-03-03 RX ADMIN — CELECOXIB 200 MG: 200 CAPSULE ORAL at 08:13

## 2020-03-03 RX ADMIN — HYDROMORPHONE HYDROCHLORIDE 1 MG: 1 INJECTION, SOLUTION INTRAMUSCULAR; INTRAVENOUS; SUBCUTANEOUS at 11:15

## 2020-03-03 RX ADMIN — OXYCODONE HYDROCHLORIDE AND ACETAMINOPHEN 1 TABLET: 10; 325 TABLET ORAL at 14:31

## 2020-03-03 RX ADMIN — HYDROMORPHONE HYDROCHLORIDE 1 MG: 1 INJECTION, SOLUTION INTRAMUSCULAR; INTRAVENOUS; SUBCUTANEOUS at 21:46

## 2020-03-03 RX ADMIN — DEXAMETHASONE SODIUM PHOSPHATE 10 MG: 10 INJECTION INTRAMUSCULAR; INTRAVENOUS at 13:00

## 2020-03-03 RX ADMIN — RIVAROXABAN 20 MG: 20 TABLET, FILM COATED ORAL at 08:13

## 2020-03-03 RX ADMIN — OXYCODONE HYDROCHLORIDE AND ACETAMINOPHEN 1 TABLET: 10; 325 TABLET ORAL at 18:42

## 2020-03-03 RX ADMIN — Medication 10 ML: at 02:49

## 2020-03-03 RX ADMIN — CELECOXIB 200 MG: 200 CAPSULE ORAL at 21:47

## 2020-03-03 RX ADMIN — SENNOSIDES AND DOCUSATE SODIUM 2 TABLET: 8.6; 5 TABLET ORAL at 08:13

## 2020-03-03 RX ADMIN — Medication 10 ML: at 14:14

## 2020-03-03 RX ADMIN — Medication 1 AMPULE: at 21:47

## 2020-03-03 RX ADMIN — HYDROMORPHONE HYDROCHLORIDE 1 MG: 1 INJECTION, SOLUTION INTRAMUSCULAR; INTRAVENOUS; SUBCUTANEOUS at 02:48

## 2020-03-03 RX ADMIN — Medication 1 AMPULE: at 08:13

## 2020-03-03 NOTE — PROGRESS NOTES
Problem: Self Care Deficits Care Plan (Adult)  Goal: *Acute Goals and Plan of Care (Insert Text)  Description  1. Patient will verbalize and demonstrate understanding of hip precautions with 100% accuracy during ADL. 2. Patient will complete functional transfers with modified independence and adaptive equipment as needed. 3. Patient will complete lower body bathing and dressing with modified inepdendence and adaptive equipment as needed. 4. Patient will complete toileting with modified independence. 5. Patient will tolerate at least 20 minutes of BUE therapeutic exercises to increase strength in BUE to aid in functional transfers. 6. Patient will tolerate at least 20 minutes of OT treatment with no rest breaks to increase activity tolerance for ADLs. Timeframe: 7 visits      Outcome: Progressing Towards Goal       OCCUPATIONAL THERAPY: Initial Assessment and Daily Note 3/3/2020  INPATIENT: OT Visit Days: 1  Payor: BLUE CROSS / [place] [employer] [place] / Product Type: [facility] /      NAME/AGE/GENDER: Richar Mac is a 60 y.o. male   PRIMARY DIAGNOSIS:  Osteoarthritis of left hip, unspecified osteoarthritis type [M16.12]  Osteoarthritis of left hip [M16.12] Osteoarthritis of left hip Osteoarthritis of left hip  Procedure(s) (LRB):  LEFT TOTAL HIP ARTHROPLASTY (Left)  1 Day Post-Op  ICD-10: Treatment Diagnosis:    Pain in left hip (M25.552)  Stiffness of Left Hip, Not elsewhere classified (M25.652)   Precautions/Allergies:    Full WB LLE   Hip Precautions    Adhesive      ASSESSMENT:     Mr. Shaan Tejada is a 60year old male who is s/p L EJ and full WB in LLE. At baseline he lives alone, but is planning to move in with his girlfriend after discharge. He is typically independent with all ADLs, IADls, and driving. He works a job that requires a lot of walking. Patient supine in bed upon arrival, agreeable to OT evaluation. Reports pain 7/10. BUE assessment reveals ROM, strength are WNL.  Balance is intact in sitting, impaired in standing. Patient is currently functioning below his independent baseline due to EJ. He would benefit from continued occupational therapy to increase independence and safety. Will follow. Treatment initiated to include education on hip precautions and ADL activity. Patient provided with written handout and verbal/ visual explanation. He was instructed on use of reacher for lower body dressing and was able to don athletic shorts with minimal assistance using reacher. Patient states he has sock aid and reacher from previous surgeries and is comfortable with how to use them. He also demonstrated upper body dressing with setup. Patient able to stand with minimal assistance and take steps to chair with CGA and RW. Required minimal assistance and verbal cues for controlled descent into chair. This section established at most recent assessment   PROBLEM LIST (Impairments causing functional limitations):  Decreased ADL/Functional Activities  Decreased Transfer Abilities  Decreased Ambulation Ability/Technique  Decreased Balance  Increased Pain  Decreased Activity Tolerance  Decreased Flexibility/Joint Mobility  Decreased Knowledge of Precautions   INTERVENTIONS PLANNED: (Benefits and precautions of occupational therapy have been discussed with the patient.)  Activities of daily living training  Adaptive equipment training  Neuromuscular re-eduation  Therapeutic activity  Therapeutic exercise     TREATMENT PLAN: Frequency/Duration: Follow patient 4x/ week to address above goals. Rehabilitation Potential For Stated Goals: Good     REHAB RECOMMENDATIONS (at time of discharge pending progress):    Placement: It is my opinion, based on this patient's performance to date, that Mr. Mckeon may benefit from participating in 1-2 additional therapy sessions in order to continue to assess for rehab potential and then make recommendation for disposition at discharge.   Equipment:   None at this time OCCUPATIONAL PROFILE AND HISTORY:   History of Present Injury/Illness (Reason for Referral):  EJ   Past Medical History/Comorbidities:   Mr. Kaley Briceño  has a past medical history of Arthritis, Cellulitis and abscess of trunk (December, 2009), Chronic pain, Depression, Ex-smoker for more than 1 year, Hypertension, Insomnia, Left shoulder pain, PE (pulmonary thromboembolism) ([facility] [address] (2007), Renal failure (12/10/2009), S/P lobectomy of lung, and Thromboembolus ([facility] [address] (2009, 2011). Mr. Kaley Briceño  has a past surgical history that includes hx hip replacement (2007); hx thoracotomy (December, 14, 2009); hx lobectomy (12/01/2009); hx gi (April, 2008); hx orthopaedic (Right, 2007); hx orthopaedic (Left, 03/2018); pr chest surgery procedure unlisted (2009); pr chest surgery procedure unlisted; vascular surgery procedure unlist (2009); and hx lumbar fusion (08/2017). Social History/Living Environment:   Home Environment: Private residence  # Steps to Enter: 4  Rails to Enter: No  One/Two Story Residence: One story  Living Alone: No(will be going to stay with his girlfriend following surgery)  Support Systems: Spouse/Significant Other/Partner, Child(bee), Family member(s)  Patient Expects to be Discharged to[de-identified] Private residence  Current DME Used/Available at Home: Walker, rolling, Raised toilet seat, Adaptive dressing aides, Shower chair  Tub or Shower Type: Shower  Prior Level of Function/Work/Activity:  At baseline he lives alone, but is planning to move in with his girlfriend after discharge. He is typically independent with all ADLs, IADls, and driving. He works a job that requires a lot of walking. Number of Personal Factors/Comorbidities that affect the Plan of Care: Brief history (0):  LOW COMPLEXITY   ASSESSMENT OF OCCUPATIONAL PERFORMANCE[de-identified]   Activities of Daily Living:   Basic ADLs (From Assessment) Complex ADLs (From Assessment)   Feeding: Setup  Oral Facial Hygiene/Grooming: Setup  Bathing:  Moderate assistance  Upper Body Dressing: Setup  Lower Body Dressing: Moderate assistance  Toileting: Minimum assistance Instrumental ADL  Meal Preparation: Moderate assistance  Homemaking: Moderate assistance  Medication Management: Independent  Financial Management: Independent   Grooming/Bathing/Dressing Activities of Daily Living     Cognitive Retraining  Safety/Judgement: Awareness of environment; Fall prevention; Insight into deficits               Upper Body Dressing Assistance  Dressing Assistance: Set-up  Pullover Shirt: Set-up     Lower Body Dressing Assistance  Pants With Elastic Waist: Minimum assistance; Compensatory technique training  Leg Crossed Method Used: No  Position Performed: Seated edge of bed  Cues: Don;Verbal cues provided;Physical assistance  Adaptive Equipment Used: Reacher Bed/Mat Mobility  Supine to Sit: Minimum assistance; Additional time  Sit to Stand: Minimum assistance  Stand to Sit: Minimum assistance  Bed to Chair: Contact guard assistance  Scooting: Contact guard assistance     Most Recent Physical Functioning:   Gross Assessment:  AROM: Within functional limits  Strength: Within functional limits               Posture:     Balance:  Sitting: Intact  Standing: Impaired  Standing - Static: Good  Standing - Dynamic : Fair Bed Mobility:  Supine to Sit: Minimum assistance; Additional time  Scooting: Contact guard assistance  Wheelchair Mobility:     Transfers:  Sit to Stand: Minimum assistance  Stand to Sit: Minimum assistance  Bed to Chair: Contact guard assistance            Patient Vitals for the past 6 hrs:   BP BP Patient Position SpO2 O2 Flow Rate (L/min) Pulse   03/03/20 1130 119/71 At rest 95 % -- 63   03/03/20 1223 -- -- -- 0 l/min --   03/03/20 1500 153/83 At rest 95 % -- 60       Mental Status  Neurologic State: Alert  Orientation Level: Oriented X4  Cognition: Follows commands, Appropriate decision making  Perception: Appears intact  Perseveration: No perseveration noted  Safety/Judgement: Awareness of environment, Fall prevention, Insight into deficits                          Physical Skills Involved:  Range of Motion  Balance  Strength  Activity Tolerance  Pain (acute) Cognitive Skills Affected (resulting in the inability to perform in a timely and safe manner):  NONE   Psychosocial Skills Affected:  Habits/Routines  Environmental Adaptation   Number of elements that affect the Plan of Care: 5+:  HIGH COMPLEXITY   CLINICAL DECISION MAKIN[address] [place] [id number] AM-PAC 6 Clicks   Daily Activity Inpatient Short Form  How much help from another person does the patient currently need. .. Total A Lot A Little None   1. Putting on and taking off regular lower body clothing? [] 1   [x] 2   [] 3   [] 4   2. Bathing (including washing, rinsing, drying)? [] 1   [x] 2   [] 3   [] 4   3. Toileting, which includes using toilet, bedpan or urinal?   [] 1   [] 2   [x] 3   [] 4   4. Putting on and taking off regular upper body clothing? [] 1   [] 2   [] 3   [x] 4   5. Taking care of personal grooming such as brushing teeth? [] 1   [] 2   [] 3   [x] 4   6. Eating meals? [] 1   [] 2   [] 3   [x] 4   © , Trustees of [address] [place] [address], under license to [employer]. All rights reserved      Score:  Initial: 19 Most Recent: X (Date: -- )    Interpretation of Tool:  Represents activities that are increasingly more difficult (i.e. Bed mobility, Transfers, Gait). Medical Necessity:     Patient demonstrates   good   rehab potential due to higher previous functional level. Reason for Services/Other Comments:  Patient   continues to require present interventions due to patient's inability to care for self while maintaining hip precautions.    .   Use of outcome tool(s) and clinical judgement create a POC that gives a: MODERATE COMPLEXITY         TREATMENT:   (In addition to Assessment/Re-Assessment sessions the following treatments were rendered)     Pre-treatment Symptoms/Complaints:    Pain: Initial:   Pain Intensity 1: 7  Pain Intervention(s) 1: Ice  Post Session:  same. Ice pack applied, already got pain meds prior to OT arrival.    Self Care: (10 minutes): Procedure(s) (per grid) utilized to improve and/or restore self-care/home management as related to upper and lower body dressing and functional mobility/ transfers . Required minimal verbal cueing to facilitate activities of daily living skills and compensatory activities. Treatment initiated to include education on hip precautions and ADL activity. Patient provided with written handout and verbal/ visual explanation. He was instructed on use of reacher for lower body dressing and was able to don athletic shorts with minimal assistance using reacher. Patient states he has sock aid and reacher from previous surgeries and is comfortable with how to use them. He also demonstrated upper body dressing with setup. Patient able to stand with minimal assistance and take steps to chair with CGA and RW. Required minimal assistance and verbal cues for controlled descent into chair. Braces/Orthotics/Lines/Etc:   IV  O2 Device: Room air  Treatment/Session Assessment:    Response to Treatment:  tolerated well   Interdisciplinary Collaboration:   Occupational Therapist  Registered Nurse    After treatment position/precautions:   Up in chair  Bed/Chair-wheels locked  Bed in low position  Call light within reach  RN notified   Compliance with Program/Exercises: Compliant all of the time. Recommendations/Intent for next treatment session: \"Next visit will focus on advancements to more challenging activities and reduction in assistance provided\".   Total Treatment Duration:  OT Patient Time In/Time Out  Time In: 0942  Time Out: [id number] [facility] [address], OTR/L

## 2020-03-03 NOTE — PROGRESS NOTES
Problem: Mobility Impaired (Adult and Pediatric)  Goal: *Acute Goals and Plan of Care  Description  Acute PT Goals:  (1.) Mr. Kinga Liu will move from supine to sit and sit to supine , scoot up and down and roll side to side with MODIFIED INDEPENDENCE within 7 treatment day(s). (2.) Mr. Kinga Liu will transfer from bed to chair and chair to bed WBAT LLE with MODIFIED INDEPENDENCE using the least restrictive device within 7 treatment day(s). (3.) Mr. Kinga Liu will ambulate with MODIFIED INDEPENDENCE for 500 feet with the least restrictive device within 7 treatment day(s). (4.) Mr. Kinga Liu will perform standing static and dynamic balance activities x 25 minutes WBAT LLE  with MODIFIED INDEPENDENCE to improve safety within 7 treatment day(s). (5.) Mr. Kinga Liu will ascend and descend 4 stairs using no hand rail(s) with MODIFIED INDEPENDENCE to improve functional mobility and safety within 7 treatment day(s). (6.) Mr. Kinga Liu will perform bilateral lower extremity exercises x 15 min for HEP with INDEPENDENCE to improve strength, endurance, and functional mobility within 7 treatment day(s). PHYSICAL THERAPY: Daily Note and PM 3/3/2020  INPATIENT: PT Visit Days : 2  Payor: Joycelyn Bread / Plan[place] [place] / Product Type: Saul Tucker /       NAME/AGE/GENDER: Jerry Mast is a 60 y.o. male   PRIMARY DIAGNOSIS: Osteoarthritis of left hip, unspecified osteoarthritis type [M16.12]  Osteoarthritis of left hip [M16.12] Osteoarthritis of left hip Osteoarthritis of left hip  Procedure(s) (LRB):  LEFT TOTAL HIP ARTHROPLASTY (Left)  1 Day Post-Op  ICD-10: Treatment Diagnosis:   · Difficulty in walking, Not elsewhere classified (R26.2)  · Other abnormalities of gait and mobility (R26.89)   Precaution/Allergies:  Adhesive      ASSESSMENT:                                                                                        WBAT LLE    Mr. Kinga Liu is a 60year old M who presents s/p LLE EJ.  Prior to hospital admission pt lives alone, but will be moving in with his girlfriend follow surgery, in a one story home with four step(s) to enter. Pt endorses no falls in past 6 months. Prior to admission Mr. Ba Rideloy uses no DME for mobility, he does have a rolling walker availability for mobility following surgery. Pt presents sitting up in chair on contact and is very agreeable to therapy, is quite motivated. Pt stood and ambulated 250' with rolling walker and SBA. Pt working on a more fluid movement and step thru gait pattern. Pt returned to room and performed below LE exercises and then returned supine in bed with min/CGA. Pt with improved gait quality and distance today. Will continue with POC. This section established at most recent assessment   PROBLEM LIST (Impairments causing functional limitations):  1. Decreased Strength  2. Decreased ADL/Functional Activities  3. Decreased Transfer Abilities  4. Decreased Ambulation Ability/Technique  5. Decreased Balance  6. Increased Pain  7. Decreased Flexibility/Joint Mobility   INTERVENTIONS PLANNED: (Benefits and precautions of physical therapy have been discussed with the patient.)  1. Balance Exercise  2. Bed Mobility  3. Family Education  4. Gait Training  5. Home Exercise Program (HEP)  6. Neuromuscular Re-education/Strengthening  7. Range of Motion (ROM)  8. Therapeutic Activites  9. Therapeutic Exercise/Strengthening  10. Transfer Training     TREATMENT PLAN: Frequency/Duration: 3 times a week for duration of hospital stay  Rehabilitation Potential For Stated Goals: Good     REHAB RECOMMENDATIONS (at time of discharge pending progress):    Placement: It is my opinion, based on this patient's performance to date, that Mr. Mckeon may benefit from OUTPATIENT THERAPY after discharge due to the functional deficits listed above that are likely to improve with skilled rehabilitation because because he/she is able to safely attend regular and reoccurring therapy sessions at an outpatient facility and because he/she will benefit from the individualized approach tailored to his/her deficits. Equipment:    None at this time              HISTORY:   History of Present Injury/Illness (Reason for Referral):  Per H&P: \"The patient is a 79-year-old gentleman. He is status post right hip arthroplasty in the past, did quite well with this. He developed similar symptoms to his left hip and x-ray examination was consistent with degenerative arthritis of the left hip. We discussed different treatment options. He has opted for total hip arthroplasty. We discussed the dual-mobility Smith and Nephew type. He understands the risks and complications inherent and wished to proceed. He also understands the risks inherent with any arthroplasty to include but not limited to infection, dislocation, wearing out which may necessitate surgery, leg length inequality, blood clots, blood loss, and other less common but potentially serious complication that may occur. Informed consent was obtained. \" Pt now s/p LLE EJ, WBAT LLE  Past Medical History/Comorbidities:   Mr. Shaan Tejada  has a past medical history of Arthritis, Cellulitis and abscess of trunk (December, 2009), Chronic pain, Depression, Ex-smoker for more than 1 year, Hypertension, Insomnia, Left shoulder pain, PE (pulmonary thromboembolism) ([facility] [address] (2007), Renal failure (12/10/2009), S/P lobectomy of lung, and Thromboembolus ([facility] [address] (2009, 2011). Mr. Shaan Tejada  has a past surgical history that includes hx hip replacement (2007); hx thoracotomy (December, 14, 2009); hx lobectomy (12/01/2009); hx gi (April, 2008); hx orthopaedic (Right, 2007); hx orthopaedic (Left, 03/2018); pr chest surgery procedure unlisted (2009); pr chest surgery procedure unlisted; vascular surgery procedure unlist (2009); and hx lumbar fusion (08/2017).   Social History/Living Environment:   Home Environment: Private residence  # Steps to Enter: 4  Rails to Enter: No  One/Two Story Residence: One story  Living Alone: No(will be going to stay with his girlfriend following surgery)  Support Systems: Spouse/Significant Other/Partner, Child(bee), Family member(s)  Patient Expects to be Discharged to[de-identified] Private residence  Current DME Used/Available at Home: Walker, rolling, Raised toilet seat, Adaptive dressing aides, Shower chair  Tub or Shower Type: Shower  Prior Level of Function/Work/Activity:  Independent, active. Number of Personal Factors/Comorbidities that affect the Plan of Care: 1-2: MODERATE COMPLEXITY   EXAMINATION:   Most Recent Physical Functioning:   Gross Assessment:                  Posture:     Balance:  Sitting: Intact  Standing - Static: Good  Standing - Dynamic : Fair(+) Bed Mobility:     Wheelchair Mobility:     Transfers:  Sit to Stand: Contact guard assistance  Stand to Sit: Contact guard assistance  Bed to Chair: Contact guard assistance  Gait:  Left Side Weight Bearing: As tolerated  Base of Support: Center of gravity altered;Shift to right  Speed/Lillie: Pace decreased (<100 feet/min)  Step Length: Left shortened;Right shortened  Gait Abnormalities: Decreased step clearance; Path deviations;Trunk sway increased  Distance (ft): 250 Feet (ft)  Assistive Device: Walker, rolling  Ambulation - Level of Assistance: Stand-by assistance      Body Structures Involved:  1. Nerves  2. Bones  3. Joints  4. Muscles Body Functions Affected:  1. Neuromusculoskeletal  2. Movement Related Activities and Participation Affected:  1. General Tasks and Demands  2. Mobility   Number of elements that affect the Plan of Care: 3: MODERATE COMPLEXITY   CLINICAL PRESENTATION:   Presentation: Stable and uncomplicated: LOW COMPLEXITY   CLINICAL DECISION MAKIN [facility] [address] [address] AM-PAC 6 Clicks   Basic Mobility Inpatient Short Form  How much difficulty does the patient currently have. .. Unable A Lot A Little None   1. Turning over in bed (including adjusting bedclothes, sheets and blankets)?    [] 1   [] 2 [x] 3   [] 4   2. Sitting down on and standing up from a chair with arms ( e.g., wheelchair, bedside commode, etc.)   [] 1   [] 2   [x] 3   [] 4   3. Moving from lying on back to sitting on the side of the bed? [] 1   [] 2   [x] 3   [] 4   How much help from another person does the patient currently need. .. Total A Lot A Little None   4. Moving to and from a bed to a chair (including a wheelchair)? [] 1   [] 2   [x] 3   [] 4   5. Need to walk in hospital room? [] 1   [] 2   [x] 3   [] 4   6. Climbing 3-5 steps with a railing? [] 1   [] 2   [x] 3   [] 4   © 2007, Trustees of [address] [place], [place] [address], under license to [employer]. All rights reserved      Score:  Initial: 18 Most Recent: X (Date: -- )    Interpretation of Tool:  Represents activities that are increasingly more difficult (i.e. Bed mobility, Transfers, Gait). Medical Necessity:     · Patient is expected to demonstrate progress in   · strength, range of motion, balance, coordination, and functional technique  ·  to   · increase independence with all mobility and promote return to prior level of function. · .  Reason for Services/Other Comments:  · Patient continues to require skilled intervention due to   · medical complications and mobility deficits which impact his level of function, safety, and independence as indicated above.    · .   Use of outcome tool(s) and clinical judgement create a POC that gives a: Clear prediction of patient's progress: LOW COMPLEXITY        TREATMENT:   (In addition to Assessment/Re-Assessment sessions the following treatments were rendered)   Pre-treatment Symptoms/Complaints:  none  Pain: Initial:      Post Session:  5/10 LLE hip pain     Gait Training (  17 Minutes):  Pre-gait activities including sit <> stand transfers, supported standing and weight shifting with BUE support at [address] [place] [address], as well as gait training to improve and/or restore physical functioning as related to mobility, strength, balance and coordination. Ambulated 250 Feet (ft) with Stand-by assistance using a Walker, rolling and minimal  cues related to their stance phase, stride length, push off and heel strike to promote proper body alignment, promote proper body posture, promote proper body mechanics and promote proper body breathing techniques. Therapeutic Exercise: ( 10 minutes):  Exercises per grid below to improve mobility and strength. Required minimal visual and verbal cues to promote proper body alignment, promote proper body posture and promote proper body mechanics. Progressed range and repetitions as indicated. Date:  3/3/20 Date:   Date:     ACTIVITY/EXERCISE AM PM AM PM AM PM   Ambulation:           Distance  Device  Duration         Seated Heel Raises X 20 B X 25 B       Seated Toe Raises X 20 B X 25 B       Seated Long Arc Quads X 15 B X 20 B       Seated Marching X 10 L in standing X 15 L in standing       Seated Hip Abduction X 10 L in standing X 15 L in standing                B = bilateral; AA = active assistive; A = active; P = passive        Braces/Orthotics/Lines/Etc:   · IV  · O2 Device: Room Air  Treatment/Session Assessment:    · Response to Treatment:  See above  · Interdisciplinary Collaboration:   o Physical Therapy Assistant  o Registered Nurse  · After treatment position/precautions:   o Supine in bed  o Bed/Chair-wheels locked  o Bed in low position  o Call light within reach  o RN notified   · Compliance with Program/Exercises: Will assess as treatment progresses  · Recommendations/Intent for next treatment session: \"Next visit will focus on advancements to more challenging activities and reduction in assistance provided\".     Total Treatment Duration:  PT Patient Time In/Time Out  Time In: 1340  Time Out: 1600 [address]

## 2020-03-03 NOTE — PROGRESS NOTES
Problem: Mobility Impaired (Adult and Pediatric)  Goal: *Acute Goals and Plan of Care  Description  Acute PT Goals:  (1.) Mr. Sunita De La Vega will move from supine to sit and sit to supine , scoot up and down and roll side to side with MODIFIED INDEPENDENCE within 7 treatment day(s). (2.) Mr. Sunita De La Vega will transfer from bed to chair and chair to bed WBAT LLE with MODIFIED INDEPENDENCE using the least restrictive device within 7 treatment day(s). (3.) Mr. Sunita De La Vega will ambulate with MODIFIED INDEPENDENCE for 500 feet with the least restrictive device within 7 treatment day(s). (4.) Mr. Sunita De La Vega will perform standing static and dynamic balance activities x 25 minutes WBAT LLE  with MODIFIED INDEPENDENCE to improve safety within 7 treatment day(s). (5.) Mr. Sunita De La Vega will ascend and descend 4 stairs using no hand rail(s) with MODIFIED INDEPENDENCE to improve functional mobility and safety within 7 treatment day(s). (6.) Mr. Sunita De La Vega will perform bilateral lower extremity exercises x 15 min for HEP with INDEPENDENCE to improve strength, endurance, and functional mobility within 7 treatment day(s). PHYSICAL THERAPY: Daily Note and AM 3/3/2020  INPATIENT: PT Visit Days : 2  Payor: Rene Yusuf / [place] [place] / Product Type: Gleen Copper /       NAME/AGE/GENDER: Queta Barnes is a 60 y.o. male   PRIMARY DIAGNOSIS: Osteoarthritis of left hip, unspecified osteoarthritis type [M16.12]  Osteoarthritis of left hip [M16.12] Osteoarthritis of left hip Osteoarthritis of left hip  Procedure(s) (LRB):  LEFT TOTAL HIP ARTHROPLASTY (Left)  1 Day Post-Op  ICD-10: Treatment Diagnosis:   · Difficulty in walking, Not elsewhere classified (R26.2)  · Other abnormalities of gait and mobility (R26.89)   Precaution/Allergies:  Adhesive      ASSESSMENT:                                                                                        WBAT LLE    Mr. Sunita De La Vega is a 60year old M who presents s/p LLE EJ.  Prior to hospital admission pt lives alone, but will be moving in with his girlfriend follow surgery, in a one story home with four step(s) to enter. Pt endorses no falls in past 6 months. Prior to admission Mr. Viktoriya Aviles uses no DME for mobility, he does have a rolling walker availability for mobility following surgery. Pt presents sitting up in chair on contact and is very agreeable to therapy. Pt stood with CGA while kicking his L LE out prior to standing to follow hip precautions. Pt was able to increase his ambulation to 250' using rolling walker and SBA/CGA. Pt with decreased step length and given cues for pacing and posture. Pt returned to room and performed below LE exercises including standing and sitting. Pt was left sitting up in chair with needs in reach. Pt is making good progress towards goals with increased ambualtion distance and decreased assist required. Will continue with POC. This section established at most recent assessment   PROBLEM LIST (Impairments causing functional limitations):  1. Decreased Strength  2. Decreased ADL/Functional Activities  3. Decreased Transfer Abilities  4. Decreased Ambulation Ability/Technique  5. Decreased Balance  6. Increased Pain  7. Decreased Flexibility/Joint Mobility   INTERVENTIONS PLANNED: (Benefits and precautions of physical therapy have been discussed with the patient.)  1. Balance Exercise  2. Bed Mobility  3. Family Education  4. Gait Training  5. Home Exercise Program (HEP)  6. Neuromuscular Re-education/Strengthening  7. Range of Motion (ROM)  8. Therapeutic Activites  9. Therapeutic Exercise/Strengthening  10. Transfer Training     TREATMENT PLAN: Frequency/Duration: 3 times a week for duration of hospital stay  Rehabilitation Potential For Stated Goals: Good     REHAB RECOMMENDATIONS (at time of discharge pending progress):    Placement: It is my opinion, based on this patient's performance to date, that Mr. Mckeon may benefit from OUTPATIENT THERAPY after discharge due to the functional deficits listed above that are likely to improve with skilled rehabilitation because because he/she is able to safely attend regular and reoccurring therapy sessions at an outpatient facility and because he/she will benefit from the individualized approach tailored to his/her deficits. Equipment:    None at this time              HISTORY:   History of Present Injury/Illness (Reason for Referral):  Per H&P: \"The patient is a 72-year-old gentleman. He is status post right hip arthroplasty in the past, did quite well with this. He developed similar symptoms to his left hip and x-ray examination was consistent with degenerative arthritis of the left hip. We discussed different treatment options. He has opted for total hip arthroplasty. We discussed the dual-mobility Smith and Nephew type. He understands the risks and complications inherent and wished to proceed. He also understands the risks inherent with any arthroplasty to include but not limited to infection, dislocation, wearing out which may necessitate surgery, leg length inequality, blood clots, blood loss, and other less common but potentially serious complication that may occur. Informed consent was obtained. \" Pt now s/p LLE EJ, WBAT LLE  Past Medical History/Comorbidities:   Mr. Elly Bernal  has a past medical history of Arthritis, Cellulitis and abscess of trunk (December, 2009), Chronic pain, Depression, Ex-smoker for more than 1 year, Hypertension, Insomnia, Left shoulder pain, PE (pulmonary thromboembolism) ([address] (2007), Renal failure (12/10/2009), S/P lobectomy of lung, and Thromboembolus ([address] (2009, 2011).   Mr. Elly Bernal  has a past surgical history that includes hx hip replacement (2007); hx thoracotomy (December, 14, 2009); hx lobectomy (12/01/2009); hx gi (April, 2008); hx orthopaedic (Right, 2007); hx orthopaedic (Left, 03/2018); pr chest surgery procedure unlisted (2009); pr chest surgery procedure unlisted; vascular surgery procedure unlist (2009); and hx lumbar fusion (2017). Social History/Living Environment:   Home Environment: Private residence  # Steps to Enter: 4  Rails to Enter: No  One/Two Story Residence: One story  Living Alone: No(will be going to stay with his girlfriend following surgery)  Support Systems: Spouse/Significant Other/Partner, Child(bee), Family member(s)  Patient Expects to be Discharged to[de-identified] Private residence  Current DME Used/Available at Home: Walker, rolling, Raised toilet seat, Adaptive dressing aides, Shower chair  Tub or Shower Type: Shower  Prior Level of Function/Work/Activity:  Independent, active. Number of Personal Factors/Comorbidities that affect the Plan of Care: 1-2: MODERATE COMPLEXITY   EXAMINATION:   Most Recent Physical Functioning:   Gross Assessment:                  Posture:     Balance:  Sitting: Intact  Standing - Static: Good  Standing - Dynamic : Fair(+) Bed Mobility:     Wheelchair Mobility:     Transfers:  Sit to Stand: Contact guard assistance  Stand to Sit: Contact guard assistance  Bed to Chair: Contact guard assistance  Gait:  Left Side Weight Bearing: As tolerated  Base of Support: Center of gravity altered;Shift to right  Speed/Lillie: Pace decreased (<100 feet/min)  Step Length: Left shortened;Right shortened  Gait Abnormalities: Decreased step clearance; Path deviations;Trunk sway increased  Distance (ft): 250 Feet (ft)  Assistive Device: Walker, rolling  Ambulation - Level of Assistance: Stand-by assistance      Body Structures Involved:  1. Nerves  2. Bones  3. Joints  4. Muscles Body Functions Affected:  1. Neuromusculoskeletal  2. Movement Related Activities and Participation Affected:  1. General Tasks and Demands  2.  Mobility   Number of elements that affect the Plan of Care: 3: MODERATE COMPLEXITY   CLINICAL PRESENTATION:   Presentation: Stable and uncomplicated: LOW COMPLEXITY   CLINICAL DECISION MAKIN \[facility] [place] [facility]\"" [address] [address] [address] Inpatient Short Form  How much difficulty does the patient currently have. .. Unable A Lot A Little None   1. Turning over in bed (including adjusting bedclothes, sheets and blankets)? [] 1   [] 2   [x] 3   [] 4   2. Sitting down on and standing up from a chair with arms ( e.g., wheelchair, bedside commode, etc.)   [] 1   [] 2   [x] 3   [] 4   3. Moving from lying on back to sitting on the side of the bed? [] 1   [] 2   [x] 3   [] 4   How much help from another person does the patient currently need. .. Total A Lot A Little None   4. Moving to and from a bed to a chair (including a wheelchair)? [] 1   [] 2   [x] 3   [] 4   5. Need to walk in hospital room? [] 1   [] 2   [x] 3   [] 4   6. Climbing 3-5 steps with a railing? [] 1   [] 2   [x] 3   [] 4   © 2007, Trustees of [address] [place] [address], under license to [employer]. All rights reserved      Score:  Initial: 18 Most Recent: X (Date: -- )    Interpretation of Tool:  Represents activities that are increasingly more difficult (i.e. Bed mobility, Transfers, Gait). Medical Necessity:     · Patient is expected to demonstrate progress in   · strength, range of motion, balance, coordination, and functional technique  ·  to   · increase independence with all mobility and promote return to prior level of function. · .  Reason for Services/Other Comments:  · Patient continues to require skilled intervention due to   · medical complications and mobility deficits which impact his level of function, safety, and independence as indicated above.    · .   Use of outcome tool(s) and clinical judgement create a POC that gives a: Clear prediction of patient's progress: LOW COMPLEXITY        TREATMENT:   (In addition to Assessment/Re-Assessment sessions the following treatments were rendered)   Pre-treatment Symptoms/Complaints:  none  Pain: Initial:      Post Session:  5/10 LLE hip pain     Gait Training (  18 Minutes):  Pre-gait activities including sit <> stand transfers, supported standing and weight shifting with BUE support at , as well as gait training to improve and/or restore physical functioning as related to mobility, strength, balance and coordination. Ambulated 250 Feet (ft) with Stand-by assistance using a Walker, rolling and minimal  cues related to their stance phase, stride length, push off and heel strike to promote proper body alignment, promote proper body posture, promote proper body mechanics and promote proper body breathing techniques. Therapeutic Exercise: ( 9 minutes):  Exercises per grid below to improve mobility and strength. Required minimal visual and verbal cues to promote proper body alignment, promote proper body posture and promote proper body mechanics. Progressed range and repetitions as indicated. Date:  3/3/20 Date:   Date:     ACTIVITY/EXERCISE AM PM AM PM AM PM   Ambulation:           Distance  Device  Duration         Seated Heel Raises X 20 B        Seated Toe Raises X 20 B        Seated Long Arc Quads X 15 B        Seated Marching X 10 L in standing        Seated Hip Abduction X 10 L in standing                 B = bilateral; AA = active assistive; A = active; P = passive        Braces/Orthotics/Lines/Etc:   · IV  · O2 Device: Room Air  Treatment/Session Assessment:    · Response to Treatment:  See above  · Interdisciplinary Collaboration:   o Physical Therapy Assistant  o Registered Nurse  · After treatment position/precautions:   o Up in chair  o Bed/Chair-wheels locked  o Bed in low position  o Call light within reach  o RN notified   · Compliance with Program/Exercises: Will assess as treatment progresses  · Recommendations/Intent for next treatment session: \"Next visit will focus on advancements to more challenging activities and reduction in assistance provided\".     Total Treatment Duration:  PT Patient Time In/Time Out  Time In: 1045  Time Out: 7[address], PTA

## 2020-03-03 NOTE — PROGRESS NOTES
Problem: Falls - Risk of  Goal: *Absence of Falls  Description  Document Aileen Latin Fall Risk and appropriate interventions in the flowsheet.   Outcome: Progressing Towards Goal  Note: Fall Risk Interventions:  Mobility Interventions: Patient to call before getting OOB         Medication Interventions: Patient to call before getting OOB    Elimination Interventions: Call light in reach, Patient to call for help with toileting needs              Problem: Patient Education: Go to Patient Education Activity  Goal: Patient/Family Education  Outcome: Progressing Towards Goal     Problem: Patient Education: Go to Patient Education Activity  Goal: Patient/Family Education  Outcome: Progressing Towards Goal     Problem: Patient Education: Go to Patient Education Activity  Goal: Patient/Family Education  Outcome: Progressing Towards Goal     Problem: Hip Replacement: Post Op Day 1  Goal: Off Pathway (Use only if patient is Off Pathway)  Outcome: Progressing Towards Goal  Goal: Activity/Safety  Outcome: Progressing Towards Goal  Goal: Diagnostic Test/Procedures  Outcome: Progressing Towards Goal  Goal: Nutrition/Diet  Outcome: Progressing Towards Goal  Goal: Medications  Outcome: Progressing Towards Goal  Goal: Respiratory  Outcome: Progressing Towards Goal  Goal: Treatments/Interventions/Procedures  Outcome: Progressing Towards Goal  Goal: Psychosocial  Outcome: Progressing Towards Goal  Goal: Discharge Planning  Outcome: Progressing Towards Goal  Goal: *Demonstrates progressive activity  Outcome: Progressing Towards Goal  Goal: *Optimal pain control at patient's stated goal  Outcome: Progressing Towards Goal  Goal: *Hemodynamically stable  Outcome: Progressing Towards Goal  Goal: *Discharge plan identified  Outcome: Progressing Towards Goal     Problem: Hip Replacement: Post-Op Day 2  Goal: Off Pathway (Use only if patient is Off Pathway)  Outcome: Progressing Towards Goal  Goal: Activity/Safety  Outcome: Progressing Towards Goal  Goal: Diagnostic Test/Procedures  Outcome: Progressing Towards Goal  Goal: Nutrition/Diet  Outcome: Progressing Towards Goal  Goal: Medications  Outcome: Progressing Towards Goal  Goal: Respiratory  Outcome: Progressing Towards Goal  Goal: Treatments/Interventions/Procedures  Outcome: Progressing Towards Goal  Goal: Psychosocial  Outcome: Progressing Towards Goal  Goal: *Met physical therapy criteria for discharge to the next level of care  Outcome: Progressing Towards Goal  Goal: *Optimal pain control with oral analgesia  Outcome: Progressing Towards Goal  Goal: *Hemodynamically stable  Outcome: Progressing Towards Goal  Goal: *Tolerating diet  Outcome: Progressing Towards Goal  Goal: *Verbalizes understanding of any indicated hip precautions  Outcome: Progressing Towards Goal  Goal: *Patient verbalizes understanding of discharge instructions  Outcome: Progressing Towards Goal     Problem: Pain  Goal: *Control of Pain  Outcome: Progressing Towards Goal     Problem: Patient Education: Go to Patient Education Activity  Goal: Patient/Family Education  Outcome: Progressing Towards Goal

## 2020-03-03 NOTE — PROGRESS NOTES
CM met with patient for initial assessment. Pt was alert and oriented. Pt states he lives in a mobile home alone, but after the hospital visit, he will be going to live with his gf for the supportive care/needs. GF lives in a one level house with 5 CINTHIA, no steps inside of the home. Pt states he was independent at baseline before hospital visit. He owns a walker and BSC. Demographics, emergency contact, and insurance confirmed. CM will continue to follow for discharge/supportive needs.      Care Management Interventions  PCP Verified by CM: Yes(Jose Juan Romo )  Transition of Care Consult (CM Consult): Discharge Planning  Discharge Durable Medical Equipment: No  Physical Therapy Consult: Yes  Occupational Therapy Consult: Yes  Current Support Network: Own Home, Lives with Spouse(Pt states that he lives alone but after the hospital visit, he will be going to live with his GF)  Discharge Location  Discharge Placement: Unable to determine at this time

## 2020-03-04 PROCEDURE — 65270000029 HC RM PRIVATE

## 2020-03-04 PROCEDURE — 74011250636 HC RX REV CODE- 250/636: Performed by: ORTHOPAEDIC SURGERY

## 2020-03-04 PROCEDURE — 97530 THERAPEUTIC ACTIVITIES: CPT

## 2020-03-04 PROCEDURE — 74011250637 HC RX REV CODE- 250/637: Performed by: ORTHOPAEDIC SURGERY

## 2020-03-04 PROCEDURE — 97110 THERAPEUTIC EXERCISES: CPT

## 2020-03-04 RX ADMIN — CELECOXIB 200 MG: 200 CAPSULE ORAL at 08:08

## 2020-03-04 RX ADMIN — HYDROMORPHONE HYDROCHLORIDE 1 MG: 1 INJECTION, SOLUTION INTRAMUSCULAR; INTRAVENOUS; SUBCUTANEOUS at 05:36

## 2020-03-04 RX ADMIN — CELECOXIB 200 MG: 200 CAPSULE ORAL at 21:20

## 2020-03-04 RX ADMIN — OXYCODONE HYDROCHLORIDE AND ACETAMINOPHEN 1 TABLET: 10; 325 TABLET ORAL at 12:26

## 2020-03-04 RX ADMIN — OXYCODONE HYDROCHLORIDE AND ACETAMINOPHEN 1 TABLET: 10; 325 TABLET ORAL at 08:08

## 2020-03-04 RX ADMIN — Medication 1 AMPULE: at 08:07

## 2020-03-04 RX ADMIN — OXYCODONE HYDROCHLORIDE AND ACETAMINOPHEN 1 TABLET: 10; 325 TABLET ORAL at 01:23

## 2020-03-04 RX ADMIN — SENNOSIDES AND DOCUSATE SODIUM 2 TABLET: 8.6; 5 TABLET ORAL at 08:08

## 2020-03-04 RX ADMIN — Medication 10 ML: at 21:30

## 2020-03-04 RX ADMIN — NEBIVOLOL HYDROCHLORIDE 5 MG: 5 TABLET ORAL at 08:08

## 2020-03-04 RX ADMIN — Medication 15 ML: at 05:36

## 2020-03-04 RX ADMIN — Medication 10 ML: at 14:59

## 2020-03-04 RX ADMIN — RIVAROXABAN 20 MG: 20 TABLET, FILM COATED ORAL at 08:08

## 2020-03-04 RX ADMIN — OXYCODONE HYDROCHLORIDE AND ACETAMINOPHEN 1 TABLET: 10; 325 TABLET ORAL at 21:20

## 2020-03-04 RX ADMIN — HYDROMORPHONE HYDROCHLORIDE 1 MG: 1 INJECTION, SOLUTION INTRAMUSCULAR; INTRAVENOUS; SUBCUTANEOUS at 16:04

## 2020-03-04 RX ADMIN — Medication 1 AMPULE: at 21:20

## 2020-03-04 NOTE — INTERDISCIPLINARY ROUNDS
Interdisciplinary team rounds were held 3/4/2020 with the following team members:  Care Management, Nursing, Physical Therapy and . Plan of care discussed. See clinical pathway and/or care plan for interventions and desired outcomes. Anticipate discharge home tomorrow.

## 2020-03-04 NOTE — PROGRESS NOTES
Problem: Mobility Impaired (Adult and Pediatric)  Goal: *Acute Goals and Plan of Care  Description  Acute PT Goals:  (1.) Mr. Irina Brock will move from supine to sit and sit to supine , scoot up and down and roll side to side with MODIFIED INDEPENDENCE within 7 treatment day(s). (2.) Mr. Irina Brock will transfer from bed to chair and chair to bed WBAT LLE with MODIFIED INDEPENDENCE using the least restrictive device within 7 treatment day(s). (3.) Mr. Irina Brock will ambulate with MODIFIED INDEPENDENCE for 500 feet with the least restrictive device within 7 treatment day(s). (4.) Mr. Irina Brock will perform standing static and dynamic balance activities x 25 minutes WBAT LLE  with MODIFIED INDEPENDENCE to improve safety within 7 treatment day(s). (5.) Mr. Irina Brock will ascend and descend 4 stairs using no hand rail(s) with MODIFIED INDEPENDENCE to improve functional mobility and safety within 7 treatment day(s). (6.) Mr. Irina Brock will perform bilateral lower extremity exercises x 15 min for HEP with INDEPENDENCE to improve strength, endurance, and functional mobility within 7 treatment day(s). PHYSICAL THERAPY: Daily Note and PM 3/4/2020  INPATIENT: PT Visit Days : 3  Payor: Gabriel Calderon / Plan: [place] [employer] [employer] [place] / Product Type: Jeny Bills /       NAME/AGE/GENDER: Miriam Fisher is a 60 y.o. male   PRIMARY DIAGNOSIS: Osteoarthritis of left hip, unspecified osteoarthritis type [M16.12]  Osteoarthritis of left hip [M16.12] Osteoarthritis of left hip Osteoarthritis of left hip  Procedure(s) (LRB):  LEFT TOTAL HIP ARTHROPLASTY (Left)  2 Days Post-Op  ICD-10: Treatment Diagnosis:   · Difficulty in walking, Not elsewhere classified (R26.2)  · Other abnormalities of gait and mobility (R26.89)   Precaution/Allergies:  Adhesive      ASSESSMENT:                                                                                        WBAT LLE    Mr. Irina Brock is a 60year old M who presents s/p LLE EJ.  Prior to hospital admission pt lives alone, but will be moving in with his girlfriend follow surgery, in a one story home with four step(s) to enter. Pt endorses no falls in past 6 months. Prior to admission Mr. Liya Gibbons uses no DME for mobility, he does have a rolling walker availability for mobility following surgery. Pt presents sitting up in chair on contact, ready to get up walking. Pt stood and ambulated about 900' in manning with one short standing rest break using rolling walker and SBA/Supervision. Pt with much smoother ambulation with step thru gait pattern. Pt returned to room, ambulated into the bathroom, then ambulated back to bed. Pt returned supine in bed with min/CGA for L LE. Pt was positioned to comfort, ice pack to L hip, and needs in reach. Pt is making great progress towards goals with increased ambulation distance and improved gait quality. Will continue with POC. This section established at most recent assessment   PROBLEM LIST (Impairments causing functional limitations):  1. Decreased Strength  2. Decreased ADL/Functional Activities  3. Decreased Transfer Abilities  4. Decreased Ambulation Ability/Technique  5. Decreased Balance  6. Increased Pain  7. Decreased Flexibility/Joint Mobility   INTERVENTIONS PLANNED: (Benefits and precautions of physical therapy have been discussed with the patient.)  1. Balance Exercise  2. Bed Mobility  3. Family Education  4. Gait Training  5. Home Exercise Program (HEP)  6. Neuromuscular Re-education/Strengthening  7. Range of Motion (ROM)  8. Therapeutic Activites  9. Therapeutic Exercise/Strengthening  10. Transfer Training     TREATMENT PLAN: Frequency/Duration: 3 times a week for duration of hospital stay  Rehabilitation Potential For Stated Goals: Good     REHAB RECOMMENDATIONS (at time of discharge pending progress):    Placement: It is my opinion, based on this patient's performance to date, that Mr. Mckeon may benefit from OUTPATIENT THERAPY after discharge due to the functional deficits listed above that are likely to improve with skilled rehabilitation because because he/she is able to safely attend regular and reoccurring therapy sessions at an outpatient facility and because he/she will benefit from the individualized approach tailored to his/her deficits. Equipment:    None at this time              HISTORY:   History of Present Injury/Illness (Reason for Referral):  Per H&P: \"The patient is a 81-year-old gentleman. He is status post right hip arthroplasty in the past, did quite well with this. He developed similar symptoms to his left hip and x-ray examination was consistent with degenerative arthritis of the left hip. We discussed different treatment options. He has opted for total hip arthroplasty. We discussed the dual-mobility Smith and Nephew type. He understands the risks and complications inherent and wished to proceed. He also understands the risks inherent with any arthroplasty to include but not limited to infection, dislocation, wearing out which may necessitate surgery, leg length inequality, blood clots, blood loss, and other less common but potentially serious complication that may occur. Informed consent was obtained. \" Pt now s/p LLE EJ, WBAT LLE  Past Medical History/Comorbidities:   Mr. Joya Garcia  has a past medical history of Arthritis, Cellulitis and abscess of trunk (December, 2009), Chronic pain, Depression, Ex-smoker for more than 1 year, Hypertension, Insomnia, Left shoulder pain, PE (pulmonary thromboembolism) ([address] (2007), Renal failure (12/10/2009), S/P lobectomy of lung, and Thromboembolus ([address] (2009, 2011).   Mr. Joya Garcia  has a past surgical history that includes hx hip replacement (2007); hx thoracotomy (December, 14, 2009); hx lobectomy (12/01/2009); hx gi (April, 2008); hx orthopaedic (Right, 2007); hx orthopaedic (Left, 03/2018); pr chest surgery procedure unlisted (2009); pr chest surgery procedure unlisted; vascular surgery procedure unlist (2009); and hx lumbar fusion (08/2017). Social History/Living Environment:   Home Environment: Private residence  # Steps to Enter: 4  Rails to Enter: No  One/Two Story Residence: One story  Living Alone: No(will be going to stay with his girlfriend following surgery)  Support Systems: Spouse/Significant Other/Partner, Child(bee), Family member(s)  Patient Expects to be Discharged to[de-identified] Private residence  Current DME Used/Available at Home: Walker, rolling, Raised toilet seat, Adaptive dressing aides, Shower chair  Tub or Shower Type: Shower  Prior Level of Function/Work/Activity:  Independent, active. Number of Personal Factors/Comorbidities that affect the Plan of Care: 1-2: MODERATE COMPLEXITY   EXAMINATION:   Most Recent Physical Functioning:   Gross Assessment:                  Posture:     Balance:  Sitting: Intact  Standing - Static: Good  Standing - Dynamic : Fair(+) Bed Mobility:  Supine to Sit: Stand-by assistance;Contact guard assistance  Scooting: Modified independent  Wheelchair Mobility:     Transfers:  Sit to Stand: Stand-by assistance  Stand to Sit: Stand-by assistance  Gait:  Left Side Weight Bearing: As tolerated  Base of Support: Center of gravity altered;Shift to right  Speed/Lillie: Pace decreased (<100 feet/min)  Step Length: Left shortened;Right shortened  Gait Abnormalities: Decreased step clearance  Distance (ft): 900 Feet (ft)  Assistive Device: Walker, rolling  Ambulation - Level of Assistance: Stand-by assistance  Number of Stairs Trained: 3  Stairs - Level of Assistance: Stand-by assistance  Rail Use: Both      Body Structures Involved:  1. Nerves  2. Bones  3. Joints  4. Muscles Body Functions Affected:  1. Neuromusculoskeletal  2. Movement Related Activities and Participation Affected:  1. General Tasks and Demands  2.  Mobility   Number of elements that affect the Plan of Care: 3: MODERATE COMPLEXITY   CLINICAL PRESENTATION:   Presentation: Stable and uncomplicated: LOW COMPLEXITY CLINICAL DECISION MAKIN[address] [place] [id number] AM-PAC 6 Clicks   Basic Mobility Inpatient Short Form  How much difficulty does the patient currently have. .. Unable A Lot A Little None   1. Turning over in bed (including adjusting bedclothes, sheets and blankets)? [] 1   [] 2   [x] 3   [] 4   2. Sitting down on and standing up from a chair with arms ( e.g., wheelchair, bedside commode, etc.)   [] 1   [] 2   [x] 3   [] 4   3. Moving from lying on back to sitting on the side of the bed? [] 1   [] 2   [x] 3   [] 4   How much help from another person does the patient currently need. .. Total A Lot A Little None   4. Moving to and from a bed to a chair (including a wheelchair)? [] 1   [] 2   [x] 3   [] 4   5. Need to walk in hospital room? [] 1   [] 2   [x] 3   [] 4   6. Climbing 3-5 steps with a railing? [] 1   [] 2   [x] 3   [] 4   © 2007, Trustees of [address] [place] [id number], under license to [employer]. All rights reserved      Score:  Initial: 18 Most Recent: X (Date: -- )    Interpretation of Tool:  Represents activities that are increasingly more difficult (i.e. Bed mobility, Transfers, Gait). Medical Necessity:     · Patient is expected to demonstrate progress in   · strength, range of motion, balance, coordination, and functional technique  ·  to   · increase independence with all mobility and promote return to prior level of function. · .  Reason for Services/Other Comments:  · Patient continues to require skilled intervention due to   · medical complications and mobility deficits which impact his level of function, safety, and independence as indicated above.    · .   Use of outcome tool(s) and clinical judgement create a POC that gives a: Clear prediction of patient's progress: LOW COMPLEXITY        TREATMENT:   (In addition to Assessment/Re-Assessment sessions the following treatments were rendered)   Pre-treatment Symptoms/Complaints:  none  Pain: Initial:   Pain Intensity 1: 4  Post Session:  5/10 LLE hip pain     Gait Training (  0 Minutes):  Pre-gait activities including sit <> stand transfers, supported standing and weight shifting with BUE support at RW, as well as gait training to improve and/or restore physical functioning as related to mobility, strength, balance and coordination. Ambulated 900 Feet (ft) with Stand-by assistance using a Walker, rolling and minimal  cues related to their stance phase, stride length, push off and heel strike to promote proper body alignment, promote proper body posture, promote proper body mechanics and promote proper body breathing techniques. Therapeutic Activity: (    28 minutes): Therapeutic activities including Bed transfers, toilet transfers, Chair transfers and Ambulation on level ground to improve mobility and strength. Required minimal   to promote dynamic balance in standing. Therapeutic Exercise: ( 0 minutes):  Exercises per grid below to improve mobility and strength. Required minimal visual and verbal cues to promote proper body alignment, promote proper body posture and promote proper body mechanics. Progressed range and repetitions as indicated.      Date:  3/3/20 Date:  3/4/20 Date:     ACTIVITY/EXERCISE AM PM AM PM AM PM   Ambulation:           Distance  Device  Duration         Seated Heel Raises X 20 B X 25 B X 25 B      Seated Toe Raises X 20 B X 25 B X 25 B      Seated Long Arc Quads X 15 B X 20 B  X 20 L      Seated Marching X 10 L in standing X 15 L in standing X 15 L in standing      Seated Hip Abduction X 10 L in standing X 15 L in standing X 15 L in standing               B = bilateral; AA = active assistive; A = active; P = passive        Braces/Orthotics/Lines/Etc:   · IV  · O2 Device: Room Air  Treatment/Session Assessment:    · Response to Treatment:  See above  · Interdisciplinary Collaboration:   o Physical Therapy Assistant  o Registered Nurse  · After treatment position/precautions:   o Supine in bed  o Bed/Chair-wheels locked  o Bed in low position  o Call light within reach  o RN notified   · Compliance with Program/Exercises: Will assess as treatment progresses  · Recommendations/Intent for next treatment session: \"Next visit will focus on advancements to more challenging activities and reduction in assistance provided\".     Total Treatment Duration:  PT Patient Time In/Time Out  Time In: 1330  Time Out: [address], PTA

## 2020-03-04 NOTE — PROGRESS NOTES
Problem: Mobility Impaired (Adult and Pediatric)  Goal: *Acute Goals and Plan of Care  Description  Acute PT Goals:  (1.) Mr. James Thorne will move from supine to sit and sit to supine , scoot up and down and roll side to side with MODIFIED INDEPENDENCE within 7 treatment day(s). (2.) Mr. James Thorne will transfer from bed to chair and chair to bed WBAT LLE with MODIFIED INDEPENDENCE using the least restrictive device within 7 treatment day(s). (3.) Mr. James Thorne will ambulate with MODIFIED INDEPENDENCE for 500 feet with the least restrictive device within 7 treatment day(s). (4.) Mr. James Thorne will perform standing static and dynamic balance activities x 25 minutes WBAT LLE  with MODIFIED INDEPENDENCE to improve safety within 7 treatment day(s). (5.) Mr. James Thorne will ascend and descend 4 stairs using no hand rail(s) with MODIFIED INDEPENDENCE to improve functional mobility and safety within 7 treatment day(s). (6.) Mr. James Thorne will perform bilateral lower extremity exercises x 15 min for HEP with INDEPENDENCE to improve strength, endurance, and functional mobility within 7 treatment day(s). PHYSICAL THERAPY: Daily Note and AM 3/4/2020  INPATIENT: PT Visit Days : 3  Payor: Kylah Corral / Plan: [place] [employer] / Product Type: [employer] /       NAME/AGE/GENDER: Sheree Lynn is a 60 y.o. male   PRIMARY DIAGNOSIS: Osteoarthritis of left hip, unspecified osteoarthritis type [M16.12]  Osteoarthritis of left hip [M16.12] Osteoarthritis of left hip Osteoarthritis of left hip  Procedure(s) (LRB):  LEFT TOTAL HIP ARTHROPLASTY (Left)  2 Days Post-Op  ICD-10: Treatment Diagnosis:   · Difficulty in walking, Not elsewhere classified (R26.2)  · Other abnormalities of gait and mobility (R26.89)   Precaution/Allergies:  Adhesive      ASSESSMENT:                                                                                        WBAT LLE    Mr. James Thorne is a 60year old M who presents s/p LLE EJ.  Prior to hospital admission pt lives alone, but will be moving in with his girlfriend follow surgery, in a one story home with four step(s) to enter. Pt endorses no falls in past 6 months. Prior to admission Mr. Luly Larson uses no DME for mobility, he does have a rolling walker availability for mobility following surgery. Pt presents supine in bed and is very agreeable to therapy. Pt stood and ambulated to the sink to brush his teeth, worked on standing balance. Pt ambulated into the bathroom, voided, and then amb to sink to wash his hands. Pt was able to ambulate about 450' in manning using rolling walker and SBA. Pt with improved gait pattern with more fluid movement and step thru gait. Pt negotiated up and down 3 steps with CGA using both hand rails. Pt returned to room and performed below LE exercises. Pt is making good progress towards goals with increased ambulation and negotiating up and down stairs. Pt left up in recliner with needs in reach. Will continue with POC. This section established at most recent assessment   PROBLEM LIST (Impairments causing functional limitations):  1. Decreased Strength  2. Decreased ADL/Functional Activities  3. Decreased Transfer Abilities  4. Decreased Ambulation Ability/Technique  5. Decreased Balance  6. Increased Pain  7. Decreased Flexibility/Joint Mobility   INTERVENTIONS PLANNED: (Benefits and precautions of physical therapy have been discussed with the patient.)  1. Balance Exercise  2. Bed Mobility  3. Family Education  4. Gait Training  5. Home Exercise Program (HEP)  6. Neuromuscular Re-education/Strengthening  7. Range of Motion (ROM)  8. Therapeutic Activites  9. Therapeutic Exercise/Strengthening  10. Transfer Training     TREATMENT PLAN: Frequency/Duration: 3 times a week for duration of hospital stay  Rehabilitation Potential For Stated Goals: Good     REHAB RECOMMENDATIONS (at time of discharge pending progress):    Placement:   It is my opinion, based on this patient's performance to date, that Mr. Mckeon may benefit from OUTPATIENT THERAPY after discharge due to the functional deficits listed above that are likely to improve with skilled rehabilitation because because he/she is able to safely attend regular and reoccurring therapy sessions at an outpatient facility and because he/she will benefit from the individualized approach tailored to his/her deficits. Equipment:    None at this time              HISTORY:   History of Present Injury/Illness (Reason for Referral):  Per H&P: \"The patient is a 68-year-old gentleman. He is status post right hip arthroplasty in the past, did quite well with this. He developed similar symptoms to his left hip and x-ray examination was consistent with degenerative arthritis of the left hip. We discussed different treatment options. He has opted for total hip arthroplasty. We discussed the dual-mobility Smith and Nephew type. He understands the risks and complications inherent and wished to proceed. He also understands the risks inherent with any arthroplasty to include but not limited to infection, dislocation, wearing out which may necessitate surgery, leg length inequality, blood clots, blood loss, and other less common but potentially serious complication that may occur. Informed consent was obtained. \" Pt now s/p LLE EJ, WBAT LLE  Past Medical History/Comorbidities:   Mr. Lisa Arce  has a past medical history of Arthritis, Cellulitis and abscess of trunk (December, 2009), Chronic pain, Depression, Ex-smoker for more than 1 year, Hypertension, Insomnia, Left shoulder pain, PE (pulmonary thromboembolism) ([address] (2007), Renal failure (12/10/2009), S/P lobectomy of lung, and Thromboembolus ([address] (2009, 2011).   Mr. Lisa Arce  has a past surgical history that includes hx hip replacement (2007); hx thoracotomy (December, 14, 2009); hx lobectomy (12/01/2009); hx gi (April, 2008); hx orthopaedic (Right, 2007); hx orthopaedic (Left, 03/2018); pr chest surgery procedure unlisted (2009); pr chest surgery procedure unlisted; vascular surgery procedure unlist (2009); and hx lumbar fusion (08/2017). Social History/Living Environment:   Home Environment: Private residence  # Steps to Enter: 4  Rails to Enter: No  One/Two Story Residence: One story  Living Alone: No(will be going to stay with his girlfriend following surgery)  Support Systems: Spouse/Significant Other/Partner, Child(bee), Family member(s)  Patient Expects to be Discharged to[de-identified] Private residence  Current DME Used/Available at Home: Walker, rolling, Raised toilet seat, Adaptive dressing aides, Shower chair  Tub or Shower Type: Shower  Prior Level of Function/Work/Activity:  Independent, active. Number of Personal Factors/Comorbidities that affect the Plan of Care: 1-2: MODERATE COMPLEXITY   EXAMINATION:   Most Recent Physical Functioning:   Gross Assessment:                  Posture:     Balance:  Sitting: Intact  Standing - Static: Good  Standing - Dynamic : Fair(+) Bed Mobility:  Supine to Sit: Stand-by assistance;Contact guard assistance  Scooting: Modified independent  Wheelchair Mobility:     Transfers:  Sit to Stand: Stand-by assistance  Stand to Sit: Stand-by assistance  Gait:  Left Side Weight Bearing: As tolerated  Base of Support: Center of gravity altered;Shift to right  Speed/Lillie: Pace decreased (<100 feet/min)  Step Length: Left shortened;Right shortened  Gait Abnormalities: Decreased step clearance  Distance (ft): 450 Feet (ft)  Assistive Device: Walker, rolling  Ambulation - Level of Assistance: Stand-by assistance  Number of Stairs Trained: 3  Stairs - Level of Assistance: Contact guard assistance  Rail Use: Both      Body Structures Involved:  1. Nerves  2. Bones  3. Joints  4. Muscles Body Functions Affected:  1. Neuromusculoskeletal  2. Movement Related Activities and Participation Affected:  1. General Tasks and Demands  2.  Mobility   Number of elements that affect the Plan of Care: 3: MODERATE COMPLEXITY   CLINICAL PRESENTATION:   Presentation: Stable and uncomplicated: LOW COMPLEXITY   CLINICAL DECISION MAKIN[address] [place] [id number] AM-PAC 6 Clicks   Basic Mobility Inpatient Short Form  How much difficulty does the patient currently have. .. Unable A Lot A Little None   1. Turning over in bed (including adjusting bedclothes, sheets and blankets)? [] 1   [] 2   [x] 3   [] 4   2. Sitting down on and standing up from a chair with arms ( e.g., wheelchair, bedside commode, etc.)   [] 1   [] 2   [x] 3   [] 4   3. Moving from lying on back to sitting on the side of the bed? [] 1   [] 2   [x] 3   [] 4   How much help from another person does the patient currently need. .. Total A Lot A Little None   4. Moving to and from a bed to a chair (including a wheelchair)? [] 1   [] 2   [x] 3   [] 4   5. Need to walk in hospital room? [] 1   [] 2   [x] 3   [] 4   6. Climbing 3-5 steps with a railing? [] 1   [] 2   [x] 3   [] 4   © , Trustees of [address] [place] [address] [address] [address], under license to [employer]. All rights reserved      Score:  Initial: 18 Most Recent: X (Date: -- )    Interpretation of Tool:  Represents activities that are increasingly more difficult (i.e. Bed mobility, Transfers, Gait). Medical Necessity:     · Patient is expected to demonstrate progress in   · strength, range of motion, balance, coordination, and functional technique  ·  to   · increase independence with all mobility and promote return to prior level of function. · .  Reason for Services/Other Comments:  · Patient continues to require skilled intervention due to   · medical complications and mobility deficits which impact his level of function, safety, and independence as indicated above.    · .   Use of outcome tool(s) and clinical judgement create a POC that gives a: Clear prediction of patient's progress: LOW COMPLEXITY        TREATMENT:   (In addition to Assessment/Re-Assessment sessions the following treatments were rendered) Pre-treatment Symptoms/Complaints:  none  Pain: Initial:   Pain Intensity 1: 4  Post Session:  5/10 LLE hip pain     Gait Training (  20 Minutes):  Pre-gait activities including sit <> stand transfers, supported standing and weight shifting with BUE support at RW, as well as gait training to improve and/or restore physical functioning as related to mobility, strength, balance and coordination. Ambulated 450 Feet (ft) with Stand-by assistance using a Walker, rolling and minimal  cues related to their stance phase, stride length, push off and heel strike to promote proper body alignment, promote proper body posture, promote proper body mechanics and promote proper body breathing techniques. Therapeutic Exercise: ( 10 minutes):  Exercises per grid below to improve mobility and strength. Required minimal visual and verbal cues to promote proper body alignment, promote proper body posture and promote proper body mechanics. Progressed range and repetitions as indicated. Date:  3/3/20 Date:  3/4/20 Date:     ACTIVITY/EXERCISE AM PM AM PM AM PM   Ambulation:           Distance  Device  Duration         Seated Heel Raises X 20 B X 25 B X 25 B      Seated Toe Raises X 20 B X 25 B X 25 B      Seated Long Arc Quads X 15 B X 20 B  X 20 L      Seated Marching X 10 L in standing X 15 L in standing X 15 L in standing      Seated Hip Abduction X 10 L in standing X 15 L in standing X 15 L in standing               B = bilateral; AA = active assistive; A = active; P = passive        Braces/Orthotics/Lines/Etc:   · IV  · O2 Device: Room Air  Treatment/Session Assessment:    · Response to Treatment:  See above  · Interdisciplinary Collaboration:   o Physical Therapy Assistant  o Registered Nurse  · After treatment position/precautions:   o Up in chair  o Bed/Chair-wheels locked  o Bed in low position  o Call light within reach  o RN notified   · Compliance with Program/Exercises:  Will assess as treatment progresses  · Recommendations/Intent for next treatment session: \"Next visit will focus on advancements to more challenging activities and reduction in assistance provided\".     Total Treatment Duration:  PT Patient Time In/Time Out  Time In: 0920  Time Out: KATH Armenta

## 2020-03-04 NOTE — PROGRESS NOTES
Orthopedic Joint Progress Note    2020  Admit Date: 3/2/2020  Admit Diagnosis: Osteoarthritis of left hip, unspecified osteoarthritis type [M16.12]  Osteoarthritis of left hip [M16.12]    2 Days Post-Op    Subjective:     Enrrique Gomez is OOB in care, eating lunch, pain controlled, still needs assist with activity but progressing well. Review of Systems: Pertinent items are noted in HPI. Objective:     PT/OT:     PATIENT MOBILITY    Bed Mobility  Rolling: Contact guard assistance  Supine to Sit: Stand-by assistance, Contact guard assistance  Sit to Supine: Minimum assistance  Scooting: Modified independent  Transfers  Sit to Stand: Stand-by assistance  Stand to Sit: Stand-by assistance  Bed to Chair: Contact guard assistance      Gait  Base of Support: Center of gravity altered, Shift to right  Speed/Lillie: Pace decreased (<100 feet/min)  Step Length: Left shortened, Right shortened  Stance: Left decreased, Time, Weight shift  Gait Abnormalities: Decreased step clearance  Ambulation - Level of Assistance: Stand-by assistance  Distance (ft): 450 Feet (ft)  Assistive Device: Walker, rolling  Rail Use: Both  Stairs - Level of Assistance: Contact guard assistance  Number of Stairs Trained: 3   Weight Bearing Status  Left Side Weight Bearing: As tolerated        Vital Signs:    Blood pressure 126/78, pulse (!) 54, temperature 98.2 °F (36.8 °C), resp. rate 16, height 6' (1.829 m), weight 110.5 kg (243 lb 9.6 oz), SpO2 97 %.   Temp (24hrs), Av.9 °F (36.6 °C), Min:96.8 °F (36 °C), Max:98.5 °F (36.9 °C)      Pain Control:   Pain Assessment  Pain Scale 1: Numeric (0 - 10)  Pain Intensity 1: 6  Pain Onset 1: postop  Pain Location 1: Hip  Pain Orientation 1: Left  Pain Description 1: Aching  Pain Intervention(s) 1: Medication (see MAR)    Meds:  Current Facility-Administered Medications   Medication Dose Route Frequency    nebivolol (BYSTOLIC) tablet 5 mg  5 mg Oral DAILY    rivaroxaban (XARELTO) tablet 20 mg  20 mg Oral DAILY WITH BREAKFAST    vortioxetine (TRINTELLIX) tablet 10 mg (Patient Supplied)  10 mg Oral DAILY    alcohol 62% (NOZIN) nasal  1 Ampule  1 Ampule Topical Q12H    sodium chloride (NS) flush 5-40 mL  5-40 mL IntraVENous Q8H    sodium chloride (NS) flush 5-40 mL  5-40 mL IntraVENous PRN    celecoxib (CELEBREX) capsule 200 mg  200 mg Oral Q12H    naloxone (NARCAN) injection 0.2-0.4 mg  0.2-0.4 mg IntraVENous Q10MIN PRN    diphenhydrAMINE (BENADRYL) capsule 25 mg  25 mg Oral Q4H PRN    senna-docusate (PERICOLACE) 8.6-50 mg per tablet 2 Tab  2 Tab Oral DAILY    oxyCODONE-acetaminophen (PERCOCET 10)  mg per tablet 1 Tab  1 Tab Oral Q4H PRN    HYDROmorphone (PF) (DILAUDID) injection 1 mg  1 mg IntraVENous Q3H PRN        LAB:    Lab Results   Component Value Date/Time    INR 2.8 (H) 08/30/2017 03:28 AM    INR 2.9 (H) 08/28/2017 08:07 AM    INR 2.5 (H) 08/27/2017 08:10 AM    INR (POC) 1.0 03/06/2018 06:10 AM     Lab Results   Component Value Date/Time    HGB 12.5 (L) 03/03/2020 04:29 AM    HGB 15.7 02/26/2020 02:34 PM    HGB 13.9 08/25/2017 09:21 AM       Wound Hip Left (Active)   Dressing Status Clean, dry, and intact 3/4/2020  7:30 AM   Dressing Type Aquacel 3/4/2020  7:30 AM   Number of days: 2       [REMOVED] Wound Back Posterior (Removed)   Number of days:        [REMOVED] Wound Shoulder Left (Removed)   Number of days: 728         Physical Exam:  Musculoskeletal: negative  positive for - joint pain left hip, s/p left EJ, dressing intact, old dried bloody drainage noted. Assessment:      Principal Problem:    Osteoarthritis of left hip (3/1/2020)         Plan:     Continue PT/OT/Rehab  Consult: NA    Patient Expects to be Discharged to[de-identified] Private residence tomorrow as he resides alone and is still needing some assistance, will have help tomorrow.       Signed By: Ale Pate NP

## 2020-03-04 NOTE — PROGRESS NOTES
Problem: Self Care Deficits Care Plan (Adult)  Goal: *Acute Goals and Plan of Care (Insert Text)  Description  1. Patient will verbalize and demonstrate understanding of hip precautions with 100% accuracy during ADL. 2. Patient will complete functional transfers with modified independence and adaptive equipment as needed. 3. Patient will complete lower body bathing and dressing with modified inepdendence and adaptive equipment as needed. 4. Patient will complete toileting with modified independence. 5. Patient will tolerate at least 20 minutes of BUE therapeutic exercises to increase strength in BUE to aid in functional transfers. 6. Patient will tolerate at least 20 minutes of OT treatment with no rest breaks to increase activity tolerance for ADLs. Timeframe: 7 visits      Outcome: Progressing Towards Goal       OCCUPATIONAL THERAPY: Daily Note 3/4/2020  INPATIENT: OT Visit Days: 2  Payor: BLUE CROSS / [place]SSENTIALS [place] / Product Type: Marco Peters /      NAME/AGE/GENDER: Zahira Ferguson is a 60 y.o. male   PRIMARY DIAGNOSIS:  Osteoarthritis of left hip, unspecified osteoarthritis type [M16.12]  Osteoarthritis of left hip [M16.12] Osteoarthritis of left hip Osteoarthritis of left hip  Procedure(s) (LRB):  LEFT TOTAL HIP ARTHROPLASTY (Left)  2 Days Post-Op  ICD-10: Treatment Diagnosis:    · Pain in left hip (M25.552)  · Stiffness of Left Hip, Not elsewhere classified (M25.652)   Precautions/Allergies:    Full WB LLE   Hip Precautions    Adhesive      ASSESSMENT:     Mr. Joya Garcia is a 60year old male who is s/p L EJ and full WB in LLE. At baseline he lives alone, but is planning to move in with his girlfriend after discharge. He is typically independent with all ADLs, IADls, and driving. He works a job that requires a lot of walking. Patient supine in bed upon arrival, agreeable to OT evaluation. Reports pain 7/10. BUE assessment reveals ROM, strength are WNL.  Balance is intact in sitting, impaired in standing. Patient is currently functioning below his independent baseline due to EJ. He would benefit from continued occupational therapy to increase independence and safety. Will follow. 3/4/2020: Patient agreeable to OT treatment. Reports pain 8/10. RN present to give pain meds, having difficulty with IV access. Able to complete bed mobility with SBA, sit to stand with minimal assistance, and take steps from bed to chair with CGA and RW. Reviewed hip precautions. Able to verbalize 2/3 precautions without assistance, 3/3 with assistance and verbal cues. Session cut short due to need for new IV access from nursing. Great progress. Continue OT efforts. This section established at most recent assessment   PROBLEM LIST (Impairments causing functional limitations):  1. Decreased ADL/Functional Activities  2. Decreased Transfer Abilities  3. Decreased Ambulation Ability/Technique  4. Decreased Balance  5. Increased Pain  6. Decreased Activity Tolerance  7. Decreased Flexibility/Joint Mobility  8. Decreased Knowledge of Precautions   INTERVENTIONS PLANNED: (Benefits and precautions of occupational therapy have been discussed with the patient.)  1. Activities of daily living training  2. Adaptive equipment training  3. Neuromuscular re-eduation  4. Therapeutic activity  5. Therapeutic exercise     TREATMENT PLAN: Frequency/Duration: Follow patient 4x/ week to address above goals. Rehabilitation Potential For Stated Goals: Good     REHAB RECOMMENDATIONS (at time of discharge pending progress):    Placement: It is my opinion, based on this patient's performance to date, that Mr. Mckeon may benefit from participating in 1-2 additional therapy sessions in order to continue to assess for rehab potential and then make recommendation for disposition at discharge.   Equipment:    None at this time              OCCUPATIONAL PROFILE AND HISTORY:   History of Present Injury/Illness (Reason for Referral):  EJ Past Medical History/Comorbidities:   Mr. Sunita De La Vega  has a past medical history of Arthritis, Cellulitis and abscess of trunk (December, 2009), Chronic pain, Depression, Ex-smoker for more than 1 year, Hypertension, Insomnia, Left shoulder pain, PE (pulmonary thromboembolism) ([facility] [address] (2007), Renal failure (12/10/2009), S/P lobectomy of lung, and Thromboembolus ([facility] [address] (2009, 2011). Mr. Sunita De La Vega  has a past surgical history that includes hx hip replacement (2007); hx thoracotomy (December, 14, 2009); hx lobectomy (12/01/2009); hx gi (April, 2008); hx orthopaedic (Right, 2007); hx orthopaedic (Left, 03/2018); pr chest surgery procedure unlisted (2009); pr chest surgery procedure unlisted; vascular surgery procedure unlist (2009); and hx lumbar fusion (08/2017). Social History/Living Environment:   Home Environment: Private residence  # Steps to Enter: 4  Rails to Enter: No  One/Two Story Residence: One story  Living Alone: No(will be going to stay with his girlfriend following surgery)  Support Systems: Spouse/Significant Other/Partner, Child(bee), Family member(s)  Patient Expects to be Discharged to[de-identified] Private residence  Current DME Used/Available at Home: Walker, rolling, Raised toilet seat, Adaptive dressing aides, Shower chair  Tub or Shower Type: Shower  Prior Level of Function/Work/Activity:  At baseline he lives alone, but is planning to move in with his girlfriend after discharge. He is typically independent with all ADLs, IADls, and driving. He works a job that requires a lot of walking. Number of Personal Factors/Comorbidities that affect the Plan of Care: Brief history (0):  LOW COMPLEXITY   ASSESSMENT OF OCCUPATIONAL PERFORMANCE[de-identified]   Activities of Daily Living:   Basic ADLs (From Assessment) Complex ADLs (From Assessment)   Feeding: Setup  Oral Facial Hygiene/Grooming: Setup  Bathing: Moderate assistance  Upper Body Dressing: Setup  Lower Body Dressing:  Moderate assistance  Toileting: Minimum assistance Instrumental ADL  Meal Preparation: Moderate assistance  Homemaking: Moderate assistance  Medication Management: Independent  Financial Management: Independent   Grooming/Bathing/Dressing Activities of Daily Living                             Bed/Mat Mobility  Supine to Sit: Stand-by assistance  Sit to Stand: Minimum assistance  Stand to Sit: Contact guard assistance  Bed to Chair: Contact guard assistance  Scooting: Stand-by assistance     Most Recent Physical Functioning:   Gross Assessment:  AROM: Within functional limits  Strength: Within functional limits               Posture:     Balance:  Sitting: Intact  Standing: Impaired  Standing - Static: Fair  Standing - Dynamic : Fair Bed Mobility:  Supine to Sit: Stand-by assistance  Scooting: Stand-by assistance  Wheelchair Mobility:     Transfers:  Sit to Stand: Minimum assistance  Stand to Sit: Contact guard assistance  Bed to Chair: Contact guard assistance            Patient Vitals for the past 6 hrs:   BP BP Patient Position SpO2 Pulse   20 1145 126/78 Sitting 97 % (!) 54   20 1452 136/89 At rest 92 % 60       Mental Status  Neurologic State: Alert  Orientation Level: Oriented X4  Cognition: Follows commands  Perception: Appears intact  Perseveration: No perseveration noted  Safety/Judgement: Awareness of environment, Fall prevention, Insight into deficits                          Physical Skills Involved:  1. Range of Motion  2. Balance  3. Strength  4. Activity Tolerance  5. Pain (acute) Cognitive Skills Affected (resulting in the inability to perform in a timely and safe manner):  1. NONE   Psychosocial Skills Affected:  1. Habits/Routines  2. Environmental Adaptation   Number of elements that affect the Plan of Care: 5+:  HIGH COMPLEXITY   CLINICAL DECISION MAKIN [facility] [address] [id number] AM-PAC 6 Clicks   Daily Activity Inpatient Short Form  How much help from another person does the patient currently need. .. Total A Lot A Little None   1. Putting on and taking off regular lower body clothing? [] 1   [x] 2   [] 3   [] 4   2. Bathing (including washing, rinsing, drying)? [] 1   [x] 2   [] 3   [] 4   3. Toileting, which includes using toilet, bedpan or urinal?   [] 1   [] 2   [x] 3   [] 4   4. Putting on and taking off regular upper body clothing? [] 1   [] 2   [] 3   [x] 4   5. Taking care of personal grooming such as brushing teeth? [] 1   [] 2   [] 3   [x] 4   6. Eating meals? [] 1   [] 2   [] 3   [x] 4   © 2007, Trustees of [address] [place] [address] [address] [id number], under license to [employer]. All rights reserved      Score:  Initial: 19 Most Recent: X (Date: -- )    Interpretation of Tool:  Represents activities that are increasingly more difficult (i.e. Bed mobility, Transfers, Gait). Medical Necessity:     · Patient demonstrates   · good  ·  rehab potential due to higher previous functional level. Reason for Services/Other Comments:  · Patient   · continues to require present interventions due to patient's inability to care for self while maintaining hip precautions. · .   Use of outcome tool(s) and clinical judgement create a POC that gives a: MODERATE COMPLEXITY         TREATMENT:   (In addition to Assessment/Re-Assessment sessions the following treatments were rendered)     Pre-treatment Symptoms/Complaints:    Pain: Initial:   Pain Intensity 1: 8  Pain Intervention(s) 1: Nurse notified  Post Session: RN present to get IV access for pain meds. Therapeutic Activity: (    20 minutes): Therapeutic activities including Bed transfers, Chair transfers and Ambulation on level ground to improve mobility and balance. Required minimal   to recall hip precautions. .         Braces/Orthotics/Lines/Etc:   · IV  · O2 Device: Room air  Treatment/Session Assessment:    · Response to Treatment:  tolerated well   · Interdisciplinary Collaboration:   o Occupational Therapist  o Registered Nurse  o   · After treatment position/precautions:   o Up in chair  o Bed/Chair-wheels locked  o Bed in low position  o Call light within reach  o RN notified   · Compliance with Program/Exercises: Compliant all of the time. · Recommendations/Intent for next treatment session: \"Next visit will focus on advancements to more challenging activities and reduction in assistance provided\".   Total Treatment Duration:  OT Patient Time In/Time Out  Time In: 1456  Time Out: [address] Collette Fillers, OTR/L

## 2020-03-05 VITALS
RESPIRATION RATE: 16 BRPM | WEIGHT: 243.6 LBS | OXYGEN SATURATION: 95 % | TEMPERATURE: 98.2 F | HEART RATE: 56 BPM | DIASTOLIC BLOOD PRESSURE: 78 MMHG | SYSTOLIC BLOOD PRESSURE: 121 MMHG | BODY MASS INDEX: 33 KG/M2 | HEIGHT: 72 IN

## 2020-03-05 PROCEDURE — 97110 THERAPEUTIC EXERCISES: CPT

## 2020-03-05 PROCEDURE — 97530 THERAPEUTIC ACTIVITIES: CPT

## 2020-03-05 PROCEDURE — 74011250637 HC RX REV CODE- 250/637: Performed by: ORTHOPAEDIC SURGERY

## 2020-03-05 RX ADMIN — NEBIVOLOL HYDROCHLORIDE 5 MG: 5 TABLET ORAL at 08:37

## 2020-03-05 RX ADMIN — Medication 1 AMPULE: at 08:37

## 2020-03-05 RX ADMIN — RIVAROXABAN 20 MG: 20 TABLET, FILM COATED ORAL at 08:37

## 2020-03-05 RX ADMIN — SENNOSIDES AND DOCUSATE SODIUM 2 TABLET: 8.6; 5 TABLET ORAL at 08:37

## 2020-03-05 RX ADMIN — OXYCODONE HYDROCHLORIDE AND ACETAMINOPHEN 1 TABLET: 10; 325 TABLET ORAL at 08:37

## 2020-03-05 RX ADMIN — OXYCODONE HYDROCHLORIDE AND ACETAMINOPHEN 1 TABLET: 10; 325 TABLET ORAL at 12:59

## 2020-03-05 RX ADMIN — CELECOXIB 200 MG: 200 CAPSULE ORAL at 08:37

## 2020-03-05 NOTE — PROGRESS NOTES
Problem: Falls - Risk of  Goal: *Absence of Falls  Description  Document Luba Feliz Fall Risk and appropriate interventions in the flowsheet.   Outcome: Progressing Towards Goal  Note: Fall Risk Interventions:  Mobility Interventions: Bed/chair exit alarm, Communicate number of staff needed for ambulation/transfer, Patient to call before getting OOB         Medication Interventions: Bed/chair exit alarm, Patient to call before getting OOB, Teach patient to arise slowly    Elimination Interventions: Bed/chair exit alarm, Call light in reach, Patient to call for help with toileting needs, Toilet paper/wipes in reach, Toileting schedule/hourly rounds, Urinal in reach              Problem: Patient Education: Go to Patient Education Activity  Goal: Patient/Family Education  Outcome: Progressing Towards Goal     Problem: Patient Education: Go to Patient Education Activity  Goal: Patient/Family Education  Outcome: Progressing Towards Goal     Problem: Pain  Goal: *Control of Pain  Outcome: Progressing Towards Goal     Problem: Patient Education: Go to Patient Education Activity  Goal: Patient/Family Education  Outcome: Progressing Towards Goal

## 2020-03-05 NOTE — PROGRESS NOTES
Problem: Mobility Impaired (Adult and Pediatric)  Goal: *Acute Goals and Plan of Care  Description  Acute PT Goals:  (1.) Mr. Lee Ann Montenegro will move from supine to sit and sit to supine , scoot up and down and roll side to side with MODIFIED INDEPENDENCE within 7 treatment day(s). (2.) Mr. Lee Ann Montenegro will transfer from bed to chair and chair to bed WBAT LLE with MODIFIED INDEPENDENCE using the least restrictive device within 7 treatment day(s). (3.) Mr. Lee Ann Montenegro will ambulate with MODIFIED INDEPENDENCE for 500 feet with the least restrictive device within 7 treatment day(s). (4.) Mr. Lee Ann Montenegro will perform standing static and dynamic balance activities x 25 minutes WBAT LLE  with MODIFIED INDEPENDENCE to improve safety within 7 treatment day(s). (5.) Mr. Lee Ann Montenegro will ascend and descend 4 stairs using no hand rail(s) with MODIFIED INDEPENDENCE to improve functional mobility and safety within 7 treatment day(s). (6.) Mr. Lee Ann Montenegro will perform bilateral lower extremity exercises x 15 min for HEP with INDEPENDENCE to improve strength, endurance, and functional mobility within 7 treatment day(s). PHYSICAL THERAPY: Daily Note and AM 3/5/2020  INPATIENT: PT Visit Days : 4  Payor: Cynthia Rodriguez / [place]UE [place]SSENTIALS [place] / Product Type: Robinson Albarado /       NAME/AGE/GENDER: Scarlett Dumas is a 60 y.o. male   PRIMARY DIAGNOSIS: Osteoarthritis of left hip, unspecified osteoarthritis type [M16.12]  Osteoarthritis of left hip [M16.12] Osteoarthritis of left hip Osteoarthritis of left hip  Procedure(s) (LRB):  LEFT TOTAL HIP ARTHROPLASTY (Left)  3 Days Post-Op  ICD-10: Treatment Diagnosis:   · Difficulty in walking, Not elsewhere classified (R26.2)  · Other abnormalities of gait and mobility (R26.89)   Precaution/Allergies:  Adhesive      ASSESSMENT:                                                                                        WBAT LLE    Mr. Lee Ann Montenegro is a 60year old M who presents s/p LLE EJ.  Prior to hospital admission pt lives alone, but will be moving in with his girlfriend follow surgery, in a one story home with four step(s) to enter. Pt endorses no falls in past 6 months. Prior to admission Mr. Josefina Silva uses no DME for mobility, he does have a rolling walker availability for mobility following surgery. Pt presents supine in bed, agreeable to therapy. Pt performed bed mobility with supervision. Pt stood and ambulated about 900' using rolling walker and supervision. Pt returned to room and performed below LE exercises and returned supine with CGA for L LE. Pt able to position himself in bed to comfort.  good progress with ambulation. Improved step length and more normalizing gait pattern. Pt is scheduled to go home later today. This section established at most recent assessment   PROBLEM LIST (Impairments causing functional limitations):  1. Decreased Strength  2. Decreased ADL/Functional Activities  3. Decreased Transfer Abilities  4. Decreased Ambulation Ability/Technique  5. Decreased Balance  6. Increased Pain  7. Decreased Flexibility/Joint Mobility   INTERVENTIONS PLANNED: (Benefits and precautions of physical therapy have been discussed with the patient.)  1. Balance Exercise  2. Bed Mobility  3. Family Education  4. Gait Training  5. Home Exercise Program (HEP)  6. Neuromuscular Re-education/Strengthening  7. Range of Motion (ROM)  8. Therapeutic Activites  9. Therapeutic Exercise/Strengthening  10. Transfer Training     TREATMENT PLAN: Frequency/Duration: 3 times a week for duration of hospital stay  Rehabilitation Potential For Stated Goals: Good     REHAB RECOMMENDATIONS (at time of discharge pending progress):    Placement: It is my opinion, based on this patient's performance to date, that Mr. Mckeon may benefit from OUTPATIENT THERAPY after discharge due to the functional deficits listed above that are likely to improve with skilled rehabilitation because because he/she is able to safely attend regular and reoccurring therapy sessions at an outpatient facility and because he/she will benefit from the individualized approach tailored to his/her deficits. Equipment:    None at this time              HISTORY:   History of Present Injury/Illness (Reason for Referral):  Per H&P: \"The patient is a 53-year-old gentleman. He is status post right hip arthroplasty in the past, did quite well with this. He developed similar symptoms to his left hip and x-ray examination was consistent with degenerative arthritis of the left hip. We discussed different treatment options. He has opted for total hip arthroplasty. We discussed the dual-mobility Smith and Nephew type. He understands the risks and complications inherent and wished to proceed. He also understands the risks inherent with any arthroplasty to include but not limited to infection, dislocation, wearing out which may necessitate surgery, leg length inequality, blood clots, blood loss, and other less common but potentially serious complication that may occur. Informed consent was obtained. \" Pt now s/p LLE EJ, WBAT LLE  Past Medical History/Comorbidities:   Mr. Shaan Tejada  has a past medical history of Arthritis, Cellulitis and abscess of trunk (December, 2009), Chronic pain, Depression, Ex-smoker for more than 1 year, Hypertension, Insomnia, Left shoulder pain, PE (pulmonary thromboembolism) ([address] (2007), Renal failure (12/10/2009), S/P lobectomy of lung, and Thromboembolus ([facility] [address] (2009, 2011). Mr. Shaan Tejada  has a past surgical history that includes hx hip replacement (2007); hx thoracotomy (December, 14, 2009); hx lobectomy (12/01/2009); hx gi (April, 2008); hx orthopaedic (Right, 2007); hx orthopaedic (Left, 03/2018); pr chest surgery procedure unlisted (2009); pr chest surgery procedure unlisted; vascular surgery procedure unlist (2009); and hx lumbar fusion (08/2017).   Social History/Living Environment:   Home Environment: Private residence  # Steps to Enter: 4  Rails to Enter: No  One/Two Story Residence: One story  Living Alone: No(will be going to stay with his girlfriend following surgery)  Support Systems: Spouse/Significant Other/Partner, Child(bee), Family member(s)  Patient Expects to be Discharged to[de-identified] Private residence  Current DME Used/Available at Home: Walker, rolling, Raised toilet seat, Adaptive dressing aides, Shower chair  Tub or Shower Type: Shower  Prior Level of Function/Work/Activity:  Independent, active. Number of Personal Factors/Comorbidities that affect the Plan of Care: 1-2: MODERATE COMPLEXITY   EXAMINATION:   Most Recent Physical Functioning:   Gross Assessment:                  Posture:     Balance:  Sitting: Intact  Standing - Static: Good  Standing - Dynamic : Fair Bed Mobility:  Supine to Sit: Supervision  Sit to Supine: Contact guard assistance  Wheelchair Mobility:     Transfers:  Sit to Stand: Stand-by assistance  Stand to Sit: Stand-by assistance  Bed to Chair: Stand-by assistance  Gait:  Left Side Weight Bearing: As tolerated  Speed/Lillie: Pace decreased (<100 feet/min)  Gait Abnormalities: Decreased step clearance  Distance (ft): 900 Feet (ft)  Assistive Device: Walker, rolling  Ambulation - Level of Assistance: Supervision      Body Structures Involved:  1. Nerves  2. Bones  3. Joints  4. Muscles Body Functions Affected:  1. Neuromusculoskeletal  2. Movement Related Activities and Participation Affected:  1. General Tasks and Demands  2. Mobility   Number of elements that affect the Plan of Care: 3: MODERATE COMPLEXITY   CLINICAL PRESENTATION:   Presentation: Stable and uncomplicated: LOW COMPLEXITY   CLINICAL DECISION MAKING:   M MIRAGE AM-PAC 6 Clicks   Basic Mobility Inpatient Short Form  How much difficulty does the patient currently have. .. Unable A Lot A Little None   1. Turning over in bed (including adjusting bedclothes, sheets and blankets)? [] 1   [] 2   [x] 3   [] 4   2.   Sitting down on and standing up from a chair with arms ( e.g., wheelchair, bedside commode, etc.)   [] 1   [] 2   [x] 3   [] 4   3. Moving from lying on back to sitting on the side of the bed? [] 1   [] 2   [x] 3   [] 4   How much help from another person does the patient currently need. .. Total A Lot A Little None   4. Moving to and from a bed to a chair (including a wheelchair)? [] 1   [] 2   [x] 3   [] 4   5. Need to walk in hospital room? [] 1   [] 2   [x] 3   [] 4   6. Climbing 3-5 steps with a railing? [] 1   [] 2   [x] 3   [] 4   © 2007, Trustees of [address] [place] [address] [address] [address], under license to [employer]. All rights reserved      Score:  Initial: 18 Most Recent: X (Date: -- )    Interpretation of Tool:  Represents activities that are increasingly more difficult (i.e. Bed mobility, Transfers, Gait). Medical Necessity:     · Patient is expected to demonstrate progress in   · strength, range of motion, balance, coordination, and functional technique  ·  to   · increase independence with all mobility and promote return to prior level of function. · .  Reason for Services/Other Comments:  · Patient continues to require skilled intervention due to   · medical complications and mobility deficits which impact his level of function, safety, and independence as indicated above. · .   Use of outcome tool(s) and clinical judgement create a POC that gives a: Clear prediction of patient's progress: LOW COMPLEXITY        TREATMENT:   (In addition to Assessment/Re-Assessment sessions the following treatments were rendered)   Pre-treatment Symptoms/Complaints:  none  Pain: Initial:   Pain Intensity 1: 4  Post Session:  5/10 LLE hip pain     Therapeutic Activity: (    15 minutes): Therapeutic activities including Bed transfers, toilet transfers, Chair transfers and Ambulation on level ground to improve mobility and strength. Required minimal   to promote dynamic balance in standing.      Therapeutic Exercise: ( 10 minutes):  Exercises per grid below to improve mobility and strength. Required minimal visual and verbal cues to promote proper body alignment, promote proper body posture and promote proper body mechanics. Progressed range and repetitions as indicated. Date:  3/3/20 Date:  3/4/20 Date:  3/5/20   ACTIVITY/EXERCISE AM PM AM PM AM PM   Ambulation:           Distance  Device  Duration         Seated Heel Raises X 20 B X 25 B X 25 B  X 25 B    Seated Toe Raises X 20 B X 25 B X 25 B  X 25 B     Seated Long Arc Quads X 15 B X 20 B  X 20 L  X 25 L    Seated Marching X 10 L in standing X 15 L in standing X 15 L in standing  X 15 L in standing    Seated Hip Abduction X 10 L in standing X 15 L in standing X 15 L in standing  X 15 L in standing             B = bilateral; AA = active assistive; A = active; P = passive        Braces/Orthotics/Lines/Etc:   · IV  · O2 Device: Room Air  Treatment/Session Assessment:    · Response to Treatment:  See above  · Interdisciplinary Collaboration:   o Physical Therapy Assistant  o Registered Nurse  · After treatment position/precautions:   o Supine in bed  o Bed/Chair-wheels locked  o Bed in low position  o Call light within reach  o RN notified   · Compliance with Program/Exercises: Will assess as treatment progresses  · Recommendations/Intent for next treatment session: \"Next visit will focus on advancements to more challenging activities and reduction in assistance provided\".     Total Treatment Duration:  PT Patient Time In/Time Out  Time In: 0930  Time Out: [address] [facility]

## 2020-03-05 NOTE — PROGRESS NOTES
Patient discharged, instructions reviewed with patient. Questions answered, IV removed. Patient to call when ride has arrived.

## 2020-03-05 NOTE — DISCHARGE SUMMARY
Dr. Adeel Ingram  Discharge Summary      Patient ID:  Noreen Anaya  [id number]  89 y.o.  1955    Admit date: 3/2/2020    Discharge date and time: 3/5/2020     Admitting Physician: Josee Colin MD     Discharge Physician: Floridalma Castro NP    Admission Diagnoses: Osteoarthritis of left hip, unspecified osteoarthritis type [M16.12]  Osteoarthritis of left hip [M16.12]    Discharge Diagnoses:   Problem List as of 3/5/2020 Date Reviewed: 3/1/2020          Codes Class Noted - Resolved    * (Principal) Osteoarthritis of left hip ICD-10-CM: M16.12  ICD-9-CM: 715.95  3/1/2020 - Present        Personal history of DVT (deep vein thrombosis) ICD-10-CM: Z86.718  ICD-9-CM: V12.51  8/21/2017 - Present        Spinal stenosis of lumbar region ICD-10-CM: M48.061  ICD-9-CM: 724.02  8/8/2017 - Present        Spondylolisthesis of lumbar region ICD-10-CM: M43.16  ICD-9-CM: 738.4  8/8/2017 - Present        Spinal stenosis ICD-10-CM: M48.00  ICD-9-CM: 724.00  8/8/2017 - Present        Gastro - esophageal reflux disease (Chronic) ICD-9-CM: 530.81  12/19/2009 - Present        Cellulitis and abscess of trunk ICD-10-CM: L03.319, L02.219  ICD-9-CM: 682.2  12/19/2009 - Present    Overview Signed 12/19/2009 10:48 AM by Caden Wong. Secondary to infected right thoracotomy incision.              Hydropneumothorax ICD-10-CM: J94.8  ICD-9-CM: 511.89  12/19/2009 - Present        Anemia ICD-10-CM: D64.9  ICD-9-CM: 285.9  12/10/2009 - Present        Mycetoma (Chronic) ICD-10-CM: B47.9  ICD-9-CM: 039.9  12/1/2009 - Present        Status Post Partial Lobectomy of Lung-mycetoma ICD-10-CM: Z90.2  ICD-9-CM: V45.89  12/1/2009 - Present        Bronchiectasis (HCC) (Chronic) ICD-10-CM: J47.9  ICD-9-CM: 494.0  12/1/2009 - Present        RESOLVED: Fever ICD-10-CM: R50.9  ICD-9-CM: 780.60  12/10/2009 - 12/11/2009        RESOLVED: Renal failure ICD-10-CM: N19  ICD-9-CM: 774  12/10/2009 - 12/14/2009        RESOLVED: Hyponatremia ICD-10-CM: E87.1  ICD-9-CM: 276.1  12/10/2009 - 12/14/2009        RESOLVED: Hypotension ICD-10-CM: I95.9  ICD-9-CM: 458.9  12/10/2009 - 12/11/2009        RESOLVED: Systemic inflammatory response syndrome due to non-infectious process with acute organ dysfunction ([facility] [address] ICD-10-CM: R65.11  ICD-9-CM: 995.94  12/10/2009 - 12/14/2009        RESOLVED: Hypoxemia ICD-10-CM: R09.02  ICD-9-CM: 799.02  12/1/2009 - 12/20/2009              Surgeon: Joni Tucker NP    Preoperative Medical Clearance: stopped Nadya Aburto prior to surgery clearance                          Perioperative Antibiotics: Ancef  _x__                                                Vancomycin  ___      Postoperative Pain Management:  Pain controlled with PO meds, prescription for Oxycodone 5 mg tablet take 1-2 by mouth as needed for pain sent to [facility] in [facility]     DVT Prophylaxis:  Resume Xeralto at home                                  Lovenox                                  SRINATH Hose                                  Plexi-Pulse    Postoperative transfusions:     none Banked PRBCs     Post Op complications: none    Hemoglobin at discharge:   Lab Results   Component Value Date/Time    HGB 12.5 (L) 03/03/2020 04:29 AM       Wound appears to be healing without any evidence of infection. Physical Therapy started on the day following surgery and progressed to independent ambulation with the aid of a walker. At the time of discharge, able to go up and down stairs and had understanding of precautions needed following surgery. PT at time of DC: Functional ROM no pain, dressing intact. Weight bearing OOB with walker and no pain, progressing well. Current Discharge Medication List      CONTINUE these medications which have NOT CHANGED    Details   vortioxetine (TRINTELLIX) 10 mg tablet Take 10 mg by mouth daily. nebivolol (BYSTOLIC) 5 mg tablet Take 5 mg by mouth every morning.  Indications: HYPERTENSION      testosterone cypionate (DEPO-TESTOSTERONE) 200 mg/mL injection by IntraMUSCular route every thirty (30) days. rivaroxaban (XARELTO) 10 mg tablet Take 10 mg by mouth daily (with breakfast). STOP taking these medications       HYDROcodone-acetaminophen (NORCO) 5-325 mg per tablet Comments:   Reason for Stopping:               Discharged to: Home    Discharge instructions:  - Anticoagulate with: Marielena Genta as prior to surgery   -Resume pre hospital diet             -Resume home medications per medical continuation form     -Ambulate with walker, appropriate total joint protocol  -Follow up in office as scheduled 10 days, already has appointment  Outpatient PT at [facility] in [place], start Monday. Order already sent.        Signed:  Adina Poole NP  3/5/2020  1:11 PM

## 2020-03-05 NOTE — DISCHARGE INSTRUCTIONS
Patient Education        Learning About Total Hip Replacement Surgery  What is total hip replacement surgery? During total hip replacement surgery, your doctor replaces the worn parts of your hip joint with artificial parts made of metal, ceramic, or plastic. You may want this surgery if you have hip pain and trouble moving that you can't treat in other ways. Osteoarthritis or rheumatoid arthritis can cause these types of problems. Another cause is bone loss due to a poor blood supply. Hip replacement is sometimes done after a hip fracture. How is this surgery done? In traditional hip replacement surgery, your doctor makes a 6- to 10-inch cut (incision) on the side or the back of your hip. Some muscles and other soft tissues, such as ligaments, are cut so the doctor can get to the hip joint. Hip replacement can also be done with one or two smaller cuts. This is called minimally invasive surgery. It may cause less blood loss and a smaller scar. But it can also mean a longer time in surgery, because the surgery is harder to do. And if the new hip can't be fitted properly through the smaller cut, the doctor may have to make a larger opening. A newer type of surgery is done through a small incision in the front (anterior) of the hip. Anterior surgery causes less damage to muscles and other soft tissues than getting to the hip joint from the side or the back. It may help you heal faster and get back to activity sooner. Anterior surgery and minimally invasive surgery require special training and equipment. Your doctor can explain your options and help you understand the risks and benefits of each type of surgery. You will get medicine to make you sleep during the surgery. After making the incision, your doctor will:  · Remove the worn bone tissue and cartilage from the hip joint. · Replace the ball at the upper end of your thighbone (femur). · Replace your hip socket with a shell and liner.   · Fit the ball into the shell and liner to make a new hip joint. Surgery may take 1 to 3 hours. There are two kinds of replacement joints. · Cemented joints. The cement fits between the new joint and the bone. · Uncemented joints. These have a metal coating with many small openings. The bone is shaped to fit the new joint almost perfectly. But there are some small spaces. Over time, the bone grows to fill these small openings. Sometimes, a doctor will use a cemented ball and an uncemented socket. Your doctor can tell you which type of new hip joint is best for you. What can you expect after a total hip replacement? Your doctor will let you know if you will stay in the hospital or if you can go home the day of surgery. The physical therapist will show you how to move safely before you leave the hospital. Joss Ag will also learn exercises to help you get stronger. You may need physical therapy for several weeks after you leave the hospital. At home you'll keep doing the exercises you learned. You will be able to move around with crutches or a walker. But for a while you will need someone who can help you day and night. If you need more extensive rehab, you may go to a specialized rehab center for more treatment. Your doctor will give you information about what to expect after surgery. How long it takes to recover will depend in part on the type of surgery you have. After traditional surgery, you will slowly return to most of your activities. · You will need to use crutches or a walker for the first few weeks after surgery. · Your doctor will tell you when you can drive again. · You may be able to go back to work in 4 weeks to 4 months. It depends on your job. · Your doctor will tell you when you can do activities like swim, dance, golf, or bicycle. · You may need to avoid some strenuous activities. These may include running, horseback riding, tennis, and any type of skiing.   · For most people it is safe to have sex about 4 to 6 weeks after the surgery. It usually takes 3 to 6 months to get back to full activity after traditional surgery. You may recover faster if you have minimally invasive or anterior surgery. After you recover from surgery, you may have much less pain than before and a better quality of life. Most artificial hip joints last for 10 to 20 years or longer. It depends on your age, how much stress you put on the joint, and how well your new joint and bones mend. Your weight can make a difference. Every extra pound of body weight adds 3 pounds of stress to your new hip joint. Controlling your weight can help your new hip joint last longer. It should also last longer if you avoid hard physical work and sports that stress the joint. Your doctor may want to see you about once a year to see how you and your new hip are doing. Follow-up care is a key part of your treatment and safety. Be sure to make and go to all appointments, and call your doctor if you are having problems. It's also a good idea to know your test results and keep a list of the medicines you take. Where can you learn more? Go to http://magdi-jerilyn.info/. Enter W683 in the search box to learn more about \"Learning About Total Hip Replacement Surgery. \"  Current as of: June 26, 2019  Content Version: 12.2  © 0877-5152 [employer]. Care instructions adapted under license by [employer] (which disclaims liability or warranty for this information). If you have questions about a medical condition or this instruction, always ask your healthcare professional. [place] [address] any warranty or liability for your use of this information.

## 2020-03-05 NOTE — PROGRESS NOTES
Pt was medically cleared for discharge to home today with outpatient services arranged by the surgeon's office. No other dc needs or concerns identified or reported. Care Management Interventions  PCP Verified by CM: Yes(Jose Juan Painter )  Mode of Transport at Discharge:  Other (see comment)  Transition of Care Consult (CM Consult): Discharge Planning  Discharge Durable Medical Equipment: No  Physical Therapy Consult: Yes  Occupational Therapy Consult: Yes  Current Support Network: Own Home, Lives with Spouse(Pt states that he lives alone but after the hospital visit, he will be going to live with his GF)  Discharge Location  Discharge Placement: Home with outpatient services(Outpatient PT at [facility] at [facility] in [place])

## 2020-12-14 ENCOUNTER — HOSPITAL ENCOUNTER (OUTPATIENT)
Dept: LAB | Age: 65
Discharge: HOME | End: 2020-12-14
Payer: COMMERCIAL

## 2020-12-14 DIAGNOSIS — Z86.718 PERSONAL HISTORY OF DVT (DEEP VEIN THROMBOSIS): ICD-10-CM

## 2020-12-14 LAB
ALBUMIN SERPL-MCNC: 3.7 G/DL (ref 3.2–4.6)
ALBUMIN/GLOB SERPL: 1.1 {RATIO} (ref 1.2–3.5)
ALP SERPL-CCNC: 68 U/L (ref 50–136)
ALT SERPL-CCNC: 43 U/L (ref 12–65)
ANION GAP SERPL CALC-SCNC: 4 MMOL/L (ref 7–16)
AST SERPL-CCNC: 30 U/L (ref 15–37)
BASOPHILS # BLD: 0 K/UL (ref 0–0.2)
BASOPHILS NFR BLD: 1 % (ref 0–2)
BILIRUB SERPL-MCNC: 1.5 MG/DL (ref 0.2–1.1)
BUN SERPL-MCNC: 9 MG/DL (ref 8–23)
CALCIUM SERPL-MCNC: 8.9 MG/DL (ref 8.3–10.4)
CHLORIDE SERPL-SCNC: 105 MMOL/L (ref 98–107)
CO2 SERPL-SCNC: 29 MMOL/L (ref 21–32)
CREAT SERPL-MCNC: 0.9 MG/DL (ref 0.8–1.5)
DIFFERENTIAL METHOD BLD: ABNORMAL
EOSINOPHIL # BLD: 0.2 K/UL (ref 0–0.8)
EOSINOPHIL NFR BLD: 4 % (ref 0.5–7.8)
ERYTHROCYTE [DISTWIDTH] IN BLOOD BY AUTOMATED COUNT: 13.2 % (ref 11.9–14.6)
GLOBULIN SER CALC-MCNC: 3.3 G/DL (ref 2.3–3.5)
GLUCOSE SERPL-MCNC: 105 MG/DL (ref 65–100)
HCT VFR BLD AUTO: 47.9 % (ref 41.1–50.3)
HGB BLD-MCNC: 16.6 G/DL (ref 13.6–17.2)
IMM GRANULOCYTES # BLD AUTO: 0 K/UL (ref 0–0.5)
IMM GRANULOCYTES NFR BLD AUTO: 0 % (ref 0–5)
LYMPHOCYTES # BLD: 2 K/UL (ref 0.5–4.6)
LYMPHOCYTES NFR BLD: 34 % (ref 13–44)
MCH RBC QN AUTO: 34.1 PG (ref 26.1–32.9)
MCHC RBC AUTO-ENTMCNC: 34.7 G/DL (ref 31.4–35)
MCV RBC AUTO: 98.4 FL (ref 79.6–97.8)
MONOCYTES # BLD: 0.5 K/UL (ref 0.1–1.3)
MONOCYTES NFR BLD: 8 % (ref 4–12)
NEUTS SEG # BLD: 3.1 K/UL (ref 1.7–8.2)
NEUTS SEG NFR BLD: 53 % (ref 43–78)
NRBC # BLD: 0 K/UL (ref 0–0.2)
PLATELET # BLD AUTO: 99 K/UL (ref 150–450)
PMV BLD AUTO: 9.4 FL (ref 9.4–12.3)
POTASSIUM SERPL-SCNC: 5.2 MMOL/L (ref 3.5–5.1)
PROT SERPL-MCNC: 7 G/DL (ref 6.3–8.2)
PSA SERPL-MCNC: 0.4 NG/ML
RBC # BLD AUTO: 4.87 M/UL (ref 4.23–5.67)
SODIUM SERPL-SCNC: 138 MMOL/L (ref 136–145)
WBC # BLD AUTO: 5.9 K/UL (ref 4.3–11.1)

## 2020-12-14 PROCEDURE — 85306 CLOT INHIBIT PROT S FREE: CPT

## 2020-12-14 PROCEDURE — 84153 ASSAY OF PSA TOTAL: CPT

## 2020-12-14 PROCEDURE — 80053 COMPREHEN METABOLIC PANEL: CPT

## 2020-12-14 PROCEDURE — 85025 COMPLETE CBC W/AUTO DIFF WBC: CPT

## 2020-12-14 PROCEDURE — 85302 CLOT INHIBIT PROT C ANTIGEN: CPT

## 2020-12-14 PROCEDURE — 36415 COLL VENOUS BLD VENIPUNCTURE: CPT

## 2020-12-16 LAB — PROT S ACT/NOR PPP: 129 % (ref 63–140)

## 2020-12-17 PROBLEM — L72.3 SEBACEOUS CYST: Status: ACTIVE | Noted: 2020-12-17

## 2020-12-17 LAB — PROT C AG PPP IA-ACNC: 85 % (ref 60–150)

## 2021-02-08 ENCOUNTER — HOSPITAL ENCOUNTER (OUTPATIENT)
Dept: ULTRASOUND IMAGING | Age: 66
Discharge: HOME | End: 2021-02-08
Attending: INTERNAL MEDICINE
Payer: COMMERCIAL

## 2021-02-08 DIAGNOSIS — Z86.718 PERSONAL HISTORY OF DVT (DEEP VEIN THROMBOSIS): ICD-10-CM

## 2021-02-08 PROCEDURE — 93971 EXTREMITY STUDY: CPT

## 2022-03-18 PROBLEM — M48.00 SPINAL STENOSIS: Status: ACTIVE | Noted: 2017-08-08

## 2022-03-18 PROBLEM — M16.12 OSTEOARTHRITIS OF LEFT HIP: Status: ACTIVE | Noted: 2020-03-01

## 2022-03-19 PROBLEM — Z86.718 PERSONAL HISTORY OF DVT (DEEP VEIN THROMBOSIS): Status: ACTIVE | Noted: 2017-08-21

## 2022-03-19 PROBLEM — L72.3 SEBACEOUS CYST: Status: ACTIVE | Noted: 2020-12-17

## 2022-03-19 PROBLEM — M48.061 SPINAL STENOSIS OF LUMBAR REGION: Status: ACTIVE | Noted: 2017-08-08

## 2022-03-19 PROBLEM — M43.16 SPONDYLOLISTHESIS OF LUMBAR REGION: Status: ACTIVE | Noted: 2017-08-08

## 2022-09-27 ENCOUNTER — TELEPHONE (OUTPATIENT)
Dept: ONCOLOGY | Age: 67
End: 2022-09-27

## (undated) DEVICE — REM POLYHESIVE ADULT PATIENT RETURN ELECTRODE: Brand: VALLEYLAB

## (undated) DEVICE — 4-PORT MANIFOLD: Brand: NEPTUNE 2

## (undated) DEVICE — [AGGRESSIVE 6-FLUTE BARREL BUR, ARTHROSCOPIC SHAVER BLADE,  DO NOT RESTERILIZE,  DO NOT USE IF PACKAGE IS DAMAGED,  KEEP DRY,  KEEP AWAY FROM SUNLIGHT]: Brand: FORMULA

## (undated) DEVICE — DRAPE,U/SHT,SPLIT,FILM,60X84,STERILE: Brand: MEDLINE

## (undated) DEVICE — 20 ML SYRINGE LUER-LOCK TIP: Brand: MONOJECT

## (undated) DEVICE — SUTURE VCRL SZ 0 L27IN ABSRB UD L36MM CP-1 1/2 CIR REV CUT J267H

## (undated) DEVICE — 5.0MM PRECISION ROUND

## (undated) DEVICE — KIT EVAC 400CC DIA3/16IN H PAT 12.5IN 3 SPR RND SHP PVC DRN

## (undated) DEVICE — POLYAXIAL CORTICAL SCREW
Type: IMPLANTABLE DEVICE | Site: SPINE LUMBAR | Status: NON-FUNCTIONAL
Brand: XIA 4.5 SYSTEM -  XIA CT
Removed: 2017-08-08

## (undated) DEVICE — SOLUTION IRRIG 3000ML 0.9% SOD CHL FLX CONT 0797208] ICU MEDICAL INC]

## (undated) DEVICE — SUTURE VCRL SZ 1 L27IN ABSRB UD L36MM CP-1 1/2 CIR REV CUT J268H

## (undated) DEVICE — SUTURE VCRL + 3-0 L27IN ABSRB UD PS-2 L19MM 3/8 CIR PRIM VCP427H

## (undated) DEVICE — SLIM BODY SKIN STAPLER: Brand: APPOSE ULC

## (undated) DEVICE — RESTRAINT UNIVERSAL HEAD DISP --

## (undated) DEVICE — STRYKER PERFORMANCE SERIES SAGITTAL BLADE: Brand: STRYKER PERFORMANCE SERIES

## (undated) DEVICE — RETRIEVER SUT ARTHSCP HOFFEE --

## (undated) DEVICE — SURGIFOAM SPNG SZ 100

## (undated) DEVICE — OCCLUSIVE GAUZE STRIP,3% BISMUTH TRIBROMOPHENATE IN PETROLATUM BLEND: Brand: XEROFORM

## (undated) DEVICE — DRILL BIT: Brand: XIA 4.5 SYSTEM -  XIA CT

## (undated) DEVICE — BIT DRL DIA2.9MM DISP FOR SFT SUT ANCHR JUGGERKNOT

## (undated) DEVICE — INTENDED FOR TISSUE SEPARATION, AND OTHER PROCEDURES THAT REQUIRE A SHARP SURGICAL BLADE TO PUNCTURE OR CUT.: Brand: BARD-PARKER SAFETY BLADES SIZE 15, STERILE

## (undated) DEVICE — 2000CC GUARDIAN II: Brand: GUARDIAN

## (undated) DEVICE — PACK PROCEDURE SURG POST LAMINECTOMY CDS

## (undated) DEVICE — INTENDED FOR TISSUE SEPARATION, AND OTHER PROCEDURES THAT REQUIRE A SHARP SURGICAL BLADE TO PUNCTURE OR CUT.: Brand: BARD-PARKER SAFETY BLADES SIZE 10, STERILE

## (undated) DEVICE — WAX SURG 2.5GM HEMSTAT BNE BEESWAX PARAFFIN ISO PALMITATE

## (undated) DEVICE — CONTAINER,SPECIMEN,O.R.STRL,4.5OZ: Brand: MEDLINE

## (undated) DEVICE — BLADE ASSEMB CLP HAIR FINE --

## (undated) DEVICE — 1010 S-DRAPE TOWEL DRAPE 10/BX: Brand: STERI-DRAPE™

## (undated) DEVICE — DRAPE,SHOULDER,BEACH CHAIR,STERILE: Brand: MEDLINE

## (undated) DEVICE — SUTURE FIBERWIRE SZ 2 W/ TAPERED NEEDLE BLUE L38IN NONABSORB BLU L26.5MM 1/2 CIRCLE AR7200

## (undated) DEVICE — 3M™ TEGADERM™ TRANSPARENT FILM DRESSING FRAME STYLE, 1629, 8 IN X 12 IN (20 CM X 30 CM), 10/CT 8CT/CASE: Brand: 3M™ TEGADERM™

## (undated) DEVICE — DRAPE,HIP,W/POUCHES,STERILE: Brand: MEDLINE

## (undated) DEVICE — MASTISOL ADHESIVE LIQ 2/3ML

## (undated) DEVICE — DRSG POSTOP PRMSL AG 3.5X14IN

## (undated) DEVICE — (D)PREP SKN CHLRAPRP APPL 26ML -- CONVERT TO ITEM 371833

## (undated) DEVICE — SOFT SILICONE HYDROCELLULAR FOAM DRESSING WITH LOCK AWAY LAYER: Brand: ALLEVYN LIFE XL 21X21 CTN10

## (undated) DEVICE — 3M™ STERI-STRIP™ REINFORCED ADHESIVE SKIN CLOSURES, R1548, 1 IN X 5 IN (25 MM X 125 MM), 4 STRIPS/ENVELOPE: Brand: 3M™ STERI-STRIP™

## (undated) DEVICE — STOCKINETTE,IMPERVIOUS,12X48,STERILE: Brand: MEDLINE

## (undated) DEVICE — KENDALL SCD EXPRESS SLEEVES, KNEE LENGTH, LARGE: Brand: KENDALL SCD

## (undated) DEVICE — SYR BULB 60ML IRRIGATION -- CONVERT TO ITEM 116413

## (undated) DEVICE — SUTURE VCRL SZ 2-0 L27IN ABSRB UD L26MM CT-2 1/2 CIR J269H

## (undated) DEVICE — DRAPE XR C ARM 41X74IN LF --

## (undated) DEVICE — SURGICAL PROCEDURE PACK BASIC ST FRANCIS

## (undated) DEVICE — 90-S ACCELERATOR, SUCTION PROBE, NON-BENDABLE, MAX CUT LEVEL 11: Brand: SERFAS ENERGY

## (undated) DEVICE — KIT POS W/ FOAM ARM CRADL SHEARGUARD CHST PD CVR FOR SPNL

## (undated) DEVICE — X-RAY SPONGES,12 PLY: Brand: DERMACEA

## (undated) DEVICE — FLOSEAL MATRIX IS INDICATED IN SURGICAL PROCEDURES (OTHER THAN IN OPHTHALMIC) AS AN ADJUNCT TO HEMOSTASIS WHEN CONTROL OF BLEEDING BY LIGATURE OR CONVENTIONALPROCEDURES IS INEFFECTIVE OR IMPRACTICAL.: Brand: FLOSEAL HEMOSTATIC MATRIX

## (undated) DEVICE — SUT ETHLN 3-0 18IN PS1 BLK --

## (undated) DEVICE — SUTURE VCRL SZ 2-0 L27IN ABSRB UD L36MM CP-1 1/2 CIR REV J266H

## (undated) DEVICE — BUTTON SWITCH PENCIL BLADE ELECTRODE, HOLSTER: Brand: EDGE

## (undated) DEVICE — T4 HOOD

## (undated) DEVICE — SOLUTION IV 1000ML 0.9% SOD CHL

## (undated) DEVICE — SET IRRIG W 96IN TBNG 4 LN FLX BG

## (undated) DEVICE — KENDALL SCD EXPRESS SLEEVES, KNEE LENGTH, MEDIUM: Brand: KENDALL SCD

## (undated) DEVICE — SLING ARM L ABV 13IN DE-ROTATION STRP HOOKS PROVIDE IMMOB

## (undated) DEVICE — JAM SHIDI: Brand: XIA PRECISION SYSTEM

## (undated) DEVICE — FAN SPRAY KIT: Brand: PULSAVAC®

## (undated) DEVICE — 3M™ COBAN™ SELF-ADHERENT WRAP, 1586S, STERILE, 6 IN X 5 YD (15 CM X 4,5 M), 12 ROLLS/CASE: Brand: 3M™ COBAN™

## (undated) DEVICE — [RESECTOR CUTTER, ARTHROSCOPIC SHAVER BLADE,  DO NOT RESTERILIZE,  DO NOT USE IF PACKAGE IS DAMAGED,  KEEP DRY,  KEEP AWAY FROM SUNLIGHT]: Brand: FORMULA

## (undated) DEVICE — TOTAL HIP DR LEE: Brand: MEDLINE INDUSTRIES, INC.

## (undated) DEVICE — ABDOMINAL PAD: Brand: DERMACEA

## (undated) DEVICE — (D)SYR 10ML 1/5ML GRAD NSAF -- PKGING CHANGE USE ITEM 338027

## (undated) DEVICE — DRAPE SHT 3 QTR PROXIMA 53X77 --

## (undated) DEVICE — SET IRRIG DST FLX M CONN

## (undated) DEVICE — AMD ANTIMICROBIAL GAUZE SPONGES,12 PLY USP TYPE VII, 0.2% POLYHEXAMETHYLENE BIGUANIDE HCI (PHMB): Brand: CURITY